# Patient Record
Sex: MALE | Race: WHITE | Employment: OTHER | URBAN - METROPOLITAN AREA
[De-identification: names, ages, dates, MRNs, and addresses within clinical notes are randomized per-mention and may not be internally consistent; named-entity substitution may affect disease eponyms.]

---

## 2017-01-27 ENCOUNTER — TELEPHONE (OUTPATIENT)
Dept: INTERNAL MEDICINE CLINIC | Age: 77
End: 2017-01-27

## 2017-01-27 NOTE — TELEPHONE ENCOUNTER
----- Message from Otilia Aguayo sent at 1/27/2017 10:10 AM EST -----  Regarding: Port / Telephone  Carolina Queen [facility] would like a callback to know the status of a fax for a power wheelchair. The fax was sent 1/24/16. The best contact number is [phone].

## 2017-01-27 NOTE — TELEPHONE ENCOUNTER
Patient is calling to check the status of paperwork. The medical equipment is contacting him to contact us.

## 2017-01-27 NOTE — TELEPHONE ENCOUNTER
Yasmin Arthur with [facility] request a return call regarding the status of Rehab BPD paperwork.  Yasmin Arthur contact [phone]

## 2017-02-01 DIAGNOSIS — I25.10 CAD IN NATIVE ARTERY: ICD-10-CM

## 2017-07-24 ENCOUNTER — HOSPITAL ENCOUNTER (OUTPATIENT)
Dept: LAB | Age: 77
Discharge: HOME | End: 2017-07-24
Payer: MEDICARE

## 2017-07-24 ENCOUNTER — OFFICE VISIT (OUTPATIENT)
Dept: INTERNAL MEDICINE CLINIC | Age: 77
End: 2017-07-24

## 2017-07-24 VITALS
HEIGHT: 73 IN | WEIGHT: 218 LBS | RESPIRATION RATE: 16 BRPM | DIASTOLIC BLOOD PRESSURE: 81 MMHG | OXYGEN SATURATION: 95 % | SYSTOLIC BLOOD PRESSURE: 133 MMHG | HEART RATE: 79 BPM | BODY MASS INDEX: 28.89 KG/M2 | TEMPERATURE: 97.7 F

## 2017-07-24 DIAGNOSIS — R53.82 CHRONIC FATIGUE: ICD-10-CM

## 2017-07-24 DIAGNOSIS — Z13.21 ENCOUNTER FOR VITAMIN DEFICIENCY SCREENING: ICD-10-CM

## 2017-07-24 DIAGNOSIS — N40.0 BENIGN NON-NODULAR PROSTATIC HYPERPLASIA WITHOUT LOWER URINARY TRACT SYMPTOMS: ICD-10-CM

## 2017-07-24 DIAGNOSIS — G14 POST-POLIO SYNDROME: ICD-10-CM

## 2017-07-24 DIAGNOSIS — Z00.00 ROUTINE GENERAL MEDICAL EXAMINATION AT A HEALTH CARE FACILITY: ICD-10-CM

## 2017-07-24 DIAGNOSIS — I25.10 CAD IN NATIVE ARTERY: ICD-10-CM

## 2017-07-24 DIAGNOSIS — Z13.31 SCREENING FOR DEPRESSION: ICD-10-CM

## 2017-07-24 DIAGNOSIS — Z00.00 MEDICARE ANNUAL WELLNESS VISIT, SUBSEQUENT: ICD-10-CM

## 2017-07-24 DIAGNOSIS — Z71.89 ADVANCED CARE PLANNING/COUNSELING DISCUSSION: ICD-10-CM

## 2017-07-24 DIAGNOSIS — Z13.39 SCREENING FOR ALCOHOLISM: ICD-10-CM

## 2017-07-24 DIAGNOSIS — I10 BENIGN ESSENTIAL HTN: ICD-10-CM

## 2017-07-24 DIAGNOSIS — Z00.00 MEDICARE ANNUAL WELLNESS VISIT, INITIAL: Primary | ICD-10-CM

## 2017-07-24 DIAGNOSIS — E78.00 HYPERCHOLESTEROLEMIA: ICD-10-CM

## 2017-07-24 PROCEDURE — 82306 VITAMIN D 25 HYDROXY: CPT

## 2017-07-24 PROCEDURE — 84153 ASSAY OF PSA TOTAL: CPT

## 2017-07-24 PROCEDURE — 82728 ASSAY OF FERRITIN: CPT

## 2017-07-24 PROCEDURE — 83550 IRON BINDING TEST: CPT

## 2017-07-24 PROCEDURE — 36415 COLL VENOUS BLD VENIPUNCTURE: CPT

## 2017-07-24 PROCEDURE — 80053 COMPREHEN METABOLIC PANEL: CPT

## 2017-07-24 PROCEDURE — 80061 LIPID PANEL: CPT

## 2017-07-24 PROCEDURE — 85025 COMPLETE CBC W/AUTO DIFF WBC: CPT

## 2017-07-24 PROCEDURE — 84403 ASSAY OF TOTAL TESTOSTERONE: CPT

## 2017-07-24 PROCEDURE — 82607 VITAMIN B-12: CPT

## 2017-07-24 PROCEDURE — 84443 ASSAY THYROID STIM HORMONE: CPT

## 2017-07-24 RX ORDER — PNEUMOCOCCAL 13-VALENT CONJUGATE VACCINE 2.2; 2.2; 2.2; 2.2; 2.2; 4.4; 2.2; 2.2; 2.2; 2.2; 2.2; 2.2; 2.2 UG/.5ML; UG/.5ML; UG/.5ML; UG/.5ML; UG/.5ML; UG/.5ML; UG/.5ML; UG/.5ML; UG/.5ML; UG/.5ML; UG/.5ML; UG/.5ML; UG/.5ML
0.5 INJECTION, SUSPENSION INTRAMUSCULAR ONCE
Qty: 1 SYRINGE | Refills: 0 | Status: SHIPPED | OUTPATIENT
Start: 2017-07-24 | End: 2017-07-24

## 2017-07-24 RX ORDER — TAMSULOSIN HYDROCHLORIDE 0.4 MG/1
0.4 CAPSULE ORAL DAILY
Qty: 90 CAP | Refills: 1 | Status: SHIPPED | OUTPATIENT
Start: 2017-07-24 | End: 2019-04-04

## 2017-07-24 RX ORDER — METOPROLOL SUCCINATE 25 MG/1
TABLET, EXTENDED RELEASE ORAL
Qty: 90 TAB | Refills: 1 | Status: SHIPPED | OUTPATIENT
Start: 2017-07-24 | End: 2018-04-26 | Stop reason: SDUPTHER

## 2017-07-24 RX ORDER — VALSARTAN 80 MG/1
80 TABLET ORAL DAILY
Qty: 90 TAB | Refills: 1 | Status: SHIPPED | OUTPATIENT
Start: 2017-07-24 | End: 2018-08-20 | Stop reason: ALTCHOICE

## 2017-07-24 RX ORDER — SIMVASTATIN 40 MG/1
40 TABLET, FILM COATED ORAL
Qty: 90 TAB | Refills: 1 | Status: SHIPPED | OUTPATIENT
Start: 2017-07-24 | End: 2018-10-02 | Stop reason: SDUPTHER

## 2017-07-24 RX ORDER — DUTASTERIDE 0.5 MG/1
0.5 CAPSULE, LIQUID FILLED ORAL DAILY
Qty: 90 CAP | Refills: 1 | Status: SHIPPED | OUTPATIENT
Start: 2017-07-24 | End: 2018-04-17 | Stop reason: ALTCHOICE

## 2017-07-24 NOTE — Clinical Note
Dr. Amy Hernández note completed; apologies for the delay. Upgraded the  to a  as per patient's Medicare benefits. Thank you.  Arleth Norman

## 2017-07-24 NOTE — MR AVS SNAPSHOT
Visit Information Date & Time Provider Department Dept. Phone Encounter #  
 7/24/2017 10:45 AM Kenya Cutler MD [facility] [address] [id number] Upcoming Health Maintenance Date Due ZOSTER VACCINE AGE 60> 5/21/1998 GLAUCOMA SCREENING Q2Y 7/21/2003 Pneumococcal 65+ High/Highest Risk (2 of 2 - PCV13) 3/24/2017 INFLUENZA AGE 9 TO ADULT 8/1/2017 MEDICARE YEARLY EXAM 7/25/2018 DTaP/Tdap/Td series (2 - Td) 7/24/2027 Allergies as of 7/24/2017  Review Complete On: 7/24/2017 By: Dipika Gallegos No Known Allergies Current Immunizations  Reviewed on 3/24/2016 Name Date Pneumococcal Polysaccharide (PPSV-23) 3/24/2016 Not reviewed this visit You Were Diagnosed With   
  
 Codes Comments Benign essential HTN    -  Primary ICD-10-CM: I10 
ICD-9-CM: 401.1   
 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 
ICD-9-CM: 221 Chronic fatigue     ICD-10-CM: R53.82 
ICD-9-CM: 780.79 Medicare annual wellness visit, subsequent     ICD-10-CM: Z00.00 ICD-9-CM: V70.0 Encounter for vitamin deficiency screening     ICD-10-CM: Z13.21 ICD-9-CM: V77.99 Vitals BP Pulse Temp Resp Height(growth percentile) Weight(growth percentile) 133/81 (BP 1 Location: Right arm, BP Patient Position: Sitting) 79 97.7 °F (36.5 °C) (Oral) 16 6' 1\" (1.854 m) 218 lb (98.9 kg) SpO2 BMI Smoking Status 95% 28.76 kg/m2 Never Smoker Vitals History BMI and BSA Data Body Mass Index Body Surface Area 28.76 kg/m 2 2.26 m 2 Preferred Pharmacy Pharmacy Name Phone [id number] [address], [address] [phone] Your Updated Medication List  
  
 This list is accurate as of: 17 11:43 AM.  Always use your most recent med list.  
  
  
  
  
 acetaminophen-codeine 300-30 mg per tablet Commonly known as:  TYLENOL #3 Take 1 Tab by mouth every six (6) hours as needed for Pain. Max Daily Amount: 4 Tabs. AMBIEN 10 mg tablet Generic drug:  zolpidem Take  by mouth nightly as needed for Sleep. dutasteride 0.5 mg capsule Commonly known as:  AVODART Take 1 Cap by mouth daily. metoprolol succinate 25 mg XL tablet Commonly known as:  TOPROL-XL  
TAKE 1 TABLET BY MOUTH EVERY DAY  
  
 MULTI VITAMIN PO Take  by mouth. PREVNAR 13 (PF) 0.5 mL Syrg injection Generic drug:  pneumococcal 13 blanche conj dip  
0.5 mL by IntraMUSCular route once for 1 dose. PLEASE HAVE THIS AT YOUR LOCAL PHARMACY  Indications: PREVENTION OF STREPTOCOCCUS PNEUMONIAE INFECTION  
  
 simvastatin 40 mg tablet Commonly known as:  ZOCOR Take 1 Tab by mouth nightly. tamsulosin 0.4 mg capsule Commonly known as:  FLOMAX Take 1 Cap by mouth daily. valsartan 80 mg tablet Commonly known as:  DIOVAN Take 1 Tab by mouth daily. varicella zoster vacine live 19,400 unit/0.65 mL Susr injection Commonly known as:  varicella-zoster vacine live 1 Vial by SubCUTAneous route once for 1 dose. VITAMIN B-12 1,000 mcg tablet Generic drug:  cyanocobalamin Take 1 Tab by mouth daily. VITAMIN C PO Take  by mouth. Prescriptions Printed Refills PREVNAR 13, PF, 0.5 mL syrg injection 0 Si.5 mL by IntraMUSCular route once for 1 dose. PLEASE HAVE THIS AT YOUR LOCAL PHARMACY  Indications: PREVENTION OF STREPTOCOCCUS PNEUMONIAE INFECTION Class: Print Route: IntraMUSCular  
 varicella zoster vacine live (VARICELLA-ZOSTER VACINE LIVE) 19,400 unit/0.65 mL susr injection 0 Si Vial by SubCUTAneous route once for 1 dose. Class: Print Route: SubCUTAneous Prescriptions Sent to Pharmacy Refills  
 metoprolol succinate (TOPROL-XL) 25 mg XL tablet 1 Sig: TAKE 1 TABLET BY MOUTH EVERY DAY Class: Normal  
 Pharmacy: [address] Ph #: [phone]  
 valsartan (DIOVAN) 80 mg tablet 1 Sig: Take 1 Tab by mouth daily. Class: Normal  
 Pharmacy: [address] [place] [address] Ph #: [phone] Route: Oral  
 tamsulosin (FLOMAX) 0.4 mg capsule 1 Sig: Take 1 Cap by mouth daily. Class: Normal  
 Pharmacy: [address] [place] [address] Ph #: [phone] Route: Oral  
 simvastatin (ZOCOR) 40 mg tablet 1 Sig: Take 1 Tab by mouth nightly. Class: Normal  
 Pharmacy: [address] [place] [address] Ph #: [phone] Route: Oral  
 dutasteride (AVODART) 0.5 mg capsule 1 Sig: Take 1 Cap by mouth daily. Class: Normal  
 Pharmacy: [address] [place] [address] Ph #: [phone] Route: Oral  
  
We Performed the Following CBC WITH AUTOMATED DIFF [10258 CPT(R)] FERRITIN [84354 CPT(R)] IRON PROFILE [id number] CPT(R)] LIPID PANEL [43999 CPT(R)] METABOLIC PANEL, COMPREHENSIVE [29702 CPT(R)] PSA W/ REFLX FREE PSA [27411 CPT(R)] TESTOSTERONE, FREE & TOTAL [47118 CPT(R)] TSH 3RD GENERATION [23522 CPT(R)] VITAMIN B12 & FOLATE [78489 CPT(R)] VITAMIN D, 25 HYDROXY [id number] CPT(R)] Introducing [facility]! Dane Luis introduces [employer] patient portal. Now you can access parts of your medical record, email your doctor's office, and request medication refills online. 1. In your internet browser, go to [email or web address] 2. Click on the First Time User? Click Here link in the Sign In box. You will see the New Member Sign Up page. 3. Enter your [employer] Access Code exactly as it appears below. You will not need to use this code after youve completed the sign-up process.  If you do not sign up before the expiration date, you must request a new code. · [employer] Access Code: [email or web address] Expires: 10/22/2017 11:41 AM 
 
4. Enter the last four digits of your Social Security Number (xxxx) and Date of Birth (mm/dd/yyyy) as indicated and click Submit. You will be taken to the next sign-up page. 5. Create a [employer] ID. This will be your [employer] login ID and cannot be changed, so think of one that is secure and easy to remember. 6. Create a [employer] password. You can change your password at any time. 7. Enter your Password Reset Question and Answer. This can be used at a later time if you forget your password. 8. Enter your e-mail address. You will receive e-mail notification when new information is available in [address] 9. Click Sign Up. You can now view and download portions of your medical record. 10. Click the Download Summary menu link to download a portable copy of your medical information. If you have questions, please visit the Frequently Asked Questions section of the [employer] website. Remember, [employer] is NOT to be used for urgent needs. For medical emergencies, dial 911. Now available from your iPhone and Android! Please provide this summary of care documentation to your next provider. Your primary care clinician is listed as Pallavi Johnson. If you have any questions after today's visit, please call [phone].

## 2017-07-25 PROBLEM — Z71.89 ADVANCED CARE PLANNING/COUNSELING DISCUSSION: Status: ACTIVE | Noted: 2017-07-25

## 2017-07-25 NOTE — PROGRESS NOTES
Nurse Navigator Medicare Wellness Visit performed by JAYLA Winters    This is an Initial Aubree Exam (AWV) (Performed 12 months after IPPE or effective date of Medicare Part B enrollment, Once in a lifetime)    I have reviewed the patient's medical history in detail and updated the computerized patient record. 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 saw Dr. Elma Tang in [place]    Diverticulitis 2013    H/O cardiovascular stress test fall 2015    Hypercholesterolemia     Insomnia     Melanoma ([facility] [address]     x4. 2013. 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 He still drives. weakness, R deltoid weakness, s/p L arm amputation, R hip pain, R leg-ankle brace    Renal cyst     8 cm, increasing 20 years    Right hip pain     chronic, polio    Venous insufficiency     s/p venous stripping    Weakness of right leg     Zoster 1982      Past Surgical History:   Procedure Laterality Date    HX AMPUTATION Left 1953    arm, from polio    HX APPENDECTOMY      HX COLONOSCOPY  2008?  HX CORONARY STENT PLACEMENT  2006    HX CORONARY STENT PLACEMENT  2004?  HX HERNIA REPAIR Left     HX TONSILLECTOMY      HX VEIN STRIPPING       Current Outpatient Prescriptions   Medication Sig Dispense Refill    metoprolol succinate (TOPROL-XL) 25 mg XL tablet TAKE 1 TABLET BY MOUTH EVERY DAY 90 Tab 1    valsartan (DIOVAN) 80 mg tablet Take 1 Tab by mouth daily. 90 Tab 1    tamsulosin (FLOMAX) 0.4 mg capsule Take 1 Cap by mouth daily. 90 Cap 1    simvastatin (ZOCOR) 40 mg tablet Take 1 Tab by mouth nightly. 90 Tab 1    dutasteride (AVODART) 0.5 mg capsule Take 1 Cap by mouth daily.  90 Cap 1    zolpidem (AMBIEN) 10 mg tablet Take  by mouth nightly as needed for Sleep.  acetaminophen-codeine (TYLENOL #3) 300-30 mg per tablet Take 1 Tab by mouth every six (6) hours as needed for Pain. Max Daily Amount: 4 Tabs. 90 Tab 0    VITAMIN B-12 1,000 mcg tablet Take 1 Tab by mouth daily. 30 Tab 11    MULTIVIT &MINERALS/FERROUS FUM (MULTI VITAMIN PO) Take  by mouth.  ASCORBATE CALCIUM (VITAMIN C PO) Take  by mouth. 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 Patient states that they have a strong support system within their family & friends. PHQ over the last two weeks 7/25/2017   PHQ Not Done -   Little interest or pleasure in doing things Not at all   Feeling down, depressed or hopeless Not at all   Total Score PHQ 2 0     Alcohol Risk Factor Screening: On any occasion during the past 3 months, have you had more than 4 drinks containing alcohol? No    Do you average more than 14 drinks per week? No      Functional Ability and Level of Safety:     Hearing Loss   normal-to-mild    Activities of Daily Living   Self-care.  Patient states that he lives with his wife in an independent apartment located within the [facility]. Patient has a left arm amputation & ambulates with the assistance of a cane/ walking stick due to post polio syndrome. Patient shares that he has been learning to live with his condition for many, many years & he is proud to say that he remains independent in his ADL's; however, with post polio syndrome, his muscles are continually weakening, so for long distances, he utilizes an electric wheelchair. Patient states that he is beginning to rely on the electric wheelchair more frequently. Patient reports that he recently graduated from occupational therapy at [facility]; patient adds that his daughter is an occupational therapist & she keeps a good eye on him. Patient attempts to remain active, but only for short periods at a time as he can become fatigued. Patient is in good spirits today. Requires assistance with:   ADL Assessment 7/25/2017   Feeding yourself No Help Needed   Getting from bed to chair No Help Needed   Getting dressed No Help Needed   Bathing or showering No Help Needed   Walk across the room (includes cane/walker) No Help Needed   Using the telphone No Help Needed   Taking your medications No Help Needed   Preparing meals No Help Needed   Managing money (expenses/bills) No Help Needed   Moderately strenuous housework (laundry) No Help Needed   Shopping for personal items (toiletries/medicines) No Help Needed   Shopping for groceries No Help Needed   Driving No Help Needed   Climbing a flight of stairs No Help Needed   Getting to places beyond walking distances No Help Needed       Fall Risk   Fall Risk Assessment, last 12 mths 7/24/2017   Able to walk? Yes   Fall in past 12 months? No   Fall with injury? -   Number of falls in past 12 months -   Fall Risk Score -   Patient denies falls within the past year & verbalizes awareness of fall prevention strategies.    Abuse Screen   Patient is not abused     Abuse Screening Questionnaire 7/25/2017   Do you ever feel afraid of your partner? N   Are you in a relationship with someone who physically or mentally threatens you? N   Is it safe for you to go home? Y       Review of Systems   Medicare Wellness Visit    Physical Examination     No exam data present    Evaluation of Cognitive Function:  Mood/affect:  happy  Appearance: age appropriate and casually dressed  Family member/caregiver input: None present; however, patient reports a strong support system. No exam performed today, Medicare Wellness Visit. Patient Care Team:  Kehinde Ramirez MD as PCP - General (Internal Medicine)    Advice/Referrals/Counseling   Education and counseling provided:  End-of-Life planning (with patient's consent)  Pneumococcal Vaccine  Influenza Vaccine  Prostate cancer screening tests (PSA, covered annually)  Colorectal cancer screening tests  Screening for glaucoma  tdap & shingles vaccinations      Assessment/Plan   1. Patient states that a completed Advanced Medical Directive is at home. NN encouraged patient to bring a copy of the completed Advanced Medical Directive to the office for scanning into the medical record. Patient verbalized understanding & agreement. 2. Patient is up to date on the following immunizations:  Immunization History   Administered Date(s) Administered    Influenza Vaccine 10/15/2016    Pneumococcal Polysaccharide (PPSV-23) 03/24/2016   Patient confirmed the aforementioned preventative immunization dates are correct. Patient reports a case of shingles in the past; therefore, he denies having yet received a shingles vaccine. Patient is unable to recall the date of their last tdap vaccine. NN encouraged patient to check home records & if information obtained, to please notify PCP's office with the details. Patient verbalized agreement. Patient is due for Prevnar 13. Patient is aware that Prevnar 13 can be given at their local pharmacy with a prescription from their PCP.  Patient verbalized understanding. PCP notified. Today, PCP provided patient with a Prevnar 13 vaccination prescription. 3. Patient states that he follows his PCP's recommendations for PSA screenings (report on file from 7/2014) & Colonoscopy screenings. Patient reports that his last screening colonoscopy was completed within the last 10 years in [place]. Today, PCP provided patient with an order for a screening PSA lab test.    4. Patient wears corrective lenses. Patient reports having a routine eye exam & glaucoma screening within the last year performed by Dr. José Antonio Sherwood at the [facility]. HARINI faxed requesting a copy of patient's last eye exam with glaucoma screening with patient's verbal approval. Patient reports an upcoming eye exam appointment with Dr. José Antonio Sherwood next month, August 2017). Patient verbalized understanding of all information discussed. Patient was given the opportunity to ask questions. Medication reconciliation completed by MA/ LPN and reviewed by PCP. Patient provided AVS, either a printed version or electronic version in [employer], which includes Medicare Wellness Preventative Screening Table.

## 2017-07-25 NOTE — PROGRESS NOTES
HISTORY OF PRESENT ILLNESS    Chief Complaint   Patient presents with    Cholesterol Problem       Presents for follow-up    Chronic pain and weakness from post-polio syndrome. Stable. He is taking tylenol #3 prn    Hypertension  Hypertension ROS: taking medications as instructed, no medication side effects noted, no TIA's, no chest pain on exertion, no dyspnea on exertion, no swelling of ankles     reports that he has never smoked. He has never used smokeless tobacco.    reports that he does not drink alcohol. BP Readings from Last 2 Encounters:   07/24/17 133/81   11/03/16 150/80     Hyperlipidemia  Currently he takes simvastatin 3o mg  ROS: taking medications as instructed, no medication side effects noted  No new myalgias, no joint pains, no weakness  No TIA's, no chest pain on exertion, no dyspnea on exertion, no swelling of ankles. Lab Results   Component Value Date/Time    Cholesterol, total 153 04/29/2016 08:49 AM    HDL Cholesterol 48 04/29/2016 08:49 AM    LDL, calculated 89 04/29/2016 08:49 AM    VLDL, calculated 16 04/29/2016 08:49 AM    Triglyceride 80 04/29/2016 08:49 AM             Review of Systems   All other systems reviewed and are negative, except as noted in HPI    Past Medical and Surgical History   has a past medical history of Amputation of arm ([address] (1953); Benign essential HTN; BPH (benign prostatic hyperplasia); CAD in native artery; Diverticulitis (2013); H/O cardiovascular stress test (fall 2015); Hypercholesterolemia; Insomnia; Melanoma ([address] Phantom pain; Post-polio syndrome (age 4); Renal cyst; Right hip pain; Venous insufficiency; Weakness of right leg; and Zoster (1982). has a past surgical history that includes appendectomy; hernia repair (Left); tonsillectomy; vein stripping; amputation (Left, 1953); coronary stent placement (2006); coronary stent placement (2004?); and colonoscopy (2008?). reports that he has never smoked.  He has never used smokeless tobacco. He reports that he does not drink alcohol.  family history includes Diabetes in his mother. Physical Exam   Nursing note and vitals reviewed. Blood pressure 133/81, pulse 79, temperature 97.7 °F (36.5 °C), temperature source Oral, resp. rate 16, height 6' 1\" (1.854 m), weight 218 lb (98.9 kg), SpO2 95 %. Constitutional:  No distress. Eyes: Conjunctivae are normal.   Ears:  Hearing grossly intact  Cardiovascular: Normal rate. regular rhythm, no murmurs or gallops  No edema  Pulmonary/Chest: Effort normal.   CTAB  Musculoskeletal: moves all 4 extremities   Neurological: Alert and oriented to person, place, and time. Skin: No rash noted. Psychiatric: Normal mood and affect. Behavior is normal.     ASSESSMENT and PLAN  Hermelinda Weaver was seen today for cholesterol problem. Diagnoses and all orders for this visit:    Medicare annual wellness visit, subsequent  Physician Addendum  I personally saw and examined the patient. I have discussed and reviewed note with Nurse Navigator and agree with the Nurse Navigator's findings and recommendations for this Wellness Exam.  I was present during the key portions of separately billed procedures. Orders written and signed by me as per chart. Emani Velásquez MD      -     PREVNAR 13, PF, 0.5 mL syrg injection; 0.5 mL by IntraMUSCular route once for 1 dose. PLEASE HAVE THIS AT YOUR LOCAL PHARMACY  Indications: PREVENTION OF STREPTOCOCCUS PNEUMONIAE INFECTION    Benign essential HTN - Controlled on current regimen. Continue current medications as written in chart  -     valsartan (DIOVAN) 80 mg tablet; Take 1 Tab by mouth daily.  -     CBC WITH AUTOMATED DIFF  -     METABOLIC PANEL, COMPREHENSIVE  -     TSH 3RD GENERATION    CAD in native artery  -     metoprolol succinate (TOPROL-XL) 25 mg XL tablet; TAKE 1 TABLET BY MOUTH EVERY DAY    Hypercholesterolemia  -     simvastatin (ZOCOR) 40 mg tablet; Take 1 Tab by mouth nightly.   -     LIPID PANEL    Benign non-nodular prostatic hyperplasia without lower urinary tract symptoms  -     dutasteride (AVODART) 0.5 mg capsule; Take 1 Cap by mouth daily.  -     PSA W/ REFLX FREE PSA    Post-polio syndrome    Chronic fatigue - no recent labs  -     TSH 3RD GENERATION  -     IRON PROFILE  -     TESTOSTERONE, FREE & TOTAL    Encounter for vitamin deficiency screening  -     VITAMIN B12 & FOLATE  -     VITAMIN D, 25 HYDROXY  -     FERRITIN  -     IRON PROFILE    Other orders  -     tamsulosin (FLOMAX) 0.4 mg capsule; Take 1 Cap by mouth daily. -     varicella zoster vacine live (VARICELLA-ZOSTER VACINE LIVE) 19,400 unit/0.65 mL susr injection; 1 Vial by SubCUTAneous route once for 1 dose. There are no Patient Instructions on file for this visit.    lab results and schedule of future lab studies reviewed with patient  reviewed medications and side effects in detail    Return to clinic for further evaluation if new symptoms develop    Follow-up Disposition: Not on File    Current Outpatient Prescriptions   Medication Sig    metoprolol succinate (TOPROL-XL) 25 mg XL tablet TAKE 1 TABLET BY MOUTH EVERY DAY    valsartan (DIOVAN) 80 mg tablet Take 1 Tab by mouth daily.  tamsulosin (FLOMAX) 0.4 mg capsule Take 1 Cap by mouth daily.  simvastatin (ZOCOR) 40 mg tablet Take 1 Tab by mouth nightly.  dutasteride (AVODART) 0.5 mg capsule Take 1 Cap by mouth daily.  PREVNAR 13, PF, 0.5 mL syrg injection 0.5 mL by IntraMUSCular route once for 1 dose. PLEASE HAVE THIS AT YOUR LOCAL PHARMACY  Indications: PREVENTION OF STREPTOCOCCUS PNEUMONIAE INFECTION    varicella zoster vacine live (VARICELLA-ZOSTER VACINE LIVE) 19,400 unit/0.65 mL susr injection 1 Vial by SubCUTAneous route once for 1 dose.  zolpidem (AMBIEN) 10 mg tablet Take  by mouth nightly as needed for Sleep.  acetaminophen-codeine (TYLENOL #3) 300-30 mg per tablet Take 1 Tab by mouth every six (6) hours as needed for Pain. Max Daily Amount: 4 Tabs.     VITAMIN B-12 1,000 mcg tablet Take 1 Tab by mouth daily.  MULTIVIT &MINERALS/FERROUS FUM (MULTI VITAMIN PO) Take  by mouth.  ASCORBATE CALCIUM (VITAMIN C PO) Take  by mouth. No current facility-administered medications for this visit.

## 2017-07-25 NOTE — PATIENT INSTRUCTIONS
Medicare Part B Preventive Services Guidelines/Limitations Date last completed and Frequency Due Date   Cardiovascular Screening Blood Tests (every 5 years)  Total cholesterol, HDL, Triglycerides Order as a panel if possible Completed 3/2016    As recommended by your PCP As recommended by your PCP or Specialist   Colorectal Cancer Screening  -Fecal occult blood test (annual)  -Flexible sigmoidoscopy (5y)  -Screening colonoscopy (10y)  -Barium Enema Age 49-80; After age [de-identified] if history of abnormal results Completed within the last 10 years in [place]     Recommended every 5 to 10 years  As recommended by your PCP or Specialist     Counseling to Prevent Tobacco Use (up to 8 sessions per year)  - Counseling greater than 3 and up to 10 minutes  - Counseling greater than 10 minutes Patients must be asymptomatic of tobacco-related conditions to receive as preventive service N/A N/A   Diabetes Screening Tests (at least every 3 years, Medicare covers annually or at 6-month intervals for prediabetic patients)    Fasting blood sugar (FBS) or glucose tolerance test (GTT) Patient must be diagnosed with one of the following:  -Hypertension, Dyslipidemia, obesity, previous impaired FBS or GTT  Or any two of the following: overweight, FH of diabetes, age ? 72, history of gestational diabetes, birth of baby weighing more than 9 pounds Completed 3/2016    Recommended every 3 years for non-diabetics     As recommended by your PCP or Specialist     Glaucoma Screening (no USPSTF recommendation) Diabetes mellitus, family history, , age 48 or over,  American, age 72 or over Completed within the last year    Recommended annually Patient reports an upcoming appt next month (august 2017)   Prostate Cancer Screening (annually up to age 76)  - Digital rectal exam (YANETH)  - Prostate specific antigen (PSA) Annually (age 48 or over), YANETH not paid separately when covered E/M service is provided on same date Completed 7/2014    Recommended annually to age 76 As recommended by your PCP or Specialist     Seasonal Influenza Vaccination (annually)  Completed Fall 2016  Recommended Annually Due Fall 2017   TDAP Vaccination  As recommended by your PCP or Specialist    Recommended every 10 years As recommended by your PCP or Specialist   Zoster (Shingles) Vaccination Covered by Medicare Part D through the pharmacy- PCP provides prescription Never received    Recommended once over age 48  Patient reports a case of shingles in the past   Pneumococcal Vaccination (once after 72)  Pneumo 23- 3/2016  Recommended once over the age of 72    Prevnar 15-  Recommended once over the age of 72 Complete        You are due for a Prevnar 13 vaccine. You can get this at your local pharmacy with a prescription from your PCP. Ultrasound Screening for Abdominal Aortic Aneurysm (AAA) (once) Patient must be referred through IPPE and not have had a screening for abdominal aortic aneurysm before under Medicare. Limited to patients who meet one of the following criteria:  - Men who are 73-68 years old and have smoked more than 100 cigarettes in their lifetime.  -Anyone with a FH of AAA  -Anyone recommended for screening by USPSTF Not indicated unless recommended by PCP   Not indicated unless recommended by PCP     Family Practice Management 2011    Please bring a copy of your completed advance medical directive to the office so it may be added to your medical record. Thank you. If you have any questions or concerns please feel free to contact me at [phone]. It was a pleasure meeting you today and participating in your healthcare.   Doug Ventura RN

## 2017-07-26 DIAGNOSIS — G54.6 PHANTOM PAIN (HCC): ICD-10-CM

## 2017-07-26 LAB
25(OH)D3+25(OH)D2 SERPL-MCNC: 36.5 NG/ML (ref 30–100)
ALBUMIN SERPL-MCNC: 4.4 G/DL (ref 3.5–4.8)
ALBUMIN/GLOB SERPL: 1.8 {RATIO} (ref 1.2–2.2)
ALP SERPL-CCNC: 75 IU/L (ref 39–117)
ALT SERPL-CCNC: 18 IU/L (ref 0–44)
AST SERPL-CCNC: 20 IU/L (ref 0–40)
BASOPHILS # BLD AUTO: 0 X10E3/UL (ref 0–0.2)
BASOPHILS NFR BLD AUTO: 1 %
BILIRUB SERPL-MCNC: 0.5 MG/DL (ref 0–1.2)
BUN SERPL-MCNC: 16 MG/DL (ref 8–27)
BUN/CREAT SERPL: 16 (ref 10–24)
CALCIUM SERPL-MCNC: 9.3 MG/DL (ref 8.6–10.2)
CHLORIDE SERPL-SCNC: 103 MMOL/L (ref 96–106)
CHOLEST SERPL-MCNC: 137 MG/DL (ref 100–199)
CO2 SERPL-SCNC: 23 MMOL/L (ref 18–29)
CREAT SERPL-MCNC: 1 MG/DL (ref 0.76–1.27)
EOSINOPHIL # BLD AUTO: 0.1 X10E3/UL (ref 0–0.4)
EOSINOPHIL NFR BLD AUTO: 1 %
ERYTHROCYTE [DISTWIDTH] IN BLOOD BY AUTOMATED COUNT: 14 % (ref 12.3–15.4)
FERRITIN SERPL-MCNC: 135 NG/ML (ref 30–400)
FOLATE SERPL-MCNC: >20 NG/ML
GLOBULIN SER CALC-MCNC: 2.5 G/DL (ref 1.5–4.5)
GLUCOSE SERPL-MCNC: 70 MG/DL (ref 65–99)
HCT VFR BLD AUTO: 48.8 % (ref 37.5–51)
HDLC SERPL-MCNC: 48 MG/DL
HGB BLD-MCNC: 16 G/DL (ref 12.6–17.7)
IMM GRANULOCYTES # BLD: 0.1 X10E3/UL (ref 0–0.1)
IMM GRANULOCYTES NFR BLD: 1 %
INTERPRETATION, 910389: NORMAL
IRON SATN MFR SERPL: 26 % (ref 15–55)
IRON SERPL-MCNC: 74 UG/DL (ref 38–169)
LDLC SERPL CALC-MCNC: 69 MG/DL (ref 0–99)
LYMPHOCYTES # BLD AUTO: 1 X10E3/UL (ref 0.7–3.1)
LYMPHOCYTES NFR BLD AUTO: 12 %
MCH RBC QN AUTO: 31.2 PG (ref 26.6–33)
MCHC RBC AUTO-ENTMCNC: 32.8 G/DL (ref 31.5–35.7)
MCV RBC AUTO: 95 FL (ref 79–97)
MONOCYTES # BLD AUTO: 1 X10E3/UL (ref 0.1–0.9)
MONOCYTES NFR BLD AUTO: 12 %
NEUTROPHILS # BLD AUTO: 6.2 X10E3/UL (ref 1.4–7)
NEUTROPHILS NFR BLD AUTO: 73 %
PLATELET # BLD AUTO: 120 X10E3/UL (ref 150–379)
POTASSIUM SERPL-SCNC: 4.1 MMOL/L (ref 3.5–5.2)
PROT SERPL-MCNC: 6.9 G/DL (ref 6–8.5)
PSA SERPL-MCNC: 2.3 NG/ML (ref 0–4)
RBC # BLD AUTO: 5.13 X10E6/UL (ref 4.14–5.8)
REFLEX CRITERIA: NORMAL
SODIUM SERPL-SCNC: 142 MMOL/L (ref 134–144)
TESTOST FREE SERPL-MCNC: 7.8 PG/ML (ref 6.6–18.1)
TESTOST SERPL-MCNC: 457 NG/DL (ref 264–916)
TIBC SERPL-MCNC: 289 UG/DL (ref 250–450)
TRIGL SERPL-MCNC: 98 MG/DL (ref 0–149)
TSH SERPL DL<=0.005 MIU/L-ACNC: 1.37 UIU/ML (ref 0.45–4.5)
UIBC SERPL-MCNC: 215 UG/DL (ref 111–343)
VIT B12 SERPL-MCNC: >2000 PG/ML (ref 211–946)
VLDLC SERPL CALC-MCNC: 20 MG/DL (ref 5–40)
WBC # BLD AUTO: 8.4 X10E3/UL (ref 3.4–10.8)

## 2017-07-26 RX ORDER — ACETAMINOPHEN AND CODEINE PHOSPHATE 300; 30 MG/1; MG/1
TABLET ORAL
Qty: 90 TAB | Refills: 0 | OUTPATIENT
Start: 2017-07-26 | End: 2018-02-02 | Stop reason: SDUPTHER

## 2018-02-02 DIAGNOSIS — G54.6 PHANTOM PAIN (HCC): ICD-10-CM

## 2018-02-04 RX ORDER — ACETAMINOPHEN AND CODEINE PHOSPHATE 300; 30 MG/1; MG/1
1 TABLET ORAL
Qty: 30 TAB | Refills: 0 | Status: SHIPPED | OUTPATIENT
Start: 2018-02-04 | End: 2018-05-27 | Stop reason: SDUPTHER

## 2018-04-17 ENCOUNTER — OFFICE VISIT (OUTPATIENT)
Dept: INTERNAL MEDICINE CLINIC | Age: 78
End: 2018-04-17

## 2018-04-17 VITALS
SYSTOLIC BLOOD PRESSURE: 133 MMHG | WEIGHT: 218.8 LBS | OXYGEN SATURATION: 95 % | BODY MASS INDEX: 29 KG/M2 | HEIGHT: 73 IN | DIASTOLIC BLOOD PRESSURE: 79 MMHG | RESPIRATION RATE: 16 BRPM | HEART RATE: 86 BPM

## 2018-04-17 DIAGNOSIS — G54.6 PHANTOM PAIN (HCC): ICD-10-CM

## 2018-04-17 DIAGNOSIS — R05.9 COUGH: ICD-10-CM

## 2018-04-17 DIAGNOSIS — Z66 DNR (DO NOT RESUSCITATE): ICD-10-CM

## 2018-04-17 DIAGNOSIS — S48.919A AMPUTATION OF ARM (HCC): ICD-10-CM

## 2018-04-17 DIAGNOSIS — R33.8 BENIGN PROSTATIC HYPERPLASIA WITH URINARY RETENTION: ICD-10-CM

## 2018-04-17 DIAGNOSIS — I87.2 VENOUS INSUFFICIENCY: ICD-10-CM

## 2018-04-17 DIAGNOSIS — D69.6 THROMBOCYTOPENIA (HCC): ICD-10-CM

## 2018-04-17 DIAGNOSIS — E78.00 HYPERCHOLESTEROLEMIA: ICD-10-CM

## 2018-04-17 DIAGNOSIS — J30.1 ALLERGIC RHINITIS DUE TO POLLEN, UNSPECIFIED SEASONALITY: ICD-10-CM

## 2018-04-17 DIAGNOSIS — I10 BENIGN ESSENTIAL HTN: ICD-10-CM

## 2018-04-17 DIAGNOSIS — M25.471 EDEMA OF RIGHT ANKLE: Primary | ICD-10-CM

## 2018-04-17 DIAGNOSIS — C43.60 MALIGNANT MELANOMA OF UPPER EXTREMITY, INCLUDING SHOULDER, UNSPECIFIED LATERALITY (HCC): ICD-10-CM

## 2018-04-17 DIAGNOSIS — N52.9 ERECTILE DYSFUNCTION, UNSPECIFIED ERECTILE DYSFUNCTION TYPE: ICD-10-CM

## 2018-04-17 DIAGNOSIS — N40.1 BENIGN PROSTATIC HYPERPLASIA WITH URINARY RETENTION: ICD-10-CM

## 2018-04-17 RX ORDER — TADALAFIL 5 MG/1
5 TABLET ORAL DAILY
Qty: 90 TAB | Refills: 1 | Status: SHIPPED | OUTPATIENT
Start: 2018-04-17 | End: 2018-04-27 | Stop reason: ALTCHOICE

## 2018-04-17 NOTE — ASSESSMENT & PLAN NOTE
Stable, based on history, physical exam and review of pertinent labs, studies and medications; meds reconciled; continue current treatment plan.   Lab Results   Component Value Date/Time    WBC 8.4 07/24/2017 11:58 AM    HGB 16.0 07/24/2017 11:58 AM    HCT 48.8 07/24/2017 11:58 AM    PLATELET 795 27/58/9181 11:58 AM    Creatinine 1.00 07/24/2017 11:58 AM    BUN 16 07/24/2017 11:58 AM    Potassium 4.1 07/24/2017 11:58 AM

## 2018-04-17 NOTE — MR AVS SNAPSHOT
[facility] 
 
 
 [address] 
[phone] Patient: Francy Merino MRN: [id number] PRA:0/31/3504 Visit Information Date & Time Provider Department Dept. Phone Encounter #  
 4/17/2018  9:30 AM Lien Puckett MD [facility] [address] [id number] Upcoming Health Maintenance Date Due ZOSTER VACCINE AGE 60> 5/21/1998 GLAUCOMA SCREENING Q2Y 7/21/2003 Pneumococcal 65+ High/Highest Risk (2 of 2 - PCV13) 3/24/2017 Influenza Age 5 to Adult 8/1/2017 MEDICARE YEARLY EXAM 7/25/2018 DTaP/Tdap/Td series (2 - Td) 7/24/2027 Allergies as of 4/17/2018  Review Complete On: 4/17/2018 By: Lien Puckett MD  
 No Known Allergies Current Immunizations  Reviewed on 3/24/2016 Name Date Influenza Vaccine 10/15/2016 Pneumococcal Polysaccharide (PPSV-23) 3/24/2016 Not reviewed this visit You Were Diagnosed With   
  
 Codes Comments Edema of right ankle    -  Primary ICD-10-CM: M25.471 ICD-9-CM: 719.07 Venous insufficiency     ICD-10-CM: I87.2 ICD-9-CM: 459.81 Cough     ICD-10-CM: R05 ICD-9-CM: 786.2 Allergic rhinitis due to pollen, unspecified seasonality     ICD-10-CM: J30.1 ICD-9-CM: 477.0 Benign essential HTN     ICD-10-CM: I10 
ICD-9-CM: 401.1 Hypercholesterolemia     ICD-10-CM: E78.00 ICD-9-CM: 272.0 DNR (do not resuscitate)     ICD-10-CM: G88 ICD-9-CM: V49.86 Phantom pain ([address]     ICD-10-CM: G54.6 ICD-9-CM: 353.6 Malignant melanoma of upper extremity, including shoulder, unspecified laterality ([address]     ICD-10-CM: C43.60 ICD-9-CM: 172.6 Amputation of arm ([address]     ICD-10-CM: X68.323P 
ICD-9-CM: 079. 4 Thrombocytopenia ([facility]     ICD-10-CM: D69.6 ICD-9-CM: 287.5 Benign prostatic hyperplasia with urinary retention     ICD-10-CM: N40.1, R33.8 ICD-9-CM: 600.01, 788.20 Erectile dysfunction, unspecified erectile dysfunction type     ICD-10-CM: N52.9 ICD-9-CM: 607.84 Vitals BP Pulse Resp Height(growth percentile) Weight(growth percentile) SpO2  
 133/79 (BP 1 Location: Right arm, BP Patient Position: Sitting) 86 16 6' 1\" (1.854 m) 218 lb 12.8 oz (99.2 kg) 95% BMI Smoking Status 28.87 kg/m2 Never Smoker Vitals History BMI and BSA Data Body Mass Index Body Surface Area  
 28.87 kg/m 2 2.26 m 2 Preferred Pharmacy Pharmacy Name Phone [id number] [address], [address] [phone] Your Updated Medication List  
  
   
This list is accurate as of 4/17/18 10:20 AM.  Always use your most recent med list.  
  
  
  
  
 acetaminophen-codeine 300-30 mg per tablet Commonly known as:  TYLENOL #3 Take 1 Tab by mouth every six (6) hours as needed for Pain. Max Daily Amount: 4 Tabs. Appointment due AMBIEN 10 mg tablet Generic drug:  zolpidem Take  by mouth nightly as needed for Sleep.  
  
 metoprolol succinate 25 mg XL tablet Commonly known as:  TOPROL-XL  
TAKE 1 TABLET BY MOUTH EVERY DAY  
  
 MULTI VITAMIN PO Take  by mouth. simvastatin 40 mg tablet Commonly known as:  ZOCOR Take 1 Tab by mouth nightly. tadalafil 5 mg tablet Commonly known as:  CIALIS Take 1 Tab by mouth daily. Failed tamsulosin  Indications: benign prostatic hyperplasia with lower urinary tract sx, Erectile Dysfunction  
  
 tamsulosin 0.4 mg capsule Commonly known as:  FLOMAX Take 1 Cap by mouth daily. valsartan 80 mg tablet Commonly known as:  DIOVAN Take 1 Tab by mouth daily. VITAMIN C PO Take  by mouth. Prescriptions Printed Refills  
 tadalafil (CIALIS) 5 mg tablet 1 Sig: Take 1 Tab by mouth daily. Failed tamsulosin  Indications: benign prostatic hyperplasia with lower urinary tract sx, Erectile Dysfunction Class: Print  Route: Oral  
  
 We Performed the Following DO NOT RESUSCITATE Michelle Oconnor To-Do List   
 Around 04/17/2018 Imaging:  XR CHEST PA LAT Introducing [facility] [place] SERVICES! Dear Chang Back: Thank you for requesting a Delectable account. Our records indicate that you already have an active Delectable account. You can access your account anytime at [email or web address] Did you know that you can access your hospital and ER discharge instructions at any time in Delectable? You can also review all of your test results from your hospital stay or ER visit. Additional Information If you have questions, please visit the Frequently Asked Questions section of the Delectable website at [email or web address] Remember, Delectable is NOT to be used for urgent needs. For medical emergencies, dial 911. Now available from your iPhone and Android! Please provide this summary of care documentation to your next provider. Your primary care clinician is listed as Radha Harrison. If you have any questions after today's visit, please call [phone].

## 2018-04-17 NOTE — ASSESSMENT & PLAN NOTE
Stable, based on history, physical exam and review of pertinent labs, studies and medications; meds reconciled; changes to treatment plan as per orders.   Lab Results   Component Value Date/Time    WBC 8.4 07/24/2017 11:58 AM    HGB 16.0 07/24/2017 11:58 AM    HCT 48.8 07/24/2017 11:58 AM    PLATELET 011 11/14/0854 11:58 AM    Creatinine 1.00 07/24/2017 11:58 AM    BUN 16 07/24/2017 11:58 AM    Potassium 4.1 07/24/2017 11:58 AM

## 2018-04-17 NOTE — PROGRESS NOTES
HISTORY OF PRESENT ILLNESS    Chief Complaint   Patient presents with    Foot Swelling     right foot is soar and swollen for about 10 days.  Cold Symptoms     chest congestion for several months       Presents for follow-up    Right ankle swelling and redness and pain for 10 days. Mild and improving. No injuries. Not wearing ankle brace. Hypertension  Hypertension ROS: taking medications as instructed, no medication side effects noted, no TIA's, no chest pain on exertion, no dyspnea on exertion, no swelling of ankles     reports that he has never smoked. He has never used smokeless tobacco.    reports that he does not drink alcohol. 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 reports that he has never smoked. He has never used smokeless tobacco. He reports that he does not drink alcohol.  family history includes Diabetes in his mother. Physical Exam   Nursing note and vitals reviewed. Blood pressure 133/79, pulse 86, resp. rate 16, height 6' 1\" (1.854 m), weight 218 lb 12.8 oz (99.2 kg), SpO2 95 %. Constitutional:  No distress. Eyes: Conjunctivae are normal.   Ears:  Hearing grossly intact  Cardiovascular: Normal rate. regular rhythm, no murmurs or gallops  No edema  Pulmonary/Chest: Effort normal.   CTAB  Musculoskeletal: moves all 4 extremities   Mild right ankle edema. Right foot erythema, mild  Neurological: Alert and oriented to person, place, and time. Skin: No rash noted. Psychiatric: Normal mood and affect. Behavior is normal.     ASSESSMENT and PLAN  Diagnoses and all orders for this visit:    1. Edema of right ankle  2. Venous insufficiency  Mild at this point. Observe for now. Low sodium diet. No clear s/s of DVT. Return to clinic for further evaluation if new symptoms develop or if current symptoms worsen or fail to resolve. 3. Cough- mild. Suspect this is Post-nasal drip. He declines CXR today, but will RTC prn  -     XR CHEST PA LAT; Future    4. Allergic rhinitis due to pollen, unspecified seasonality  Saline rinse, flonase    5. Benign essential HTN- Controlled on current regimen. Continue current medications as written in chart. 6. Hypercholesterolemia- Controlled on current regimen. Continue current medications as written in chart    7. DNR (do not resuscitate)  -     DO NOT RESUSCITATE    8. Phantom pain [facility])  Assessment & Plan:  Stable, based on history, physical exam and review of pertinent labs, studies and medications; meds reconciled; changes to treatment plan as per orders. Lab Results   Component Value Date/Time    WBC 8.4 07/24/2017 11:58 AM    HGB 16.0 07/24/2017 11:58 AM    HCT 48.8 07/24/2017 11:58 AM    PLATELET 028 00/28/1311 11:58 AM    Creatinine 1.00 07/24/2017 11:58 AM    BUN 16 07/24/2017 11:58 AM    Potassium 4.1 07/24/2017 11:58 AM         9.  Malignant melanoma of upper extremity, including shoulder, unspecified laterality [facility])  Assessment & Plan:  Stable, based on history, physical exam and review of pertinent labs, studies and medications; meds reconciled; continue current treatment plan., This condition is managed by Specialist.  Key Oncology Meds     Patient is on no Oncologic meds. Key Pain Meds             acetaminophen-codeine (TYLENOL #3) 300-30 mg per tablet  (Taking) Take 1 Tab by mouth every six (6) hours as needed for Pain. Max Daily Amount: 4 Tabs. Appointment due        Lab Results   Component Value Date/Time    WBC 8.4 07/24/2017 11:58 AM    ABS. NEUTROPHILS 6.2 07/24/2017 11:58 AM    HGB 16.0 07/24/2017 11:58 AM    HCT 48.8 07/24/2017 11:58 AM    PLATELET 465 13/59/7578 11:58 AM    Creatinine 1.00 07/24/2017 11:58 AM    BUN 16 07/24/2017 11:58 AM    ALT (SGPT) 18 07/24/2017 11:58 AM    AST (SGOT) 20 07/24/2017 11:58 AM    Albumin 4.4 07/24/2017 11:58 AM    Prostate Specific Ag 2.3 07/24/2017 11:58 AM         10. Amputation of arm [facility])  Assessment & Plan:  Stable, based on history, physical exam and review of pertinent labs, studies and medications; meds reconciled; continue current treatment plan. Lab Results   Component Value Date/Time    WBC 8.4 07/24/2017 11:58 AM    HGB 16.0 07/24/2017 11:58 AM    HCT 48.8 07/24/2017 11:58 AM    PLATELET 771 04/59/1470 11:58 AM    Creatinine 1.00 07/24/2017 11:58 AM    BUN 16 07/24/2017 11:58 AM    Potassium 4.1 07/24/2017 11:58 AM         11. Thrombocytopenia ([address]  Assessment & Plan:  Stable, based on history, physical exam and review of pertinent labs, studies and medications; meds reconciled; continue current treatment plan. Lab Results   Component Value Date/Time    WBC 8.4 07/24/2017 11:58 AM    HGB 16.0 07/24/2017 11:58 AM    HCT 48.8 07/24/2017 11:58 AM    PLATELET 702 46/96/6370 11:58 AM    Creatinine 1.00 07/24/2017 11:58 AM    BUN 16 07/24/2017 11:58 AM    Potassium 4.1 07/24/2017 11:58 AM         12. Benign prostatic hyperplasia with urinary retention  -     tadalafil (CIALIS) 5 mg tablet; Take 1 Tab by mouth daily.  Failed tamsulosin  Indications: benign prostatic hyperplasia with lower urinary tract sx, Erectile Dysfunction    13. Erectile dysfunction, unspecified erectile dysfunction type  -     tadalafil (CIALIS) 5 mg tablet; Take 1 Tab by mouth daily. Failed tamsulosin  Indications: benign prostatic hyperplasia with lower urinary tract sx, Erectile Dysfunction    lab results and schedule of future lab studies reviewed with patient  reviewed medications and side effects in detail    Return to clinic for further evaluation if new symptoms develop    Follow-up Disposition: Not on File    Current Outpatient Prescriptions   Medication Sig    tadalafil (CIALIS) 5 mg tablet Take 1 Tab by mouth daily. Failed tamsulosin  Indications: benign prostatic hyperplasia with lower urinary tract sx, Erectile Dysfunction    acetaminophen-codeine (TYLENOL #3) 300-30 mg per tablet Take 1 Tab by mouth every six (6) hours as needed for Pain. Max Daily Amount: 4 Tabs. Appointment due    metoprolol succinate (TOPROL-XL) 25 mg XL tablet TAKE 1 TABLET BY MOUTH EVERY DAY    valsartan (DIOVAN) 80 mg tablet Take 1 Tab by mouth daily.  tamsulosin (FLOMAX) 0.4 mg capsule Take 1 Cap by mouth daily.  simvastatin (ZOCOR) 40 mg tablet Take 1 Tab by mouth nightly.  zolpidem (AMBIEN) 10 mg tablet Take  by mouth nightly as needed for Sleep.  MULTIVIT &MINERALS/FERROUS FUM (MULTI VITAMIN PO) Take  by mouth.  ASCORBATE CALCIUM (VITAMIN C PO) Take  by mouth. No current facility-administered medications for this visit.

## 2018-04-17 NOTE — ASSESSMENT & PLAN NOTE
Stable, based on history, physical exam and review of pertinent labs, studies and medications; meds reconciled; continue current treatment plan.   Lab Results   Component Value Date/Time    WBC 8.4 07/24/2017 11:58 AM    HGB 16.0 07/24/2017 11:58 AM    HCT 48.8 07/24/2017 11:58 AM    PLATELET 033 49/99/1230 11:58 AM    Creatinine 1.00 07/24/2017 11:58 AM    BUN 16 07/24/2017 11:58 AM    Potassium 4.1 07/24/2017 11:58 AM

## 2018-04-17 NOTE — ASSESSMENT & PLAN NOTE
Stable, based on history, physical exam and review of pertinent labs, studies and medications; meds reconciled; continue current treatment plan., This condition is managed by Specialist.  Key Oncology Meds     Patient is on no Oncologic meds. Key Pain Meds             acetaminophen-codeine (TYLENOL #3) 300-30 mg per tablet  (Taking) Take 1 Tab by mouth every six (6) hours as needed for Pain. Max Daily Amount: 4 Tabs. Appointment due        Lab Results   Component Value Date/Time    WBC 8.4 07/24/2017 11:58 AM    ABS.  NEUTROPHILS 6.2 07/24/2017 11:58 AM    HGB 16.0 07/24/2017 11:58 AM    HCT 48.8 07/24/2017 11:58 AM    PLATELET 596 91/12/0741 11:58 AM    Creatinine 1.00 07/24/2017 11:58 AM    BUN 16 07/24/2017 11:58 AM    ALT (SGPT) 18 07/24/2017 11:58 AM    AST (SGOT) 20 07/24/2017 11:58 AM    Albumin 4.4 07/24/2017 11:58 AM    Prostate Specific Ag 2.3 07/24/2017 11:58 AM

## 2018-04-17 NOTE — PROGRESS NOTES
Frances Damico is a 76 y.o. male    1. Have you been to the ER, urgent care clinic since your last visit? Hospitalized since your last visit? No    2. Have you seen or consulted any other health care providers outside of the [address] [place] [address] since your last visit? Include any pap smears or colon screening. No    Chief Complaint   Patient presents with    Foot Swelling     right foot is soar and swollen for about 10 days.     Cold Symptoms     chest congestion for several months         Visit Vitals    /79 (BP 1 Location: Right arm, BP Patient Position: Sitting)    Pulse 86    Resp 16    Ht 6' 1\" (1.854 m)    Wt 218 lb 12.8 oz (99.2 kg)    SpO2 95%    BMI 28.87 kg/m2     Patient states he wants to sign a DNR

## 2018-04-26 DIAGNOSIS — I25.10 CAD IN NATIVE ARTERY: ICD-10-CM

## 2018-04-26 RX ORDER — METOPROLOL SUCCINATE 25 MG/1
TABLET, EXTENDED RELEASE ORAL
Qty: 90 TAB | Refills: 1 | Status: SHIPPED | OUTPATIENT
Start: 2018-04-26 | End: 2018-04-27 | Stop reason: SDUPTHER

## 2018-04-27 ENCOUNTER — HOSPITAL ENCOUNTER (OUTPATIENT)
Dept: GENERAL RADIOLOGY | Age: 78
Discharge: HOME | End: 2018-04-27
Attending: INTERNAL MEDICINE
Payer: MEDICARE

## 2018-04-27 ENCOUNTER — OFFICE VISIT (OUTPATIENT)
Dept: INTERNAL MEDICINE CLINIC | Age: 78
End: 2018-04-27

## 2018-04-27 VITALS
WEIGHT: 210.6 LBS | OXYGEN SATURATION: 94 % | BODY MASS INDEX: 27.91 KG/M2 | HEIGHT: 73 IN | SYSTOLIC BLOOD PRESSURE: 123 MMHG | TEMPERATURE: 97.6 F | RESPIRATION RATE: 18 BRPM | DIASTOLIC BLOOD PRESSURE: 76 MMHG | HEART RATE: 85 BPM

## 2018-04-27 DIAGNOSIS — R05.9 COUGH: ICD-10-CM

## 2018-04-27 DIAGNOSIS — I25.10 CAD IN NATIVE ARTERY: ICD-10-CM

## 2018-04-27 DIAGNOSIS — M79.671 RIGHT FOOT PAIN: Primary | ICD-10-CM

## 2018-04-27 DIAGNOSIS — M79.671 RIGHT FOOT PAIN: ICD-10-CM

## 2018-04-27 PROCEDURE — 71046 X-RAY EXAM CHEST 2 VIEWS: CPT

## 2018-04-27 PROCEDURE — 73630 X-RAY EXAM OF FOOT: CPT

## 2018-04-27 RX ORDER — FUROSEMIDE 20 MG/1
20 TABLET ORAL
Qty: 30 TAB | Refills: 0 | Status: SHIPPED | OUTPATIENT
Start: 2018-04-27 | End: 2018-05-30 | Stop reason: SDUPTHER

## 2018-04-27 RX ORDER — METOPROLOL SUCCINATE 25 MG/1
25 TABLET, EXTENDED RELEASE ORAL DAILY
Qty: 90 TAB | Refills: 1 | Status: SHIPPED | OUTPATIENT
Start: 2018-04-27 | End: 2018-05-07 | Stop reason: SDUPTHER

## 2018-04-27 NOTE — PROGRESS NOTES
Chief Complaint   Patient presents with    Foot Pain     Follow up for right foot and ankle pain     1. Have you been to the ER, urgent care clinic since your last visit? Hospitalized since your last visit? NO    2. Have you seen or consulted any other health care providers outside of the [address] [place] [address] since your last visit? Include any pap smears or colon screening.  NO

## 2018-04-27 NOTE — MR AVS SNAPSHOT
[facility] 
 
 
 [address] 
[phone] Patient: Ruthie Patterson MRN: [id number] WNF:[birth year] Visit Information Date & Time Provider Department Dept. Phone Encounter #  
 4/27/2018  3:30 PM Saritha Vazquez MD [facility] [address] [id number] Upcoming Health Maintenance Date Due ZOSTER VACCINE AGE 60> 5/21/1998 GLAUCOMA SCREENING Q2Y 7/21/2003 Pneumococcal 65+ High/Highest Risk (2 of 2 - PCV13) 3/24/2017 MEDICARE YEARLY EXAM 7/25/2018 Influenza Age 5 to Adult 8/1/2018 DTaP/Tdap/Td series (2 - Td) 7/24/2027 Allergies as of 4/27/2018  Review Complete On: 4/27/2018 By: Saritha Vazquez MD  
 No Known Allergies Current Immunizations  Reviewed on 3/24/2016 Name Date Influenza Vaccine 10/15/2016 Pneumococcal Polysaccharide (PPSV-23) 3/24/2016 Not reviewed this visit You Were Diagnosed With   
  
 Codes Comments Right foot pain    -  Primary ICD-10-CM: J20.181 ICD-9-CM: 729.5   
 CAD in native artery     ICD-10-CM: I25.10 ICD-9-CM: 414.01 Vitals BP Pulse Temp Resp Height(growth percentile) Weight(growth percentile) 123/76 (BP 1 Location: Right arm, BP Patient Position: Sitting) 85 97.6 °F (36.4 °C) (Oral) 18 6' 1\" (1.854 m) 210 lb 9.6 oz (95.5 kg) SpO2 BMI Smoking Status 94% 27.79 kg/m2 Never Smoker Vitals History BMI and BSA Data Body Mass Index Body Surface Area  
 27.79 kg/m 2 2.22 m 2 Preferred Pharmacy Pharmacy Name Phone [id number] [address], [address] [phone] Your Updated Medication List  
  
   
This list is accurate as of 4/27/18  4:09 PM.  Always use your most recent med list.  
  
  
  
  
 acetaminophen-codeine 300-30 mg per tablet Commonly known as:  TYLENOL #3 Take 1 Tab by mouth every six (6) hours as needed for Pain.  Max Daily Amount: 4 Tabs. Appointment due AMBIEN 10 mg tablet Generic drug:  zolpidem Take  by mouth nightly as needed for Sleep.  
  
 furosemide 20 mg tablet Commonly known as:  LASIX Take 1 Tab by mouth daily as needed. As needed for edema/swelling  
  
 metoprolol succinate 25 mg XL tablet Commonly known as:  TOPROL-XL Take 1 Tab by mouth daily. MULTI VITAMIN PO Take  by mouth. simvastatin 40 mg tablet Commonly known as:  ZOCOR Take 1 Tab by mouth nightly. tamsulosin 0.4 mg capsule Commonly known as:  FLOMAX Take 1 Cap by mouth daily. valsartan 80 mg tablet Commonly known as:  DIOVAN Take 1 Tab by mouth daily. VITAMIN C PO Take  by mouth. Prescriptions Printed Refills  
 metoprolol succinate (TOPROL-XL) 25 mg XL tablet 1 Sig: Take 1 Tab by mouth daily. Class: Print Route: Oral  
 furosemide (LASIX) 20 mg tablet 0 Sig: Take 1 Tab by mouth daily as needed. As needed for edema/swelling Class: Print Route: Oral  
  
To-Do List   
 04/27/2018 Imaging:  XR FOOT RT MIN 3 V Introducing [facility] SERVICES! Dear Genesis Josue: Thank you for requesting a Webspy account. Our records indicate that you already have an active Webspy account. You can access your account anytime at [email or web address] Did you know that you can access your hospital and ER discharge instructions at any time in Webspy? You can also review all of your test results from your hospital stay or ER visit. Additional Information If you have questions, please visit the Frequently Asked Questions section of the Webspy website at [email or web address] Remember, Webspy is NOT to be used for urgent needs. For medical emergencies, dial 911. Now available from your iPhone and Android! Please provide this summary of care documentation to your next provider. Your primary care clinician is listed as Thayne Eisenmenger. If you have any questions after today's visit, please call [phone].

## 2018-04-27 NOTE — PROGRESS NOTES
HISTORY OF PRESENT ILLNESS    Presents with  right dorsal foot pain and mild swelling for several week(s). Was seen 10 days ago for this. He has been using ice and trying to elevate it which does help some, but the pain is a often severe, but is improved again over the past couple of days. Denies any known injuries. Taking Tylenol without relief. 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 reports that he has never smoked. He has never used smokeless tobacco. He reports that he does not drink alcohol.  family history includes Diabetes in his mother. Physical Exam   Nursing note and vitals reviewed. Blood pressure 123/76, pulse 85, temperature 97.6 °F (36.4 °C), temperature source Oral, resp. rate 18, height 6' 1\" (1.854 m), weight 210 lb 9.6 oz (95.5 kg), SpO2 94 %. Constitutional: In no distress. Eyes: Conjunctivae are normal.  HEENT:  No LAD or thyromegaly   Cardiovascular: Normal rate. regular rhythm. No murmurs  No edema  Pulmonary/Chest: Effort normal. clear to ausculation blaterally  Musculoskeletal: Right dorsal foot with mild tenderness laterally, no erythema or warmth.   Full range of motion of right ankle. No significant ankle edema. Abd:  Neurological: Alert and oriented. Grossly intact cranial nerves and motor function. Skin: No rash noted. Psychiatric: Normal mood and affect. Behavior is normal.     ASSESSMENT and PLAN  Diagnoses and all orders for this visit:    1. Right foot pain  X-ray today shows mild midfoot DJD. Suspect this is arthritic pain and/or a sprain, soft tissue injury. He does have mild associated edema on the dorsal foot and I agreed to give Lasix for 7 days as needed. Stop if not improving after a few days. Ice, elevate, rest.  Can refer to podiatrist if needed  -     XR FOOT RT MIN 3 V; Future  -     furosemide (LASIX) 20 mg tablet; Take 1 Tab by mouth daily as needed. As needed for edema/swelling    2. CAD in native artery  Currently asymptomatic  -     metoprolol succinate (TOPROL-XL) 25 mg XL tablet; Take 1 Tab by mouth daily. lab results and schedule of future lab studies reviewed with patient  reviewed medications and side effects in detail  Return to clinic for further evaluation if new symptoms develop or if current symptoms worsen or fail to resolve. There are no Patient Instructions on file for this visit.

## 2018-05-07 DIAGNOSIS — I25.10 CAD IN NATIVE ARTERY: ICD-10-CM

## 2018-05-07 RX ORDER — METOPROLOL SUCCINATE 25 MG/1
25 TABLET, EXTENDED RELEASE ORAL DAILY
Qty: 90 TAB | Refills: 1 | Status: SHIPPED | OUTPATIENT
Start: 2018-05-07 | End: 2019-03-16

## 2018-05-27 DIAGNOSIS — G54.6 PHANTOM PAIN (HCC): ICD-10-CM

## 2018-05-29 RX ORDER — ACETAMINOPHEN AND CODEINE PHOSPHATE 300; 30 MG/1; MG/1
1 TABLET ORAL
Qty: 30 TAB | Refills: 0 | OUTPATIENT
Start: 2018-05-29 | End: 2018-09-07 | Stop reason: SDUPTHER

## 2018-05-29 NOTE — TELEPHONE ENCOUNTER
From: Vishal Balderrama  To: Natalie Sharp MD  Sent: 5/27/2018 9:55 AM EDT  Subject: Medication Renewal Request    Original authorizing provider: MD Vishal Erwin would like a refill of the following medications:  acetaminophen-codeine (TYLENOL #3) 300-30 mg per tablet Natalie Sharp MD]    Preferred pharmacy: [address] [place] [address] [place]:

## 2018-08-20 ENCOUNTER — PATIENT MESSAGE (OUTPATIENT)
Dept: INTERNAL MEDICINE CLINIC | Age: 78
End: 2018-08-20

## 2018-08-20 RX ORDER — LOSARTAN POTASSIUM 50 MG/1
50 TABLET ORAL DAILY
Qty: 30 TAB | Refills: 3 | Status: SHIPPED | OUTPATIENT
Start: 2018-08-20 | End: 2018-08-21 | Stop reason: SDUPTHER

## 2018-08-21 RX ORDER — LOSARTAN POTASSIUM 50 MG/1
TABLET ORAL
Qty: 90 TAB | Refills: 1 | Status: SHIPPED | OUTPATIENT
Start: 2018-08-21 | End: 2019-03-16

## 2018-09-11 DIAGNOSIS — G54.6 PHANTOM PAIN (HCC): ICD-10-CM

## 2018-09-11 RX ORDER — ACETAMINOPHEN AND CODEINE PHOSPHATE 300; 30 MG/1; MG/1
1 TABLET ORAL
Qty: 30 TAB | Refills: 0 | OUTPATIENT
Start: 2018-09-11 | End: 2018-10-14 | Stop reason: SDUPTHER

## 2018-10-01 DIAGNOSIS — N40.0 BENIGN NON-NODULAR PROSTATIC HYPERPLASIA WITHOUT LOWER URINARY TRACT SYMPTOMS: ICD-10-CM

## 2018-10-01 RX ORDER — DUTASTERIDE 0.5 MG/1
CAPSULE, LIQUID FILLED ORAL
Qty: 90 CAP | Refills: 1 | Status: SHIPPED | OUTPATIENT
Start: 2018-10-01 | End: 2019-01-24 | Stop reason: SDUPTHER

## 2018-10-01 RX ORDER — DUTASTERIDE 0.5 MG/1
CAPSULE, LIQUID FILLED ORAL
Qty: 90 CAP | Refills: 1 | Status: SHIPPED | OUTPATIENT
Start: 2018-10-01 | End: 2018-10-01 | Stop reason: SDUPTHER

## 2018-10-02 DIAGNOSIS — E78.00 HYPERCHOLESTEROLEMIA: ICD-10-CM

## 2018-10-02 DIAGNOSIS — I10 BENIGN ESSENTIAL HTN: ICD-10-CM

## 2018-10-02 RX ORDER — VALSARTAN 80 MG/1
TABLET ORAL
Qty: 90 TAB | Refills: 1 | OUTPATIENT
Start: 2018-10-02

## 2018-10-02 RX ORDER — SIMVASTATIN 40 MG/1
TABLET, FILM COATED ORAL
Qty: 90 TAB | Refills: 1 | Status: SHIPPED | OUTPATIENT
Start: 2018-10-02 | End: 2019-04-03 | Stop reason: SDUPTHER

## 2018-10-14 ENCOUNTER — TELEPHONE (OUTPATIENT)
Dept: INTERNAL MEDICINE CLINIC | Age: 78
End: 2018-10-14

## 2018-10-14 DIAGNOSIS — G54.6 PHANTOM PAIN (HCC): ICD-10-CM

## 2018-10-15 RX ORDER — ACETAMINOPHEN AND CODEINE PHOSPHATE 300; 30 MG/1; MG/1
1 TABLET ORAL
Qty: 30 TAB | Refills: 0 | OUTPATIENT
Start: 2018-10-15 | End: 2019-01-24 | Stop reason: SDUPTHER

## 2018-10-16 NOTE — TELEPHONE ENCOUNTER
From: Michael Simon  To: Kamryn Marroquin MD  Sent: 10/14/2018 5:16 PM EDT  Subject: Medication Renewal Request    Original authorizing provider: MD Michael Rich would like a refill of the following medications:  acetaminophen-codeine (TYLENOL #3) 300-30 mg per tablet Kamryn Marroquin MD]    Preferred pharmacy: [address] [place]:

## 2018-12-10 DIAGNOSIS — I25.10 CAD IN NATIVE ARTERY: ICD-10-CM

## 2018-12-10 RX ORDER — METOPROLOL SUCCINATE 25 MG/1
TABLET, EXTENDED RELEASE ORAL
Qty: 90 TAB | Refills: 1 | Status: SHIPPED | OUTPATIENT
Start: 2018-12-10 | End: 2019-03-16 | Stop reason: SDUPTHER

## 2018-12-14 DIAGNOSIS — I25.10 CAD IN NATIVE ARTERY: ICD-10-CM

## 2018-12-14 RX ORDER — METOPROLOL SUCCINATE 25 MG/1
TABLET, EXTENDED RELEASE ORAL
Qty: 90 TAB | Refills: 1 | Status: SHIPPED | OUTPATIENT
Start: 2018-12-14 | End: 2019-03-16

## 2019-01-24 DIAGNOSIS — G54.6 PHANTOM PAIN (HCC): ICD-10-CM

## 2019-01-24 RX ORDER — ACETAMINOPHEN AND CODEINE PHOSPHATE 300; 30 MG/1; MG/1
1 TABLET ORAL
Qty: 30 TAB | Refills: 0 | OUTPATIENT
Start: 2019-01-24 | End: 2019-01-28 | Stop reason: SDUPTHER

## 2019-01-24 RX ORDER — DUTASTERIDE 0.5 MG/1
CAPSULE, LIQUID FILLED ORAL
Qty: 90 CAP | Refills: 1 | Status: SHIPPED | OUTPATIENT
Start: 2019-01-24 | End: 2019-03-16

## 2019-01-28 DIAGNOSIS — G54.6 PHANTOM PAIN (HCC): ICD-10-CM

## 2019-01-28 RX ORDER — ACETAMINOPHEN AND CODEINE PHOSPHATE 300; 30 MG/1; MG/1
TABLET ORAL
Qty: 30 TAB | Refills: 0 | Status: SHIPPED | OUTPATIENT
Start: 2019-01-28 | End: 2019-04-04 | Stop reason: SDUPTHER

## 2019-03-16 ENCOUNTER — HOSPITAL ENCOUNTER (INPATIENT)
Age: 79
LOS: 1 days | Discharge: HOME | DRG: 872 | End: 2019-03-17
Attending: EMERGENCY MEDICINE | Admitting: INTERNAL MEDICINE
Payer: MEDICARE

## 2019-03-16 ENCOUNTER — TELEPHONE (OUTPATIENT)
Dept: INTERNAL MEDICINE CLINIC | Age: 79
End: 2019-03-16

## 2019-03-16 ENCOUNTER — APPOINTMENT (OUTPATIENT)
Dept: GENERAL RADIOLOGY | Age: 79
DRG: 872 | End: 2019-03-16
Attending: EMERGENCY MEDICINE
Payer: MEDICARE

## 2019-03-16 ENCOUNTER — APPOINTMENT (OUTPATIENT)
Dept: CT IMAGING | Age: 79
DRG: 872 | End: 2019-03-16
Attending: EMERGENCY MEDICINE
Payer: MEDICARE

## 2019-03-16 DIAGNOSIS — N39.0 URINARY TRACT INFECTION WITH HEMATURIA, SITE UNSPECIFIED: Primary | ICD-10-CM

## 2019-03-16 DIAGNOSIS — R09.02 HYPOXIA: ICD-10-CM

## 2019-03-16 DIAGNOSIS — J10.1 INFLUENZA A: ICD-10-CM

## 2019-03-16 DIAGNOSIS — R31.9 URINARY TRACT INFECTION WITH HEMATURIA, SITE UNSPECIFIED: Primary | ICD-10-CM

## 2019-03-16 PROBLEM — R50.9 FEVER CHILLS: Status: ACTIVE | Noted: 2019-03-16

## 2019-03-16 PROBLEM — A41.9 SEPSIS (HCC): Status: ACTIVE | Noted: 2019-03-16

## 2019-03-16 PROBLEM — D72.825 BANDEMIA: Status: ACTIVE | Noted: 2019-03-16

## 2019-03-16 PROBLEM — E87.1 HYPONATREMIA: Status: ACTIVE | Noted: 2019-03-16

## 2019-03-16 LAB
ALBUMIN SERPL-MCNC: 3.6 G/DL (ref 3.5–5)
ALBUMIN/GLOB SERPL: 1.1 {RATIO} (ref 1.1–2.2)
ALP SERPL-CCNC: 80 U/L (ref 45–117)
ALT SERPL-CCNC: 24 U/L (ref 12–78)
ANION GAP SERPL CALC-SCNC: 10 MMOL/L (ref 5–15)
APPEARANCE UR: ABNORMAL
AST SERPL-CCNC: 32 U/L (ref 15–37)
BACTERIA URNS QL MICRO: ABNORMAL /HPF
BASOPHILS # BLD: 0 K/UL (ref 0–0.1)
BASOPHILS NFR BLD: 0 % (ref 0–1)
BILIRUB SERPL-MCNC: 0.7 MG/DL (ref 0.2–1)
BILIRUB UR QL: NEGATIVE
BUN SERPL-MCNC: 12 MG/DL (ref 6–20)
BUN/CREAT SERPL: 13 (ref 12–20)
CALCIUM SERPL-MCNC: 8.3 MG/DL (ref 8.5–10.1)
CHLORIDE SERPL-SCNC: 102 MMOL/L (ref 97–108)
CK SERPL-CCNC: 569 U/L (ref 39–308)
CO2 SERPL-SCNC: 21 MMOL/L (ref 21–32)
COLOR UR: ABNORMAL
COMMENT, HOLDF: NORMAL
COMMENT, HOLDF: NORMAL
CREAT SERPL-MCNC: 0.92 MG/DL (ref 0.7–1.3)
DIFFERENTIAL METHOD BLD: ABNORMAL
EOSINOPHIL # BLD: 0 K/UL (ref 0–0.4)
EOSINOPHIL NFR BLD: 0 % (ref 0–7)
EPITH CASTS URNS QL MICRO: ABNORMAL /LPF
ERYTHROCYTE [DISTWIDTH] IN BLOOD BY AUTOMATED COUNT: 15.6 % (ref 11.5–14.5)
FLUAV AG NPH QL IA: POSITIVE
FLUBV AG NOSE QL IA: NEGATIVE
GLOBULIN SER CALC-MCNC: 3.4 G/DL (ref 2–4)
GLUCOSE SERPL-MCNC: 111 MG/DL (ref 65–100)
GLUCOSE UR STRIP.AUTO-MCNC: NEGATIVE MG/DL
HAPTOGLOB SERPL-MCNC: 202 MG/DL (ref 30–200)
HCT VFR BLD AUTO: 44.8 % (ref 36.6–50.3)
HGB BLD-MCNC: 14.6 G/DL (ref 12.1–17)
HGB UR QL STRIP: ABNORMAL
IMM GRANULOCYTES # BLD AUTO: 0 K/UL
IMM GRANULOCYTES NFR BLD AUTO: 0 %
IRON SATN MFR SERPL: 6 % (ref 20–50)
IRON SERPL-MCNC: 14 UG/DL (ref 35–150)
KETONES UR QL STRIP.AUTO: 40 MG/DL
LACTATE BLD-SCNC: 1.19 MMOL/L (ref 0.4–2)
LDH SERPL L TO P-CCNC: 217 U/L (ref 85–241)
LEUKOCYTE ESTERASE UR QL STRIP.AUTO: ABNORMAL
LYMPHOCYTES # BLD: 0.3 K/UL (ref 0.8–3.5)
LYMPHOCYTES NFR BLD: 3 % (ref 12–49)
MCH RBC QN AUTO: 31.6 PG (ref 26–34)
MCHC RBC AUTO-ENTMCNC: 32.6 G/DL (ref 30–36.5)
MCV RBC AUTO: 97 FL (ref 80–99)
MONOCYTES # BLD: 0.8 K/UL (ref 0–1)
MONOCYTES NFR BLD: 8 % (ref 5–13)
NEUTS BAND NFR BLD MANUAL: 6 % (ref 0–6)
NEUTS SEG # BLD: 8.4 K/UL (ref 1.8–8)
NEUTS SEG NFR BLD: 83 % (ref 32–75)
NITRITE UR QL STRIP.AUTO: POSITIVE
NRBC # BLD: 0 K/UL (ref 0–0.01)
NRBC BLD-RTO: 0 PER 100 WBC
PH UR STRIP: 5.5 [PH] (ref 5–8)
PLATELET # BLD AUTO: 73 K/UL (ref 150–400)
PMV BLD AUTO: 9.8 FL (ref 8.9–12.9)
POTASSIUM SERPL-SCNC: 3.7 MMOL/L (ref 3.5–5.1)
PROT SERPL-MCNC: 7 G/DL (ref 6.4–8.2)
PROT UR STRIP-MCNC: 30 MG/DL
RBC # BLD AUTO: 4.62 M/UL (ref 4.1–5.7)
RBC #/AREA URNS HPF: ABNORMAL /HPF (ref 0–5)
RBC MORPH BLD: ABNORMAL
RETICS # AUTO: 0.08 M/UL (ref 0.03–0.1)
RETICS/RBC NFR AUTO: 1.6 % (ref 0.7–2.1)
SAMPLES BEING HELD,HOLD: NORMAL
SAMPLES BEING HELD,HOLD: NORMAL
SODIUM SERPL-SCNC: 133 MMOL/L (ref 136–145)
SP GR UR REFRACTOMETRY: 1.01 (ref 1–1.03)
TIBC SERPL-MCNC: 220 UG/DL (ref 250–450)
TROPONIN I BLD-MCNC: <0.04 NG/ML (ref 0–0.08)
TSH SERPL DL<=0.05 MIU/L-ACNC: 1.12 UIU/ML (ref 0.36–3.74)
UR CULT HOLD, URHOLD: NORMAL
UROBILINOGEN UR QL STRIP.AUTO: 0.2 EU/DL (ref 0.2–1)
WBC # BLD AUTO: 9.5 K/UL (ref 4.1–11.1)
WBC URNS QL MICRO: >100 /HPF (ref 0–4)

## 2019-03-16 PROCEDURE — 87077 CULTURE AEROBIC IDENTIFY: CPT

## 2019-03-16 PROCEDURE — 93005 ELECTROCARDIOGRAM TRACING: CPT

## 2019-03-16 PROCEDURE — 87040 BLOOD CULTURE FOR BACTERIA: CPT

## 2019-03-16 PROCEDURE — 87186 SC STD MICRODIL/AGAR DIL: CPT

## 2019-03-16 PROCEDURE — 74011250636 HC RX REV CODE- 250/636: Performed by: NURSE PRACTITIONER

## 2019-03-16 PROCEDURE — 85045 AUTOMATED RETICULOCYTE COUNT: CPT

## 2019-03-16 PROCEDURE — 36415 COLL VENOUS BLD VENIPUNCTURE: CPT

## 2019-03-16 PROCEDURE — 94760 N-INVAS EAR/PLS OXIMETRY 1: CPT

## 2019-03-16 PROCEDURE — 99285 EMERGENCY DEPT VISIT HI MDM: CPT

## 2019-03-16 PROCEDURE — 74011000258 HC RX REV CODE- 258: Performed by: INTERNAL MEDICINE

## 2019-03-16 PROCEDURE — 74011250636 HC RX REV CODE- 250/636: Performed by: INTERNAL MEDICINE

## 2019-03-16 PROCEDURE — 77010033678 HC OXYGEN DAILY

## 2019-03-16 PROCEDURE — 83010 ASSAY OF HAPTOGLOBIN QUANT: CPT

## 2019-03-16 PROCEDURE — 82728 ASSAY OF FERRITIN: CPT

## 2019-03-16 PROCEDURE — 87633 RESP VIRUS 12-25 TARGETS: CPT

## 2019-03-16 PROCEDURE — 83615 LACTATE (LD) (LDH) ENZYME: CPT

## 2019-03-16 PROCEDURE — 74011250637 HC RX REV CODE- 250/637: Performed by: NURSE PRACTITIONER

## 2019-03-16 PROCEDURE — 83605 ASSAY OF LACTIC ACID: CPT

## 2019-03-16 PROCEDURE — 81001 URINALYSIS AUTO W/SCOPE: CPT

## 2019-03-16 PROCEDURE — 82550 ASSAY OF CK (CPK): CPT

## 2019-03-16 PROCEDURE — 82607 VITAMIN B-12: CPT

## 2019-03-16 PROCEDURE — 87086 URINE CULTURE/COLONY COUNT: CPT

## 2019-03-16 PROCEDURE — 83540 ASSAY OF IRON: CPT

## 2019-03-16 PROCEDURE — 65270000029 HC RM PRIVATE

## 2019-03-16 PROCEDURE — 71045 X-RAY EXAM CHEST 1 VIEW: CPT

## 2019-03-16 PROCEDURE — 87804 INFLUENZA ASSAY W/OPTIC: CPT

## 2019-03-16 PROCEDURE — 94761 N-INVAS EAR/PLS OXIMETRY MLT: CPT

## 2019-03-16 PROCEDURE — 74011250637 HC RX REV CODE- 250/637: Performed by: INTERNAL MEDICINE

## 2019-03-16 PROCEDURE — 70450 CT HEAD/BRAIN W/O DYE: CPT

## 2019-03-16 PROCEDURE — 85025 COMPLETE CBC W/AUTO DIFF WBC: CPT

## 2019-03-16 PROCEDURE — 74011000250 HC RX REV CODE- 250: Performed by: NURSE PRACTITIONER

## 2019-03-16 PROCEDURE — 82746 ASSAY OF FOLIC ACID SERUM: CPT

## 2019-03-16 PROCEDURE — 80053 COMPREHEN METABOLIC PANEL: CPT

## 2019-03-16 PROCEDURE — 84443 ASSAY THYROID STIM HORMONE: CPT

## 2019-03-16 PROCEDURE — 84484 ASSAY OF TROPONIN QUANT: CPT

## 2019-03-16 RX ORDER — METOPROLOL SUCCINATE 25 MG/1
12.5 TABLET, EXTENDED RELEASE ORAL DAILY
Status: DISCONTINUED | OUTPATIENT
Start: 2019-03-17 | End: 2019-03-17 | Stop reason: HOSPADM

## 2019-03-16 RX ORDER — ENOXAPARIN SODIUM 100 MG/ML
40 INJECTION SUBCUTANEOUS EVERY 24 HOURS
Status: DISCONTINUED | OUTPATIENT
Start: 2019-03-16 | End: 2019-03-17 | Stop reason: HOSPADM

## 2019-03-16 RX ORDER — DIPHENHYDRAMINE HCL 25 MG
25 CAPSULE ORAL
Status: DISCONTINUED | OUTPATIENT
Start: 2019-03-16 | End: 2019-03-17 | Stop reason: HOSPADM

## 2019-03-16 RX ORDER — OSELTAMIVIR PHOSPHATE 75 MG/1
75 CAPSULE ORAL ONCE
Status: COMPLETED | OUTPATIENT
Start: 2019-03-16 | End: 2019-03-16

## 2019-03-16 RX ORDER — OSELTAMIVIR PHOSPHATE 75 MG/1
75 CAPSULE ORAL EVERY 12 HOURS
Status: DISCONTINUED | OUTPATIENT
Start: 2019-03-16 | End: 2019-03-17 | Stop reason: HOSPADM

## 2019-03-16 RX ORDER — DUTASTERIDE 0.5 MG/1
0.5 CAPSULE, LIQUID FILLED ORAL DAILY
Status: CANCELLED | OUTPATIENT
Start: 2019-03-17

## 2019-03-16 RX ORDER — DEXTROSE MONOHYDRATE AND SODIUM CHLORIDE 5; .9 G/100ML; G/100ML
100 INJECTION, SOLUTION INTRAVENOUS CONTINUOUS
Status: DISCONTINUED | OUTPATIENT
Start: 2019-03-16 | End: 2019-03-17 | Stop reason: HOSPADM

## 2019-03-16 RX ORDER — ONDANSETRON 2 MG/ML
4 INJECTION INTRAMUSCULAR; INTRAVENOUS
Status: DISCONTINUED | OUTPATIENT
Start: 2019-03-16 | End: 2019-03-17 | Stop reason: HOSPADM

## 2019-03-16 RX ORDER — ACETAMINOPHEN 325 MG/1
650 TABLET ORAL
Status: DISCONTINUED | OUTPATIENT
Start: 2019-03-16 | End: 2019-03-17 | Stop reason: HOSPADM

## 2019-03-16 RX ORDER — METOPROLOL SUCCINATE 25 MG/1
12.5 TABLET, EXTENDED RELEASE ORAL DAILY
COMMUNITY
End: 2019-06-13 | Stop reason: SDUPTHER

## 2019-03-16 RX ORDER — TAMSULOSIN HYDROCHLORIDE 0.4 MG/1
0.4 CAPSULE ORAL DAILY
Status: DISCONTINUED | OUTPATIENT
Start: 2019-03-17 | End: 2019-03-17 | Stop reason: HOSPADM

## 2019-03-16 RX ORDER — SODIUM CHLORIDE 0.9 % (FLUSH) 0.9 %
5-10 SYRINGE (ML) INJECTION AS NEEDED
Status: DISCONTINUED | OUTPATIENT
Start: 2019-03-16 | End: 2019-03-17 | Stop reason: HOSPADM

## 2019-03-16 RX ORDER — SIMVASTATIN 20 MG/1
40 TABLET, FILM COATED ORAL
Status: DISCONTINUED | OUTPATIENT
Start: 2019-03-16 | End: 2019-03-17 | Stop reason: HOSPADM

## 2019-03-16 RX ORDER — ZOLPIDEM TARTRATE 5 MG/1
5 TABLET ORAL
Status: DISCONTINUED | OUTPATIENT
Start: 2019-03-16 | End: 2019-03-17 | Stop reason: HOSPADM

## 2019-03-16 RX ADMIN — BENZOCAINE, MENTHOL 1 LOZENGE: 15; 3.6 LOZENGE ORAL at 20:26

## 2019-03-16 RX ADMIN — OSELTAMIVIR PHOSPHATE 75 MG: 75 CAPSULE ORAL at 14:31

## 2019-03-16 RX ADMIN — ENOXAPARIN SODIUM 40 MG: 40 INJECTION SUBCUTANEOUS at 21:12

## 2019-03-16 RX ADMIN — DEXTROSE MONOHYDRATE AND SODIUM CHLORIDE 100 ML/HR: 5; .9 INJECTION, SOLUTION INTRAVENOUS at 15:59

## 2019-03-16 RX ADMIN — OSELTAMIVIR PHOSPHATE 75 MG: 75 CAPSULE ORAL at 21:12

## 2019-03-16 RX ADMIN — CEFTRIAXONE SODIUM 1 G: 1 INJECTION, POWDER, FOR SOLUTION INTRAMUSCULAR; INTRAVENOUS at 14:32

## 2019-03-16 RX ADMIN — SIMVASTATIN 40 MG: 20 TABLET, FILM COATED ORAL at 21:12

## 2019-03-16 NOTE — PROGRESS NOTES
Primary Nurse Francisco Thurston and Andrei Dorado, RN performed a dual skin assessment on this patient Impairment noted- see wound doc flow sheet  Alan score is 17    Left arm amputated, small scab on left arm stump, small scab on right arm shoulder

## 2019-03-16 NOTE — PROGRESS NOTES
[facility]: MED RECONCILIATION      Medications added:   · [employer] eye gtts    Medications removed:  · Dutasteride 0.5mg daily  · Furosemide 20mg daily  · Losartan 91VO daily  · Duplicate metoprolol xl 25mg daily orders    Medications adjusted:  · Ambien- previously 10mg nightly prn    Information obtained from: Medication bottles, patient (alert, oriented, good historian), RxQuery      Allergies: Patient has no known allergies. Prior to Admission Medications:     Medication Documentation Review Audit       Reviewed by Hussain [place]., PHARMD (Pharmacist) on 03/16/19 at [phone]      Medication Sig Documenting Provider Last Dose Status Taking?   acetaminophen-codeine (TYLENOL #3) 300-30 mg per tablet TAKE 1 TABLET BY MOUTH EVERY 6 HOURS AS NEEDED FOR PAIN Lorena Chavarria MD  Active Yes   ASCORBATE CALCIUM (VITAMIN C PO) Take  by mouth. Provider, Historical 3/16/2019 Unknown time Active Yes   metoprolol succinate (TOPROL-XL) 25 mg XL tablet Take 12.5 mg by mouth daily. Provider, Historical 3/16/2019 AM Active Yes   metoprolol succinate (TOPROL-XL) XL tablet 12.5 mg  Jer Arevalo MD  Active    MULTIVIT &MINERALS/FERROUS FUM (MULTI VITAMIN PO) Take  by mouth. Provider, Historical 3/16/2019 AM Active Yes   OTHER,NON-FORMULARY, [employer] dry eye 1 gtt both eyes night Provider, Historical  Active Yes   simvastatin (ZOCOR) 40 mg tablet TAKE 1 TABLET BY MOUTH NIGHTLY Lorena Chavarria MD 3/15/2019 PM Active Yes   tamsulosin (FLOMAX) 0.4 mg capsule Take 1 Cap by mouth daily. Mariano Collins MD 3/16/2019 AM Active Yes   zolpidem (AMBIEN) 10 mg tablet Take 5 mg by mouth nightly as needed for Sleep. Provider, Historical  Active Yes                    [employer] [place].  Tiana Kaminski

## 2019-03-16 NOTE — PROGRESS NOTES
Advance Care Planning Note    Name: Kana Baer  YOB: 1940  MRN: [id number]  Admission Date: 3/16/2019 12:26 PM    Date of discussion: 3/16/2019    Active Diagnoses:    Hospital Problems  Date Reviewed: 3/16/2019           UTI (urinary tract infection)   Bandemia   Hypoxia   Hyponatremia   Fever chills   Sepsis ([address]   DNR (do not resuscitate)   Thrombocytopenia ([address]   Phantom pain ([address]   Hypercholesterolemia   CAD in native artery   Post-polio syndrome   BPH (benign prostatic hyperplasia)   Insomnia           These active diagnoses are of sufficient risk that focused discussion on advance care planning is indicated in order to allow the patient to thoughtfully consider personal goals of care, and if situations arise that prevent the ability to personally give input, to ensure appropriate representation of their personal desires for different levels and aggressiveness of care. Discussion:     Persons present and participating in discussion: Kana Baer, Shelbie Schaumann, MD, daughter    Discussion: prognosis and treatment of Flu and UTI, insetting of sepsis and hypoxia and chronic debility. Patient states his wife or daughter are spokespersons. He states he completed AD and has declared his deisre to be DNR. Time Spent:     Total time spent face-to-face in education and discussion: 25 minutes.      Shelbie Schaumann, MD  3/16/2019  3:47 PM

## 2019-03-16 NOTE — ED NOTES
TRANSFER - OUT REPORT:(    Verbal report given to Hattie(name) on Frances Damico  being transferred to 5th floorfor routine progression of care       Report consisted of patients Situation, Background, Assessment and   Recommendations(SBAR). Information from the following report(s) SBAR, ED Summary and MAR was reviewed with the receiving nurse. Lines:   Peripheral IV 03/16/19 Right Antecubital (Active)   Site Assessment Clean, dry, & intact 3/16/2019 12:57 PM   Phlebitis Assessment 0 3/16/2019 12:57 PM   Infiltration Assessment 0 3/16/2019 12:57 PM   Dressing Status Clean, dry, & intact 3/16/2019 12:57 PM   Dressing Type Transparent 3/16/2019 12:57 PM   Hub Color/Line Status Pink 3/16/2019 12:57 PM       Peripheral IV 03/16/19 Right Hand (Active)   Site Assessment Clean, dry, & intact 3/16/2019 12:57 PM   Phlebitis Assessment 0 3/16/2019 12:57 PM   Infiltration Assessment 0 3/16/2019 12:57 PM   Dressing Status Clean, dry, & intact 3/16/2019 12:57 PM   Dressing Type Transparent 3/16/2019 12:57 PM   Hub Color/Line Status Pink 3/16/2019 12:57 PM        Opportunity for questions and clarification was provided.       Patient transported with:   O2 @ 3 liters  Registered Nurse

## 2019-03-16 NOTE — ROUTINE PROCESS
Bedside and Verbal shift change report given to [address], RN (oncoming nurse) by Humaira Holloway RN (offgoing nurse). Report included the following information SBAR, Kardex, Intake/Output, MAR, Accordion and Recent Results.

## 2019-03-16 NOTE — ED PROVIDER NOTES
This is a 27-year-old male brought to the emergency room by his daughter secondary to flulike symptoms. The patient started his flulike symptoms 3 days ago, with increasing intensity of the symptoms. Her daughter father fell on Thursday night and was hit in the head with a figurine from his bookshelf was able to get up and returned to bed. Patient fell again yesterday and was unable to get up secondary to generalized weakness. Patient states he has body aches and pains, fever of 100.9, positive nausea, positive vomiting times one in the emergency room continuing from multiple episodes this morning. Positive urinary urgency and frequency to go the bathroom is also noted. Positive cough nonproductive, denies chest pain denies shortness of breath denies dizziness at the current time. Patient did receive his flu shot this year. Lives with his wife in assisted living. Adriano Galloway MD  [facility]  Past Surgical History:  1953: HX AMPUTATION; Left      Comment:  arm, from polio, phantom pain  No date: HX APPENDECTOMY  2008?: HX COLONOSCOPY  2006: HX CORONARY STENT PLACEMENT  2004?: HX CORONARY STENT PLACEMENT  No date: HX HERNIA REPAIR; Left  No date: HX TONSILLECTOMY  No date: HX VEIN STRIPPING               Past Medical History:   Diagnosis Date    Amputation of arm ([address] 1953    left arm    Benign essential HTN     BPH (benign prostatic hyperplasia)     saw urology in [place] CAD in native artery     s/p stent x2. saw Dr. Nahid Cho in [place]    Diverticulitis 2013    DNR (do not resuscitate) 4/17/2018    H/O cardiovascular stress test fall 2015    Hypercholesterolemia     Insomnia     Melanoma ([address]     x4. 2013. 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 He still drives. weakness, R deltoid weakness, s/p L arm amputation, R hip pain, R leg-ankle brace    Renal cyst     8 cm, increasing 20 years    Right hip pain chronic, polio    Thrombocytopenia ([facility])     chronic    Venous insufficiency     s/p venous stripping    Weakness of right leg     Zoster 1982       Past Surgical History:   Procedure Laterality Date    HX AMPUTATION Left 1953    arm, from polio    HX APPENDECTOMY      HX COLONOSCOPY  2008?  HX CORONARY STENT PLACEMENT  2006    HX CORONARY STENT PLACEMENT  2004?  HX HERNIA REPAIR Left     HX TONSILLECTOMY      HX VEIN STRIPPING           Family History:   Problem Relation Age of Onset    Diabetes Mother        Social History     Socioeconomic History    Marital status:      Spouse name: Edwin Wiley Number of children: 2    Years of education: Not on file    Highest education level: Not on file   Social Needs    Financial resource strain: Not on file    Food insecurity - worry: Not on file    Food insecurity - inability: Not on file   [employer] needs - medical: Not on file   [employer] needs - non-medical: Not on file   Occupational History    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 Tobacco Use    Smoking status: Never Smoker    Smokeless tobacco: Never Used   Substance and Sexual Activity    Alcohol use: No    Drug use: Not on file    Sexual activity: Yes   Other Topics Concern    Not on file   Social History Narrative    Not on file         ALLERGIES: Patient has no known allergies. Review of Systems   Constitutional: Positive for activity change, chills, fatigue and fever. Negative for appetite change. HENT: Positive for congestion. Negative for ear discharge, ear pain, sinus pressure, sinus pain, sore throat and trouble swallowing. Eyes: Negative for photophobia, pain, redness, itching and visual disturbance. Respiratory: Positive for cough and shortness of breath. Negative for chest tightness. Cardiovascular: Negative for chest pain and palpitations. Gastrointestinal: Positive for nausea and vomiting.  Negative for abdominal distention and abdominal pain. Endocrine: Negative. Genitourinary: Positive for frequency and urgency. Negative for difficulty urinating. Musculoskeletal: Negative for back pain, neck pain and neck stiffness. Skin: Negative for color change, pallor, rash and wound. Allergic/Immunologic: Negative. Neurological: Negative for dizziness, syncope, weakness and headaches. Hematological: Does not bruise/bleed easily. Psychiatric/Behavioral: Negative for behavioral problems. The patient is not nervous/anxious. Vitals:    03/16/19 1244 03/16/19 1245 03/16/19 1247   BP: 175/85     Pulse: 92  92   Resp: 24  28   Temp: 99.9 °F (37.7 °C)     SpO2: (!) 87% 93% 90%   Weight: 95.3 kg (210 lb)     Height: 6' 1\" (1.854 m)              Physical Exam   Constitutional: He is oriented to person, place, and time. He appears well-developed and well-nourished. No distress. Acutely ill appearing male   HENT:   Head: Normocephalic and atraumatic. Right Ear: External ear normal.   Left Ear: External ear normal.   Nose: Nose normal.   Mouth/Throat: Oropharynx is clear and moist.   Eyes: Conjunctivae and EOM are normal. Pupils are equal, round, and reactive to light. Right eye exhibits no discharge. Left eye exhibits no discharge. Neck: Normal range of motion. Neck supple. No JVD present. No tracheal deviation present. Cardiovascular: Normal rate, regular rhythm, normal heart sounds and intact distal pulses. Exam reveals no gallop. No murmur heard. Pulmonary/Chest: Effort normal. No respiratory distress. He has no wheezes. He has rales (bases bilaterally). He exhibits no tenderness. Abdominal: Soft. Bowel sounds are normal. He exhibits no distension. There is no tenderness. There is no rebound and no guarding. Genitourinary:   Genitourinary Comments: Stated urinary urgency and frequency     Musculoskeletal: Normal range of motion. He exhibits tenderness (general malaise). He exhibits no edema. Neurological: He is alert and oriented to person, place, and time. Skin: Skin is warm and dry. No rash noted. No erythema. No pallor. Psychiatric: He has a normal mood and affect. His behavior is normal. Judgment and thought content normal.   Nursing note and vitals reviewed. MDM  Number of Diagnoses or Management Options  Hypoxia: new and requires workup  Influenza A: new and requires workup  Urinary tract infection with hematuria, site unspecified: new and requires workup  Diagnosis management comments: Plan:  Admit to the hospital for further evaluation and treatment. Amount and/or Complexity of Data Reviewed  Clinical lab tests: ordered and reviewed  Tests in the radiology section of CPT®: ordered and reviewed  Discuss the patient with other providers: yes (Discussed plan of care with Dr. Yousif Head)           Procedures    ED EKG interpretation:  Time: 12:40  Rhythm: normal sinus rhythm; Rate (approx.): 91 bpm; ST/T wave: Nonspecific T wave abnormality in lateral leads. ; possible left atrial enlargement; Note written by Lucila Chavez, as dictated by Francesca Hampton MD 12:59 PM    CONSULT NOTE:  2:24 PM Kunal Jeffrey MD spoke with Dr. Robert Weeks MD, Consult for Hospitalist.  Discussed available diagnostic tests and clinical findings. Dr. Charleen Tripp will evaluate the patient for admission to the hospital.    2:24 PM  Patient is being admitted to the hospital.  The results of their tests and reasons for their admission have been discussed with them and/or available family. They convey agreement and understanding for the need to be admitted and for their admission diagnosis.

## 2019-03-16 NOTE — H&P
[facility]    Hospitalist Admission Note      NAME:  Ney Hurd   :   1940   MRN:  [id number]     PCP:  Sandy Harris MD     Date/Time:  3/16/2019 3:37 PM          Subjective:     CHIEF COMPLAINT: weakness     HISTORY OF PRESENT ILLNESS:     Mr. Crys Andrews is a [de-identified] y.o.  male who presented to the Emergency Department complaining of weakness. It started 2 days ago. He always has some weakness lifelong from polio as child. Worsened 48 hr ago. Also with nausea, vomiting, fever, chills, cough, congestion. ER finds UTI and he meets sepsis criteria. Past Medical History:   Diagnosis Date    Benign essential HTN     BPH (benign prostatic hyperplasia)     saw urology in [place] CAD in native artery     s/p stent x2. saw Dr. Prudence Rene in [facility] MD [place]    DNR (do not resuscitate) 2018    Hx of diverticulitis of colon     Hypercholesterolemia     [address]an[address] [address]     x4. 2013. 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 He still drives. weakness, R deltoid weakness, s/p L arm amputation, R hip pain, R leg-ankle brace    Renal cyst     8 cm, increasing 20 years    Right hip pain     chronic, polio    Shingles     Thrombocytopenia (HCC)     chronic    Venous insufficiency     s/p venous stripping    Weakness of right leg     Zoster 1982        Past Surgical History:   Procedure Laterality Date    HX AMPUTATION Left     arm, from polio, phantom pain    HX APPENDECTOMY      HX COLONOSCOPY  ?  HX CORONARY STENT PLACEMENT      HX CORONARY STENT PLACEMENT  ?     HX HERNIA REPAIR Left     HX TONSILLECTOMY      HX VEIN STRIPPING         Social History     Tobacco Use    Smoking status: Never Smoker    Smokeless tobacco: Never Used   Substance Use Topics    Alcohol use: No        Family History   Problem Relation Age of Onset    Diabetes Mother       No Known Allergies     Prior to Admission medications    Medication Sig Start Date End Date Taking? Authorizing Provider   metoprolol succinate (TOPROL-XL) 25 mg XL tablet Take 12.5 mg by mouth daily. Yes Provider, Historical   OTHER,NON-FORMULARY, Bausch and Lomb dry eye 1 gtt both eyes night   Yes Provider, Historical   acetaminophen-codeine (TYLENOL #3) 300-30 mg per tablet TAKE 1 TABLET BY MOUTH EVERY 6 HOURS AS NEEDED FOR PAIN 1/28/19  Yes Fabián Chavarria MD   simvastatin (ZOCOR) 40 mg tablet TAKE 1 TABLET BY MOUTH NIGHTLY 10/2/18  Yes Refugio Prince MD   tamsulosin (FLOMAX) 0.4 mg capsule Take 1 Cap by mouth daily. 7/24/17  Yes Fabián Chvaarria MD   zolpidem (AMBIEN) 10 mg tablet Take 5 mg by mouth nightly as needed for Sleep. Yes Provider, Historical   MULTIVIT &MINERALS/FERROUS FUM (MULTI VITAMIN PO) Take  by mouth. Yes Provider, Historical   ASCORBATE CALCIUM (VITAMIN C PO) Take  by mouth.    Yes Provider, Historical       Review of Systems:  (bold if positive, if negative)    Gen:   fever, chills, fatigueEyes:  ENT:  rhinorrheaCVS:  Pulm:  Cough, dyspnea, sputumGI:  nausea, emesis  :    MS:  Skin:  Psych:  Endo:    Hem:  Renal:  Change in urine  Neuro:  weakness      Objective:      VITALS:    Vital signs reviewed; most recent are:    Visit Vitals  /80 (BP 1 Location: Right arm, BP Patient Position: At rest)   Pulse 87   Temp 98.2 °F (36.8 °C)   Resp 20   Ht 6' 1\" (1.854 m)   Wt 95.3 kg (210 lb)   SpO2 95%   BMI 27.71 kg/m²     SpO2 Readings from Last 6 Encounters:   03/16/19 95%   04/27/18 94%   04/17/18 95%   07/24/17 95%   11/03/16 97%    O2 Flow Rate (L/min): 3 l/min   No intake or output data in the 24 hours ending 03/16/19 1237     Exam:     Physical Exam:    Gen:  Well-developed, well-nourished, in no acute distress  HEENT:  Pink conjunctivae, PERRL, hearing intact to voice, moist mucous membranes  Neck:  Supple, without masses, thyroid non-tender  Resp:  No accessory muscle use, clear breath sounds without wheezes rales or rhonchi  Card:  No murmurs, normal S1, S2 without thrills, bruits or peripheral edema  Abd:  Soft, non-tender, non-distended, normoactive bowel sounds are present, no mass  Lymph:  No cervical or inguinal adenopathy  Musc:  No cyanosis or clubbing, L arm amputation  Skin:  No rashes or ulcers, skin turgor is reduced  Neuro:  Cranial nerves are grossly intact, general motor weakness, follows commands   Psych:  Good insight, oriented to person, place and time, alert     Labs:    Recent Labs     03/16/19  1334   WBC 9.5   HGB 14.6   HCT 44.8   PLT 73*     Recent Labs     03/16/19  1249   *   K 3.7      CO2 21   *   BUN 12   CREA 0.92   CA 8.3*   ALB 3.6   TBILI 0.7   SGOT 32   ALT 24     No results found for: GLUCPOC  No results for input(s): PH, PCO2, PO2, HCO3, FIO2 in the last 72 hours. No results for input(s): INR in the last 72 hours. No lab exists for component: INREXT  All Micro Results     Procedure Component Value Units Date/Time    INFLUENZA A & B AG (RAPID TEST) [[id number]]  (Abnormal) Collected:  03/16/19 1424    Order Status:  Completed Specimen:  Nasopharyngeal from Nasal washing Updated:  03/16/19 1506     Influenza A Antigen POSITIVE        Influenza B Antigen NEGATIVE        RESPIRATORY PANEL,PCR,NASOPHARYNGEAL [[id number]] Collected:  03/16/19 1442    Order Status:  Completed Specimen:  NASOPHARYNGEAL SWAB Updated:  03/16/19 1445    CULTURE, URINE [[id number]]     Order Status:  Sent Specimen:  Urine from Clean catch     URINE CULTURE HOLD SAMPLE [[id number]] Collected:  03/16/19 1328    Order Status:  Completed Specimen:  Urine from Serum Updated:  03/16/19 1335     Urine culture hold       URINE ON HOLD IN MICROBIOLOGY DEPT FOR 3 DAYS. IF UNPRESERVED URINE IS SUBMITTED, IT CANNOT BE USED FOR ADDITIONAL TESTING AFTER 24 HRS, RECOLLECTION WILL BE REQUIRED.           CULTURE, BLOOD [[id number]] Collected:  03/16/19 1320    Order Status:  Completed Specimen:  Blood Updated:  03/16/19 1334    CULTURE, BLOOD [[id number]] Collected:  03/16/19 1249    Order Status:  Completed Specimen:  Blood Updated:  03/16/19 1307    CULTURE, BLOOD [[id number]]     Order Status:  Canceled Specimen:  Blood     CULTURE, BLOOD [[id number]]     Order Status:  Canceled Specimen:  Blood           I have reviewed previous records       Assessment and Plan:      UTI (urinary tract infection) - POA based on sepsis and UA. Await cx. Ceftriaxone for now. Influenza A - POA based on sepsis and rapid testing. Unusual to have to simultaneous infections. Tamiflu. Droplet precautions. Sepsis / Bandemia / Fever chills - POA, due to UTI and/or Flu. IVF, IV Abx, tamiflu. NOT severe sepsis. Lactic acid normal.    Hypoxia - POA, new, unclear etiology, likely combination of flu and hypoventilation related to acute and chronic weakness. Oxygen. Check 6 minute walk. Dehydration / Hyponatremia - POA, due to poor PO intake and N/V. Hydrate and follow labs. CAD in native artery / Hypercholesterolemia - He is on metoprolol and statin, but not ASA. Appears stable. BPH (benign prostatic hyperplasia) / Incontinence - Check PVT. Continue flomax    Phantom pain - Tylneol prn    Post-polio syndrome - Supportive care. Fall precautions. Pt/OT eval.     Insomnia - Ambien prn    Thrombocytopenia - Chronic and stable.   This may be why he doesn't take ASA     Telemetry reviewed:   normal sinus rhythm    Risk of deterioration: high      Total time spent with patient: [address] discussed with: Patient, Family, Nursing Staff and >50% of time spent in counseling and coordination of care    Discussed:  Code Status, Care Plan and D/C Planning       ___________________________________________________    Attending Physician: Eddie Rodriguez MD

## 2019-03-16 NOTE — TELEPHONE ENCOUNTER
Guille Micheln his daughter calls noting he had the flu last week and had a fall 2 days ago and spent part of evening on the floor unable to get up or get help-now he is coughing and more fatigued and having worsening weakness and ?  Slurred speech-referred to er for evaluation

## 2019-03-17 VITALS
BODY MASS INDEX: 27.83 KG/M2 | HEART RATE: 80 BPM | WEIGHT: 210 LBS | RESPIRATION RATE: 18 BRPM | SYSTOLIC BLOOD PRESSURE: 124 MMHG | OXYGEN SATURATION: 94 % | TEMPERATURE: 97.7 F | HEIGHT: 73 IN | DIASTOLIC BLOOD PRESSURE: 73 MMHG

## 2019-03-17 LAB
ANION GAP SERPL CALC-SCNC: 8 MMOL/L (ref 5–15)
B PERT DNA SPEC QL NAA+PROBE: NOT DETECTED
BUN SERPL-MCNC: 12 MG/DL (ref 6–20)
BUN/CREAT SERPL: 16 (ref 12–20)
C PNEUM DNA SPEC QL NAA+PROBE: NOT DETECTED
CALCIUM SERPL-MCNC: 7.8 MG/DL (ref 8.5–10.1)
CHLORIDE SERPL-SCNC: 107 MMOL/L (ref 97–108)
CO2 SERPL-SCNC: 22 MMOL/L (ref 21–32)
CREAT SERPL-MCNC: 0.77 MG/DL (ref 0.7–1.3)
ERYTHROCYTE [DISTWIDTH] IN BLOOD BY AUTOMATED COUNT: 15.4 % (ref 11.5–14.5)
FERRITIN SERPL-MCNC: 243 NG/ML (ref 26–388)
FLUAV H1 2009 PAND RNA SPEC QL NAA+PROBE: NOT DETECTED
FLUAV H1 RNA SPEC QL NAA+PROBE: NOT DETECTED
FLUAV H3 RNA SPEC QL NAA+PROBE: DETECTED
FLUAV SUBTYP SPEC NAA+PROBE: NOT DETECTED
FLUBV RNA SPEC QL NAA+PROBE: NOT DETECTED
FOLATE SERPL-MCNC: 37.8 NG/ML (ref 5–21)
GLUCOSE SERPL-MCNC: 95 MG/DL (ref 65–100)
HADV DNA SPEC QL NAA+PROBE: NOT DETECTED
HCOV 229E RNA SPEC QL NAA+PROBE: DETECTED
HCOV HKU1 RNA SPEC QL NAA+PROBE: NOT DETECTED
HCOV NL63 RNA SPEC QL NAA+PROBE: NOT DETECTED
HCOV OC43 RNA SPEC QL NAA+PROBE: NOT DETECTED
HCT VFR BLD AUTO: 42.1 % (ref 36.6–50.3)
HGB BLD-MCNC: 13.7 G/DL (ref 12.1–17)
HMPV RNA SPEC QL NAA+PROBE: NOT DETECTED
HPIV1 RNA SPEC QL NAA+PROBE: NOT DETECTED
HPIV2 RNA SPEC QL NAA+PROBE: NOT DETECTED
HPIV3 RNA SPEC QL NAA+PROBE: NOT DETECTED
HPIV4 RNA SPEC QL NAA+PROBE: NOT DETECTED
M PNEUMO DNA SPEC QL NAA+PROBE: NOT DETECTED
MAGNESIUM SERPL-MCNC: 2.3 MG/DL (ref 1.6–2.4)
MCH RBC QN AUTO: 31.3 PG (ref 26–34)
MCHC RBC AUTO-ENTMCNC: 32.5 G/DL (ref 30–36.5)
MCV RBC AUTO: 96.1 FL (ref 80–99)
NRBC # BLD: 0 K/UL (ref 0–0.01)
NRBC BLD-RTO: 0 PER 100 WBC
PLATELET # BLD AUTO: 73 K/UL (ref 150–400)
PMV BLD AUTO: 10.5 FL (ref 8.9–12.9)
POTASSIUM SERPL-SCNC: 3.5 MMOL/L (ref 3.5–5.1)
RBC # BLD AUTO: 4.38 M/UL (ref 4.1–5.7)
RSV RNA SPEC QL NAA+PROBE: NOT DETECTED
RV+EV RNA SPEC QL NAA+PROBE: NOT DETECTED
SODIUM SERPL-SCNC: 137 MMOL/L (ref 136–145)
VIT B12 SERPL-MCNC: 1935 PG/ML (ref 193–986)
WBC # BLD AUTO: 6.2 K/UL (ref 4.1–11.1)

## 2019-03-17 PROCEDURE — 94760 N-INVAS EAR/PLS OXIMETRY 1: CPT

## 2019-03-17 PROCEDURE — 85027 COMPLETE CBC AUTOMATED: CPT

## 2019-03-17 PROCEDURE — 74011250637 HC RX REV CODE- 250/637: Performed by: INTERNAL MEDICINE

## 2019-03-17 PROCEDURE — 80048 BASIC METABOLIC PNL TOTAL CA: CPT

## 2019-03-17 PROCEDURE — 36415 COLL VENOUS BLD VENIPUNCTURE: CPT

## 2019-03-17 PROCEDURE — 77010033678 HC OXYGEN DAILY

## 2019-03-17 PROCEDURE — 74011000258 HC RX REV CODE- 258: Performed by: INTERNAL MEDICINE

## 2019-03-17 PROCEDURE — 97162 PT EVAL MOD COMPLEX 30 MIN: CPT | Performed by: PHYSICAL THERAPIST

## 2019-03-17 PROCEDURE — 83735 ASSAY OF MAGNESIUM: CPT

## 2019-03-17 PROCEDURE — 97530 THERAPEUTIC ACTIVITIES: CPT | Performed by: PHYSICAL THERAPIST

## 2019-03-17 RX ORDER — CEFDINIR 300 MG/1
300 CAPSULE ORAL 2 TIMES DAILY
Qty: 12 CAP | Refills: 0 | Status: SHIPPED | OUTPATIENT
Start: 2019-03-17 | End: 2019-03-23

## 2019-03-17 RX ORDER — OSELTAMIVIR PHOSPHATE 75 MG/1
75 CAPSULE ORAL EVERY 12 HOURS
Qty: 8 CAP | Refills: 0 | Status: SHIPPED | OUTPATIENT
Start: 2019-03-17 | End: 2019-03-21

## 2019-03-17 RX ADMIN — DEXTROSE MONOHYDRATE AND SODIUM CHLORIDE 100 ML/HR: 5; .9 INJECTION, SOLUTION INTRAVENOUS at 02:50

## 2019-03-17 RX ADMIN — TAMSULOSIN HYDROCHLORIDE 0.4 MG: 0.4 CAPSULE ORAL at 09:18

## 2019-03-17 RX ADMIN — METOPROLOL SUCCINATE 12.5 MG: 25 TABLET, EXTENDED RELEASE ORAL at 09:18

## 2019-03-17 RX ADMIN — OSELTAMIVIR PHOSPHATE 75 MG: 75 CAPSULE ORAL at 09:16

## 2019-03-17 RX ADMIN — ACETAMINOPHEN 650 MG: 325 TABLET ORAL at 03:56

## 2019-03-17 NOTE — DISCHARGE SUMMARY
Physician Discharge Summary     Patient ID:  Richelle Crigler  [id number]  [de-identified] y.o.  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 saw Dr. Yvette Grove in [place]      Post-polio syndrome ()      Overview: weakness, R deltoid weakness, s/p L arm amputation, R hip pain, R       leg-ankle brace      BPH (benign prostatic hyperplasia) ()      Insomnia ()      Thrombocytopenia (HCC) ()      DNR (do not resuscitate) (4/17/2018)      UTI (urinary tract infection) (3/16/2019)      Bandemia (3/16/2019)      Hypoxia (3/16/2019)      Hyponatremia (3/16/2019)      Fever chills (3/16/2019)      Sepsis ([address] (3/16/2019)         Hospital Course:     Mr. Miguel Tinajero is a pleasant [de-identified] yo M with a hx of post-polio syndrome and ongoing weakness who presented to ED for ongoing weakness and found to have UTI and influenza A. He was started on tamiflu and ceftriaxone. He quickly improved over 24 hours and he was discharged on 5 days of tamiflu and 7 day total therapy of cefdinir. He was evaluated and cleared by PT prior to discharge    PCP: Traci Loya MD    Consults: None    Significant Diagnostic Studies: See Hospital Course    Discharged home in improved condition.     Discharge Exam:    Visit Vitals  /73 (BP 1 Location: Right arm, BP Patient Position: Sitting)   Pulse 80   Temp 97.7 °F (36.5 °C)   Resp 18   Ht 6' 1\" (1.854 m)   Wt 95.3 kg (210 lb)   SpO2 94%   BMI 27.71 kg/m²       Physical Exam:     Gen:  Well-developed, well-nourished, in no acute distress  HEENT:  Pink conjunctivae, PERRL, hearing intact to voice, moist mucous membranes  Neck:  Supple, without masses, thyroid non-tender  Resp:  No accessory muscle use, clear breath sounds without wheezes rales or rhonchi  Card:  No murmurs, normal S1, S2 without thrills, bruits or peripheral edema  Abd:  Soft, non-tender, non-distended, normoactive bowel sounds are present, no mass  Lymph:  No cervical or inguinal adenopathy  Musc:  No cyanosis or clubbing, L arm amputation  Skin:  No rashes or ulcers, skin turgor is reduced  Neuro:  Cranial nerves are grossly intact, general motor weakness, follows commands   Psych:  Good insight, oriented to person, place and time, alert         Disposition: home    Patient Instructions:   Current Discharge Medication List      START taking these medications    Details   oseltamivir (TAMIFLU) 75 mg capsule Take 1 Cap by mouth every twelve (12) hours for 4 days. Qty: 8 Cap, Refills: 0      cefdinir (OMNICEF) 300 mg capsule Take 1 Cap by mouth two (2) times a day for 6 days. Qty: 12 Cap, Refills: 0         CONTINUE these medications which have NOT CHANGED    Details   metoprolol succinate (TOPROL-XL) 25 mg XL tablet Take 12.5 mg by mouth daily. OTHER,NON-FORMULARY, Bausch and Lomb dry eye 1 gtt both eyes night      acetaminophen-codeine (TYLENOL #3) 300-30 mg per tablet TAKE 1 TABLET BY MOUTH EVERY 6 HOURS AS NEEDED FOR PAIN  Qty: 30 Tab, Refills: 0    Comments: Not to exceed 5 additional fills before 04/14/2019. Associated Diagnoses: Phantom pain (HCC)      simvastatin (ZOCOR) 40 mg tablet TAKE 1 TABLET BY MOUTH NIGHTLY  Qty: 90 Tab, Refills: 1    Associated Diagnoses: Hypercholesterolemia      tamsulosin (FLOMAX) 0.4 mg capsule Take 1 Cap by mouth daily. Qty: 90 Cap, Refills: 1      zolpidem (AMBIEN) 10 mg tablet Take 5 mg by mouth nightly as needed for Sleep. MULTIVIT &MINERALS/FERROUS FUM (MULTI VITAMIN PO) Take  by mouth. Associated Diagnoses: Weakness of right leg      ASCORBATE CALCIUM (VITAMIN C PO) Take  by mouth. Activity: Activity as tolerated  Diet: Resume previous diet  Wound Care: None needed    Follow-up with PCP in 1 week    Signed:  Margarita Villegas DO  3/17/2019  1:32 PM    Greater than 30 mins was spent in coordination, counseling, and execution of this patient's discharge

## 2019-03-17 NOTE — PROGRESS NOTES
Problem: Mobility Impaired (Adult and Pediatric)  Goal: *Acute Goals and Plan of Care (Insert Text)  physical Therapy EVALUATION/DISCHARGE  Patient: Christopher Crawford [de-identified][de-identified] y.o. male)  Date: 3/17/2019  Primary Diagnosis: UTI (urinary tract infection) [N39.0]       Precautions: fall, droplet     ASSESSMENT :  Based on the objective data described below, the patient presents with impaired balance, abnormal gait, generalized debility, spot weakness (primarily R shoulder & foot -PPS), amputation LUE, reduced activity tolerance. Patient requiring min A for supine-sit (to R; HOB elevated), contact sit-stand. Ambulatory with close contact for short distances. Gait characterized by wide base, reduced foot clearance, trunk sway. High risk for falls. Utilizes a 'stick' (hiking) prn + furniture in room, and a motorized chair for any distance. Resides with wife in an apt in [facility]. 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 He is near baseline & concur he has no immediate rehab needs at this time. Further skilled acute physical therapy is not indicated at this time. PLAN :  Discharge Recommendations: None  Further Equipment Recommendations for Discharge: none     SUBJECTIVE:   Patient stated I get around pretty good.     OBJECTIVE DATA SUMMARY:   HISTORY:    Past Medical History:   Diagnosis Date    Benign essential HTN     BPH (benign prostatic hyperplasia)     saw urology in [place] CAD in native artery     s/p stent x2. saw Dr. Sanaz Boyce in [facility] MD [place]    DNR (do not resuscitate) 4/17/2018    Hx of diverticulitis of colon     Hypercholesterolemia     Melanoma ([address]     x4. 2013. superficial per pt    Phantom pain ([address]     L arm    Post-polio syndrome age 4    dx 2004 in [place]. He still drives. weakness, R deltoid weakness, s/p L arm amputation, R hip pain, R leg-ankle brace    Renal cyst     8 cm, increasing 20 years    Right hip pain     chronic, polio    Shingles     Thrombocytopenia (HCC)     chronic    Venous insufficiency     s/p venous stripping    Weakness of right leg     Zoster 1982     Past Surgical History:   Procedure Laterality Date    HX AMPUTATION Left 1953    arm, from polio, phantom pain    HX APPENDECTOMY      HX COLONOSCOPY  2008?  HX CORONARY STENT PLACEMENT  2006    HX CORONARY STENT PLACEMENT  2004?  HX HERNIA REPAIR Left     HX TONSILLECTOMY      HX VEIN STRIPPING       Prior Level of Function/Home Situation: see above  Personal factors and/or comorbidities impacting plan of care:     Home Situation  Home Environment: [facility]  One/Two Story Residence: Other (Comment)(3 story)  Living Alone: No  Support Systems: Family member(s)  Patient Expects to be Discharged to[de-identified] Other (comment)(independent living)  Current DME Used/Available at Home: Cane, straight, Wheelchair, power    EXAMINATION/PRESENTATION/DECISION MAKING:   Critical Behavior:   axox4           Hearing:   Auditory  Auditory Impairment: None  Skin:  Mult seb keratosis  Edema: none noted  Range Of Motion:  AROM: Within functional limits   x R shoulder - 0 deltoid        PROM: (x AUNDREA)           Strength:     as above - generally decreased  No R deltoid; distally 3+                    Tone & Sensation:   Tone: Normal              Sensation: Intact               Coordination:  Coordination: Within functional limits  Vision:      Functional Mobility:  Bed Mobility:     Supine to Sit: Minimum assistance(HOB elevated - to R)     Scooting: Modified independent(limited due to loss of AUNDREA)  Transfers:   sit-stand contact                          Balance:   Sitting: Intact(neurologially )  Standing: Impaired  Standing - Static: Good  Standing - Dynamic : Fair  Ambulation/Gait Training:  Distance (ft): 30 Feet (ft)  Assistive Device: (none)  Ambulation - Level of Assistance: Contact guard assistance        Gait Abnormalities: Trunk sway increased;Decreased step clearance        Base of Support: Widened  Stance: Left decreased  Speed/Eloisa: Slow  Step Length: Right shortened;Left shortened                    Functional Measure:  Tinetti test:    Sitting Balance: 1  Arises: 1  Attempts to Rise: 1  Immediate Standing Balance: 0  Standing Balance: 1  Nudged: 2  Eyes Closed: 1  Turn 360 Degrees - Continuous/Discontinuous: 1  Turn 360 Degrees - Steady/Unsteady: 1  Sitting Down: 2  Balance Score: 11  Indication of Gait: 0  R Step Length/Height: 0  L Step Length/Height: 0  R Foot Clearance: 1  L Foot Clearance: 1  Step Symmetry: 1  Step Continuity: 1  Path: 1  Trunk: 0  Walking Time: 0  Gait Score: 5  Total Score: 16         Tinetti Tool Score Risk of Falls  <19 = High Fall Risk  19-24 = Moderate Fall Risk  25-28 = Low Fall Risk  Tinetti ME. Performance-Oriented Assessment of Mobility Problems in Elderly Patients. [facility] 66; [id number].  (Scoring Description: PT Bulletin Feb. 10, 1993)    Older adults: Deanna Foss et al, 2009; n = 1000 [facility], [place] elderly evaluated with ABC, LEVI, ADL, and IADL)  · Mean LEVI score for males aged 69-68 years = 26.21(3.40)  · Mean LEVI score for females age 69-68 years = 25.16(4.30)  · Mean LEVI score for males over 80 years = 23.29(6.02)  · Mean LEVI score for females over 80 years = 17.20(8.32)          Physical Therapy Evaluation Charge Determination   History Examination Presentation Decision-Making   HIGH Complexity :3+ comorbidities / personal factors will impact the outcome/ POC  HIGH Complexity : 4+ Standardized tests and measures addressing body structure, function, activity limitation and / or participation in recreation  MEDIUM Complexity : Evolving with changing characteristics  Other outcome measures tinetti  MEDIUM      Based on the above components, the patient evaluation is determined to be of the following complexity level: MEDIUM    Pain:    no complaints        Activity Tolerance:   O2 sats OK post activity  Please refer to the flowsheet for vital signs taken during this treatment. After treatment:   [x]   Patient left in no apparent distress sitting up in chair  []   Patient left in no apparent distress in bed  [x]   Call bell left within reach  [x]   Nursing notified  []   Caregiver present  []   Bed alarm activated    COMMUNICATION/EDUCATION:   Communication/Collaboration:  [x]   Fall prevention education was provided and the patient/caregiver indicated understanding. [x]   Patient/family have participated as able and agree with findings and recommendations. []   Patient is unable to participate in plan of care at this time.   Findings and recommendations were discussed with: Registered Nurse and Certified Nursing Assistant/Patient Care Technician    Thank you for this referral.  Blanca Cantu, PT   Time Calculation: 27 mins

## 2019-03-17 NOTE — PROGRESS NOTES
I have reviewed discharge instructions with the patient. The patient verbalized understanding. Discharge medications reviewed with patient and appropriate educational materials and side effects teaching were provided. Signed AVS placed in patient's chart.

## 2019-03-17 NOTE — PROGRESS NOTES
3/17/2019  12:20 PM    CM met with pt and provided IM2 letter. Pt signed copy. Placed copy of signed letter in pt's chart and gave pt copy of signed letter.     Delfin Mandujano,  Care Management

## 2019-03-18 ENCOUNTER — PATIENT OUTREACH (OUTPATIENT)
Dept: INTERNAL MEDICINE CLINIC | Age: 79
End: 2019-03-18

## 2019-03-18 LAB
ATRIAL RATE: 91 BPM
CALCULATED P AXIS, ECG09: 32 DEGREES
CALCULATED R AXIS, ECG10: -8 DEGREES
CALCULATED T AXIS, ECG11: 34 DEGREES
DIAGNOSIS, 93000: NORMAL
P-R INTERVAL, ECG05: 180 MS
Q-T INTERVAL, ECG07: 358 MS
QRS DURATION, ECG06: 102 MS
QTC CALCULATION (BEZET), ECG08: 440 MS
VENTRICULAR RATE, ECG03: 91 BPM

## 2019-03-18 NOTE — PROGRESS NOTES
Hospital Discharge Follow-Up      Date/Time:  3/18/2019 3:32 PM    Patient was admitted to [facility] on 3/16/19 and discharged on 3/17/19 for UTI & influenza. The physician discharge summary was available at the time of outreach. Patient was contacted within 2 business days of discharge. Top Challenges reviewed with the provider   Health maintenance needs to be updated/gaps in care  Declined f/u at this time         Method of communication with provider : staff message    Inpatient RRAT score: 5  Was this a readmission? no   Patient stated reason for the readmission: n/a    Nurse Navigator (NN) contacted the patient by telephone to perform post hospital discharge assessment. Verified name and  with patient as identifiers. Provided introduction to self, and explanation of the Nurse Navigator role. Reviewed discharge instructions and red flags with patient who verbalized understanding. Patient given an opportunity to ask questions and does not have any further questions or concerns at this time. The patient agrees to contact the PCP office for questions related to their healthcare. NN provided contact information for future reference. Disease Specific:   N/A    Summary of patient's top problems:  1. UTI - reports frequent urination. Denies pain, burning or blood in urine. Taking prescribed ABX & increasing hydration. 2. Influenza- remains weak. Resides at [facility] apartment with wife, declined [facility] services. Reports productive cough & wheeze, but improving. Denies N/V. Appetite good    Home Health orders at discharge: no  [address]: n/a  Date of initial visit: [address] ordered/company: n/a  Durable Medical Equipment received: n/a    Barriers to care?  Declined to make f/u at this time    Advance Care Planning:   Does patient have an Advance Directive:  reviewed and current     Medication(s):   New Medications at Discharge: oseltamivir (TAMIFLU) 75 , cefdinir (OMNICEF) 300 mg   Changed Medications at Discharge: no  Discontinued Medications at Discharge: no    Medication reconciliation was performed with patient, who verbalizes understanding of administration of home medications. There were no barriers to obtaining medications identified at this time. Referral to Pharm D needed: no     Current Outpatient Medications   Medication Sig    oseltamivir (TAMIFLU) 75 mg capsule Take 1 Cap by mouth every twelve (12) hours for 4 days.  cefdinir (OMNICEF) 300 mg capsule Take 1 Cap by mouth two (2) times a day for 6 days.  metoprolol succinate (TOPROL-XL) 25 mg XL tablet Take 12.5 mg by mouth daily.  OTHER,NON-FORMULARY, Bausch and Lomb dry eye 1 gtt both eyes night    acetaminophen-codeine (TYLENOL #3) 300-30 mg per tablet TAKE 1 TABLET BY MOUTH EVERY 6 HOURS AS NEEDED FOR PAIN    simvastatin (ZOCOR) 40 mg tablet TAKE 1 TABLET BY MOUTH NIGHTLY    tamsulosin (FLOMAX) 0.4 mg capsule Take 1 Cap by mouth daily.  zolpidem (AMBIEN) 10 mg tablet Take 5 mg by mouth nightly as needed for Sleep.  MULTIVIT &MINERALS/FERROUS FUM (MULTI VITAMIN PO) Take  by mouth.  ASCORBATE CALCIUM (VITAMIN C PO) Take  by mouth. No current facility-administered medications for this visit. There are no discontinued medications. [facility] follow up appointment(s): No future appointments. Non-[facility] follow up appointment(s): ?  [facility]:  information provided as a resource     Goals      Attends follow-up appointments as directed. 3/18/19 Declined NN to make f/u appt with PCP, pt preferred to call back.  TSB       Knowledge and adherence of prescribed medication (ie. action, side effects, missed dose, etc.).      3/18/19 taking prescribed ABX until completed - TSB

## 2019-03-20 ENCOUNTER — TELEPHONE (OUTPATIENT)
Dept: INTERNAL MEDICINE CLINIC | Age: 79
End: 2019-03-20

## 2019-03-20 LAB
BACTERIA SPEC CULT: ABNORMAL
CC UR VC: ABNORMAL
SERVICE CMNT-IMP: ABNORMAL

## 2019-03-20 NOTE — TELEPHONE ENCOUNTER
Patient missed a call from our office. He stated he was in the hospital (admitted) and need a follow-up appointment. Please schedule him for transition of care appointment with Dr. Maren Estrada. Thanks.     Best contact: [phone]

## 2019-03-22 LAB
BACTERIA SPEC CULT: NORMAL
BACTERIA SPEC CULT: NORMAL
SERVICE CMNT-IMP: NORMAL
SERVICE CMNT-IMP: NORMAL

## 2019-03-25 NOTE — TELEPHONE ENCOUNTER
----- Message from [id number] sent at 3/22/2019  5:20 PM EDT -----  Regarding: Dr. Tanisha Stoddard: [phone]  MyChart\"  Message     ----- Message from [address] [place] [address] [address], Generic sent at 3/21/2019 10:31 AM EDT -----    Appointment Request From: Shanita Brought    With Provider: Leonora Duran MD [-Primary Care Physician-]    Preferred Date Range: Any date 3/21/2019 or later    Preferred Times: Any    Reason: To address the following health maintenance concerns. Shingrix Vaccine Age 49>  Glaucoma Screening Q2y  Pneumococcal 65+ Years  Medicare Yearly Exam  Influenza Age 5 To Adult    Comments:  Mondays or Thursdays if possible to use [facility] transportation. Dr Gabriella Diaz nurse called to schedule a post hospital appointment, and was to call back, but I have not heard. No rush. Went to [facility] 3/16/19 diagnosed with influenza type A, plus urinary tract infection. Released 3/17/19. Resting at home, taking meds,  getting better, still rather weak. Overdue for check-up anyway. Would like to recover for a few more days before appointment. Maybe 3/28/19 or later?

## 2019-04-03 DIAGNOSIS — E78.00 HYPERCHOLESTEROLEMIA: ICD-10-CM

## 2019-04-03 RX ORDER — SIMVASTATIN 40 MG/1
TABLET, FILM COATED ORAL
Qty: 90 TAB | Refills: 1 | Status: SHIPPED | OUTPATIENT
Start: 2019-04-03 | End: 2019-10-05 | Stop reason: SDUPTHER

## 2019-04-04 ENCOUNTER — HOSPITAL ENCOUNTER (OUTPATIENT)
Dept: LAB | Age: 79
Discharge: HOME | End: 2019-04-04
Payer: MEDICARE

## 2019-04-04 ENCOUNTER — OFFICE VISIT (OUTPATIENT)
Dept: INTERNAL MEDICINE CLINIC | Age: 79
End: 2019-04-04

## 2019-04-04 VITALS
HEART RATE: 72 BPM | TEMPERATURE: 97.6 F | DIASTOLIC BLOOD PRESSURE: 78 MMHG | SYSTOLIC BLOOD PRESSURE: 127 MMHG | BODY MASS INDEX: 27.87 KG/M2 | WEIGHT: 210.25 LBS | RESPIRATION RATE: 18 BRPM | HEIGHT: 73 IN | OXYGEN SATURATION: 94 %

## 2019-04-04 DIAGNOSIS — G14 POST-POLIO SYNDROME: ICD-10-CM

## 2019-04-04 DIAGNOSIS — N39.0 URINARY TRACT INFECTION WITHOUT HEMATURIA, SITE UNSPECIFIED: ICD-10-CM

## 2019-04-04 DIAGNOSIS — Z87.09 HISTORY OF INFLUENZA: ICD-10-CM

## 2019-04-04 DIAGNOSIS — N40.0 BENIGN PROSTATIC HYPERPLASIA WITHOUT LOWER URINARY TRACT SYMPTOMS: ICD-10-CM

## 2019-04-04 DIAGNOSIS — E78.00 HYPERCHOLESTEROLEMIA: ICD-10-CM

## 2019-04-04 DIAGNOSIS — G47.09 OTHER INSOMNIA: ICD-10-CM

## 2019-04-04 DIAGNOSIS — G54.6 PHANTOM PAIN (HCC): ICD-10-CM

## 2019-04-04 DIAGNOSIS — Z00.00 MEDICARE ANNUAL WELLNESS VISIT, SUBSEQUENT: Primary | ICD-10-CM

## 2019-04-04 PROBLEM — Z86.69 HISTORY OF RETINAL DETACHMENT: Status: ACTIVE | Noted: 2017-08-28

## 2019-04-04 PROBLEM — H55.00 NYSTAGMUS: Status: ACTIVE | Noted: 2017-08-28

## 2019-04-04 PROBLEM — H40.013 OPEN ANGLE WITH BORDERLINE FINDINGS OF BOTH EYES: Status: ACTIVE | Noted: 2017-08-28

## 2019-04-04 LAB
BILIRUB UR QL STRIP: NEGATIVE
GLUCOSE UR-MCNC: NEGATIVE MG/DL
KETONES P FAST UR STRIP-MCNC: NEGATIVE MG/DL
PH UR STRIP: 5.5 [PH] (ref 4.6–8)
PROT UR QL STRIP: NEGATIVE
SP GR UR STRIP: 1.01 (ref 1–1.03)
UA UROBILINOGEN AMB POC: NORMAL (ref 0.2–1)
URINALYSIS CLARITY POC: NORMAL
URINALYSIS COLOR POC: YELLOW
URINE BLOOD POC: NEGATIVE
URINE LEUKOCYTES POC: NORMAL
URINE NITRITES POC: POSITIVE

## 2019-04-04 PROCEDURE — 87086 URINE CULTURE/COLONY COUNT: CPT

## 2019-04-04 PROCEDURE — 87088 URINE BACTERIA CULTURE: CPT

## 2019-04-04 PROCEDURE — 87186 SC STD MICRODIL/AGAR DIL: CPT

## 2019-04-04 PROCEDURE — 87077 CULTURE AEROBIC IDENTIFY: CPT

## 2019-04-04 RX ORDER — TAMSULOSIN HYDROCHLORIDE 0.4 MG/1
0.8 CAPSULE ORAL DAILY
Qty: 180 CAP | Refills: 1 | Status: SHIPPED | OUTPATIENT
Start: 2019-04-04 | End: 2019-10-02 | Stop reason: SDUPTHER

## 2019-04-04 RX ORDER — ZOSTER VACCINE RECOMBINANT, ADJUVANTED 50 MCG/0.5
0.5 KIT INTRAMUSCULAR ONCE
Qty: 0.5 ML | Refills: 1 | Status: SHIPPED | OUTPATIENT
Start: 2019-04-04 | End: 2019-04-04

## 2019-04-04 RX ORDER — PNEUMOCOCCAL 13-VALENT CONJUGATE VACCINE 2.2; 2.2; 2.2; 2.2; 2.2; 4.4; 2.2; 2.2; 2.2; 2.2; 2.2; 2.2; 2.2 UG/.5ML; UG/.5ML; UG/.5ML; UG/.5ML; UG/.5ML; UG/.5ML; UG/.5ML; UG/.5ML; UG/.5ML; UG/.5ML; UG/.5ML; UG/.5ML; UG/.5ML
0.5 INJECTION, SUSPENSION INTRAMUSCULAR ONCE
Qty: 1 SYRINGE | Refills: 0 | Status: SHIPPED | OUTPATIENT
Start: 2019-04-04 | End: 2019-04-04

## 2019-04-04 RX ORDER — ACETAMINOPHEN AND CODEINE PHOSPHATE 300; 30 MG/1; MG/1
1 TABLET ORAL
Qty: 30 TAB | Refills: 0 | Status: SHIPPED | OUTPATIENT
Start: 2019-04-04 | End: 2019-09-05 | Stop reason: SDUPTHER

## 2019-04-04 RX ORDER — ZOLPIDEM TARTRATE 10 MG/1
10 TABLET ORAL
Qty: 30 TAB | Refills: 1 | Status: ON HOLD | OUTPATIENT
Start: 2019-04-04 | End: 2019-10-25

## 2019-04-04 NOTE — PROGRESS NOTES
HISTORY OF PRESENT ILLNESS Chief Complaint Patient presents with  
[facility] Follow Up  
  f/u, discuss urinary issues Presents for hospital f/u Nurse Navigator call noted to patient and documented in [facility]. Hospital record and relevant lab results, test results and consult notes have personally been reviewed by me at this office visit. Medications reviewed and reconciled. Past 14 day window for BREANNE Admit date: 3/16/2019 Discharge date and time: 3/17/2019 Admission Diagnoses: UTI (urinary tract infection) [N39.0] Hospital Course: Mr. Alex Hinds is a pleasant [de-identified] yo M with a hx of post-polio syndrome and ongoing weakness who presented to ED for ongoing weakness and found to have UTI and influenza A. He was started on tamiflu and ceftriaxone. He quickly improved over 24 hours and he was discharged on 5 days of tamiflu and 7 day total therapy of cefdinir. He was evaluated and cleared by PT prior to discharge Urine culture 3/16 grew Klebsiella, R to macrobid, amp/sulbactam only. Consults: None Significant Diagnostic Studies: See Hospital Course Discharged home in improved condition. Today, reports mild residual cough and chest congestion. No SOB, fever, chills. Has mild dysuria. No flank pain. Hyperlipidemia ROS: taking medications as instructed, no medication side effects noted No new myalgias, no joint pains, no weakness No TIA's, no chest pain on exertion, no dyspnea on exertion, no swelling of ankles. Lab Results Component Value Date/Time Cholesterol, total 137 07/24/2017 11:58 AM  
 HDL Cholesterol 48 07/24/2017 11:58 AM  
 LDL, calculated 69 07/24/2017 11:58 AM  
 VLDL, calculated 20 07/24/2017 11:58 AM  
 Triglyceride 98 07/24/2017 11:58 AM  
 
 
Review of Systems All other systems reviewed and are negative, except as noted in HPI Past Medical and Surgical History 
 has a past medical history of Benign essential HTN, BPH (benign prostatic hyperplasia), CAD in native artery, DNR (do not resuscitate) (4/17/2018), diverticulitis of colon, Hypercholesterolemia, Melanoma ([address] Phantom pain ([address] Post-polio syndrome (age 4), Renal cyst, Right hip pain, Shingles, Thrombocytopenia ([facility] [address] Venous insufficiency, Weakness of right leg, and Zoster (1982). 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 reports that he has never smoked. He has never used smokeless tobacco. He reports that he does not drink alcohol. 
family history includes Diabetes in his mother. Physical Exam  
Nursing note and vitals reviewed. Blood pressure 127/78, pulse 72, temperature 97.6 °F (36.4 °C), temperature source Oral, resp. rate 18, height 6' 1\" (1.854 m), weight 210 lb 4 oz (95.4 kg), SpO2 94 %. Constitutional:  No distress. Eyes: Conjunctivae are normal.  
Ears:  Hearing grossly intact Cardiovascular: Normal rate. regular rhythm, no murmurs or gallops No edema Pulmonary/Chest: Effort normal.   CTAB Musculoskeletal: moves all 4 extremities Neurological: Alert and oriented to person, place, and time. Skin: No rash noted. Psychiatric: Normal mood and affect. Behavior is normal.  
 
ASSESSMENT and PLAN Diagnoses and all orders for this visit: 
 
2. Urinary tract infection without hematuria, site unspecified Currently asymptomatic Repeat culture. Consider referral 
-     AMB POC URINALYSIS DIP STICK AUTO W/O MICRO 
-     CULTURE, URINE 3. Benign prostatic hyperplasia without lower urinary tract symptoms 
based on my history, the overall control of this problem borderline controlled. Increase dose. Refer prn 
-     tamsulosin (FLOMAX) 0.4 mg capsule; Take 2 Caps by mouth daily. Increased 4/4/19 4. History of influenza Currently asymptomatic 5. Hypercholesterolemia Controlled on current regimen. Continue current medications as written in chart. Declined labs today. 6. Post-polio syndrome He is resilient with gait impairment, pain, L arm amputee 7. Phantom pain ([address] Controlled on current regimen. Continue current medications as written in chart. Rare use of t#3  profile was accessed online for Edna Robison and reviewed by me during this encounter. I did not see evidence of inappropriate or suspicious controlled substance prescription activity. -     acetaminophen-codeine (TYLENOL #3) 300-30 mg per tablet; Take 1 Tab by mouth every six (6) hours as needed for Pain for up to 30 days. Max Daily Amount: 4 Tabs. 8. Other insomnia 
-     zolpidem (AMBIEN) 10 mg tablet; Take 1 Tab by mouth nightly as needed for Sleep. Max Daily Amount: 10 mg. 
 
 
 
 
 
lab results and schedule of future lab studies reviewed with patient 
reviewed medications and side effects in detail Return to clinic for further evaluation if new symptoms develop Current Outpatient Medications Medication Sig  
 simvastatin (ZOCOR) 40 mg tablet TAKE 1 TABLET BY MOUTH AT BEDTIME  metoprolol succinate (TOPROL-XL) 25 mg XL tablet Take 12.5 mg by mouth daily.  OTHER,NON-FORMULARY, Bausch and Lomb dry eye 1 gtt both eyes night  acetaminophen-codeine (TYLENOL #3) 300-30 mg per tablet TAKE 1 TABLET BY MOUTH EVERY 6 HOURS AS NEEDED FOR PAIN  
 tamsulosin (FLOMAX) 0.4 mg capsule Take 1 Cap by mouth daily.  zolpidem (AMBIEN) 10 mg tablet Take 5 mg by mouth nightly as needed for Sleep.  MULTIVIT &MINERALS/FERROUS FUM (MULTI VITAMIN PO) Take  by mouth.  ASCORBATE CALCIUM (VITAMIN C PO) Take  by mouth. No current facility-administered medications for this visit. Discussed the patient's BMI with him. The BMI follow up plan is as follows:  
 
dietary management education, guidance, and counseling 
encourage exercise 
monitor weight prescribed dietary intake An After Visit Summary was printed and given to the patient.

## 2019-04-04 NOTE — PATIENT INSTRUCTIONS
Body Mass Index: Care Instructions Your Care Instructions Body mass index (BMI) can help you see if your weight is raising your risk for health problems. It uses a formula to compare how much you weigh with how tall you are. · A BMI lower than 18.5 is considered underweight. · A BMI between 18.5 and 24.9 is considered healthy. · A BMI between 25 and 29.9 is considered overweight. A BMI of 30 or higher is considered obese. If your BMI is in the normal range, it means that you have a lower risk for weight-related health problems. If your BMI is in the overweight or obese range, you may be at increased risk for weight-related health problems, such as high blood pressure, heart disease, stroke, arthritis or joint pain, and diabetes. If your BMI is in the underweight range, you may be at increased risk for health problems such as fatigue, lower protection (immunity) against illness, muscle loss, bone loss, hair loss, and hormone problems. BMI is just one measure of your risk for weight-related health problems. You may be at higher risk for health problems if you are not active, you eat an unhealthy diet, or you drink too much alcohol or use tobacco products. Follow-up care is a key part of your treatment and safety. Be sure to make and go to all appointments, and call your doctor if you are having problems. It's also a good idea to know your test results and keep a list of the medicines you take. How can you care for yourself at home? · Practice healthy eating habits. This includes eating plenty of fruits, vegetables, whole grains, lean protein, and low-fat dairy. · If your doctor recommends it, get more exercise. Walking is a good choice. Bit by bit, increase the amount you walk every day. Try for at least 30 minutes on most days of the week. · Do not smoke. Smoking can increase your risk for health problems.  If you need help quitting, talk to your doctor about stop-smoking programs and medicines. These can increase your chances of quitting for good. · Limit alcohol to 2 drinks a day for men and 1 drink a day for women. Too much alcohol can cause health problems. If you have a BMI higher than 25 · Your doctor may do other tests to check your risk for weight-related health problems. This may include measuring the distance around your waist. A waist measurement of more than 40 inches in men or 35 inches in women can increase the risk of weight-related health problems. · Talk with your doctor about steps you can take to stay healthy or improve your health. You may need to make lifestyle changes to lose weight and stay healthy, such as changing your diet and getting regular exercise. If you have a BMI lower than 18.5 · Your doctor may do other tests to check your risk for health problems. · Talk with your doctor about steps you can take to stay healthy or improve your health. You may need to make lifestyle changes to gain or maintain weight and stay healthy, such as getting more healthy foods in your diet and doing exercises to build muscle. Where can you learn more? Go to http://melanie-leandro.info/. Enter S176 in the search box to learn more about \"Body Mass Index: Care Instructions. \" Current as of: October 13, 2016 Content Version: 11.4 © 9777-8663 [employer]wise[employer]. Care instructions adapted under license by [employer] (which disclaims liability or warranty for this information). If you have questions about a medical condition or this instruction, always ask your healthcare professional. [address] any warranty or liability for your use of this information.

## 2019-04-04 NOTE — ACP (ADVANCE CARE PLANNING)
Advance Care Planning (ACP) Note    Date of ACP Conversation: 4/4/2019  Persons included in Conversation: patient  Length of ACP Conversation in minutes: <16 minutes (Non-Billable)    Conversation requested by:   Patient    Authorized Decision Maker (if patient is incapable of making informed decisions): This person is:  Healthcare Agent/Medical Power of  under Advance Directive    General ACP for ALL Patients with Decision Making Capacity: yes    Advance Directive Conversation with Patients who have not yet planned:  Importance of advance care planning, including choosing a healthcare agent to communicate patient's healthcare decisions if patient lost the ability to make decisions, such as after a sudden illness or accident  Explored patient's values, goals, and care preferences as related to these situations    Review of Existing Advance Directive: (Select questions covered)  Does this advance directive still reflect your preferences?   Yes    Interventions Provided:  Recommended review of completed ACP document annually or upon change in health status

## 2019-04-04 NOTE — PROGRESS NOTES
This is a Subsequent Medicare Annual Wellness Visit providing Personalized Prevention Plan Services (PPPS) (Performed 12 months after initial AWV and PPPS ) I have reviewed the patient's medical history in detail and updated the computerized patient record. History Past Medical History:  
Diagnosis Date  Benign essential HTN   
 BPH (benign prostatic hyperplasia)   
 saw urology in [place]  CAD in native artery s/p stent x2. saw Dr. Buster Isaac in [place]  
 DNR (do not resuscitate) 4/17/2018  Hx of diverticulitis of colon  Hypercholesterolemia  Melanoma ([address] x4. 2013. superficial per pt  Phantom pain ([address] L arm  Post-polio syndrome age 4  
 dx 2004 in [place]. He still drives. weakness, R deltoid weakness, s/p L arm amputation, R hip pain, R leg-ankle brace  Renal cyst   
 8 cm, increasing 20 years  Right hip pain   
 chronic, polio  Shingles  Thrombocytopenia ([address] chronic  Venous insufficiency s/p venous stripping  Weakness of right leg  Zoster 1982 Past Surgical History:  
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  
 arm, from polio, phantom pain  HX APPENDECTOMY  HX COLONOSCOPY  2008?  HX CORONARY STENT PLACEMENT  2006  HX CORONARY STENT PLACEMENT  2004?  HX HERNIA REPAIR Left  HX TONSILLECTOMY  HX VEIN STRIPPING Current Outpatient Medications Medication Sig  
 PREVNAR 13, PF, 0.5 mL syrg injection 0.5 mL by IntraMUSCular route once for 1 dose. PLEASE HAVE THIS AT YOUR LOCAL PHARMACY  Indications: Prevention of Streptococcus Pneumoniae Infection  SHINGRIX, PF, 50 mcg/0.5 mL susr injection 0.5 mL by IntraMUSCular route once for 1 dose. Receive 2nd dose in 2-6 months. For Shingles (Zoster) prevention  tamsulosin (FLOMAX) 0.4 mg capsule Take 2 Caps by mouth daily. Increased 4/4/19  simvastatin (ZOCOR) 40 mg tablet TAKE 1 TABLET BY MOUTH AT BEDTIME  
  metoprolol succinate (TOPROL-XL) 25 mg XL tablet Take 12.5 mg by mouth daily.  OTHER,NON-FORMULARY, Bausch and Lomb dry eye 1 gtt both eyes night  acetaminophen-codeine (TYLENOL #3) 300-30 mg per tablet TAKE 1 TABLET BY MOUTH EVERY 6 HOURS AS NEEDED FOR PAIN  
 zolpidem (AMBIEN) 10 mg tablet Take 5 mg by mouth nightly as needed for Sleep.  MULTIVIT &MINERALS/FERROUS FUM (MULTI VITAMIN PO) Take  by mouth.  ASCORBATE CALCIUM (VITAMIN C PO) Take  by mouth. No current facility-administered medications for this visit. No Known Allergies Family History Problem Relation Age of Onset  Diabetes Mother   
 
 
 reports that he has never smoked. He has never used smokeless tobacco. 
 reports that he does not drink alcohol. Depression Risk Factor Screening:  
 
 
Alcohol Risk Factor Screening: On any occasion during the past 3 months, have you had more than 3 drinks containing alcohol? No 
 
Do you average more than 14 drinks per week? No 
 
 
Functional Ability and Level of Safety:  
 
Hearing Loss  
mild Activities of Daily Living Self-care. Requires assistance with: no ADLs Fall Risk Fall Risk Assessment, last 12 mths 4/4/2019 Able to walk? Yes Fall in past 12 months? Yes Fall with injury? Yes  
Number of falls in past 12 months 1 Fall Risk Score 2 Abuse Screen Patient is not abused Review of Systems A comprehensive review of systems was negative except for that written in the HPI. Physical Examination Evaluation of Cognitive Function: 
Mood/affect:  neutral, happy Appearance: age appropriate Family member/caregiver input: none Blood pressure 127/78, pulse 72, temperature 97.6 °F (36.4 °C), temperature source Oral, resp. rate 18, height 6' 1\" (1.854 m), weight 210 lb 4 oz (95.4 kg), SpO2 94 %. General appearance: alert, cooperative, no distress, appears stated age Neck: supple, symmetrical, trachea midline, no adenopathy, thyroid: not enlarged, symmetric, no tenderness/mass/nodules, no carotid bruit and no JVD Lungs: clear to auscultation bilaterally Heart: regular rate and rhythm, S1, S2 normal, no murmur, click, rub or gallop Extremities: extremities normal, atraumatic, no cyanosis or edema Patient Care Team: 
Jackelyn Rizo MD as PCP - General (Internal Medicine) Karishma Gan RN as Ambulatory Care Navigator (Internal Medicine) Advice/Referrals/Counseling Education and counseling provided. See below for specific orders Diagnoses and all orders for this visit: 
 
1. Medicare annual wellness visit, subsequent Saw optho 2017 No indication for PSA, colon screening 
-     PREVNAR 13, PF, 0.5 mL syrg injection; 0.5 mL by IntraMUSCular route once for 1 dose. PLEASE HAVE THIS AT YOUR LOCAL PHARMACY  Indications: Prevention of Streptococcus Pneumoniae Infection 
-     SHINGRIX, PF, 50 mcg/0.5 mL susr injection; 0.5 mL by IntraMUSCular route once for 1 dose. Receive 2nd dose in 2-6 months. For Shingles (Zoster) prevention Potential medication side effects were discussed with the patient; let me know if any occur. Return for yearly Annual Wellness Visits

## 2019-04-06 LAB — BACTERIA UR CULT: ABNORMAL

## 2019-04-07 DIAGNOSIS — N40.0 BENIGN PROSTATIC HYPERPLASIA WITHOUT LOWER URINARY TRACT SYMPTOMS: ICD-10-CM

## 2019-04-07 RX ORDER — CIPROFLOXACIN 500 MG/1
500 TABLET ORAL 2 TIMES DAILY
Qty: 14 TAB | Refills: 0 | Status: SHIPPED | OUTPATIENT
Start: 2019-04-07 | End: 2019-04-10 | Stop reason: SDUPTHER

## 2019-04-08 NOTE — PROGRESS NOTES
Ryder Wong - let him know I sent in cipro for a UTI. This should hopefully cure it. Return to clinic for further evaluation if new symptoms develop or if current symptoms worsen or fail to resolve. Please call in medication as written in connect care. Thanks.

## 2019-04-10 ENCOUNTER — TELEPHONE (OUTPATIENT)
Dept: INTERNAL MEDICINE CLINIC | Age: 79
End: 2019-04-10

## 2019-04-10 RX ORDER — CIPROFLOXACIN 500 MG/1
500 TABLET ORAL 2 TIMES DAILY
Qty: 14 TAB | Refills: 0 | Status: SHIPPED | OUTPATIENT
Start: 2019-04-10 | End: 2019-04-17

## 2019-04-10 NOTE — TELEPHONE ENCOUNTER
Patient asking for nurse to call [facility] pharmacist to confirm request for Cipro as the pharmacy has not received script. He would like to have his medication tomorrow. Thanks.     [facility]: [phone]  Patient: [phone]

## 2019-04-11 ENCOUNTER — TELEPHONE (OUTPATIENT)
Dept: INTERNAL MEDICINE CLINIC | Age: 79
End: 2019-04-11

## 2019-04-11 NOTE — TELEPHONE ENCOUNTER
Patient called very upset about medication not being at pharmacy. Patient states that this has happened several times in past w/ sending through computer. Patient is requesting that this be called in as soon as possible as he has been trying since 4/7/19 to get this filled. Patient also request that nurse call him to let him know its been taken care of.      Cipro 500 mg 1 tab twice a day for 7 days      [id number] [address], [address] [phone]      PT Contact# [phone]

## 2019-05-19 RX ORDER — SILDENAFIL 100 MG/1
100 TABLET, FILM COATED ORAL
Qty: 30 TAB | Refills: 2 | Status: SHIPPED | OUTPATIENT
Start: 2019-05-19 | End: 2019-09-05 | Stop reason: ALTCHOICE

## 2019-06-14 RX ORDER — METOPROLOL SUCCINATE 25 MG/1
TABLET, EXTENDED RELEASE ORAL
Qty: 90 TAB | Refills: 1 | Status: ON HOLD | OUTPATIENT
Start: 2019-06-14 | End: 2019-10-25

## 2019-09-05 ENCOUNTER — OFFICE VISIT (OUTPATIENT)
Dept: INTERNAL MEDICINE CLINIC | Age: 79
End: 2019-09-05

## 2019-09-05 VITALS
TEMPERATURE: 97.3 F | HEIGHT: 73 IN | WEIGHT: 212 LBS | DIASTOLIC BLOOD PRESSURE: 71 MMHG | RESPIRATION RATE: 18 BRPM | SYSTOLIC BLOOD PRESSURE: 110 MMHG | OXYGEN SATURATION: 94 % | BODY MASS INDEX: 28.1 KG/M2 | HEART RATE: 88 BPM

## 2019-09-05 DIAGNOSIS — Z87.898 HISTORY OF ABDOMINAL PAIN: ICD-10-CM

## 2019-09-05 DIAGNOSIS — I10 BENIGN ESSENTIAL HTN: ICD-10-CM

## 2019-09-05 DIAGNOSIS — L03.119 CELLULITIS OF SHOULDER: Primary | ICD-10-CM

## 2019-09-05 DIAGNOSIS — G54.6 PHANTOM PAIN (HCC): ICD-10-CM

## 2019-09-05 RX ORDER — AMOXICILLIN AND CLAVULANATE POTASSIUM 875; 125 MG/1; MG/1
1 TABLET, FILM COATED ORAL 2 TIMES DAILY
Qty: 20 TAB | Refills: 0 | Status: SHIPPED | OUTPATIENT
Start: 2019-09-05 | End: 2019-09-17 | Stop reason: ALTCHOICE

## 2019-09-05 RX ORDER — AMOXICILLIN AND CLAVULANATE POTASSIUM 875; 125 MG/1; MG/1
1 TABLET, FILM COATED ORAL 2 TIMES DAILY
Qty: 20 TAB | Refills: 0 | Status: SHIPPED | OUTPATIENT
Start: 2019-09-05 | End: 2019-09-05 | Stop reason: SDUPTHER

## 2019-09-05 RX ORDER — ACETAMINOPHEN AND CODEINE PHOSPHATE 300; 30 MG/1; MG/1
1 TABLET ORAL
Qty: 30 TAB | Refills: 0 | Status: SHIPPED | OUTPATIENT
Start: 2019-09-05 | End: 2019-11-11 | Stop reason: SDUPTHER

## 2019-09-05 NOTE — PROGRESS NOTES
HISTORY OF PRESENT ILLNESS  Alexis Soto is a 78 y.o. male. HPI   Reports redness and mild discomfort of his left arm stump of the shoulder. Is been ongoing for several months. Has been applying topical antibiotic therapy without improvement. It is not draining. No fever or chills. No injuries or falls in this region. Also reports some lower abdominal discomfort for the past month or 2. He decided to stop wearing suspenders and his symptoms improved a little bit, but still has some mild discomfort there. He has a distant history of diverticulitis and was wondering about that. Denies any changes in his bowel habits or bladder habits. No blood in stool. Hypertension  Hypertension ROS: taking medications as instructed, no medication side effects noted, no TIA's, no chest pain on exertion, no dyspnea on exertion, no swelling of ankles     reports that he has never smoked. He has never used smokeless tobacco.    reports that he does not drink alcohol. BP Readings from Last 2 Encounters:   09/05/19 110/71   04/04/19 127/78     Review of Systems   Constitutional: Negative for chills, fever, malaise/fatigue and weight loss. HENT: Negative for hearing loss and tinnitus. Eyes: Negative for blurred vision. Respiratory: Negative for cough and shortness of breath. Cardiovascular: Negative for chest pain, palpitations and leg swelling. Gastrointestinal: Positive for abdominal pain. Negative for blood in stool, constipation, diarrhea, heartburn, melena, nausea and vomiting. Musculoskeletal: Negative for back pain. Skin: Negative for rash. Neurological: Negative for dizziness, weakness and headaches. Psychiatric/Behavioral: Negative for depression. The patient does not have insomnia. All other systems reviewed and are negative. Physical Exam   Constitutional: He is oriented to person, place, and time. He appears well-developed and well-nourished. No distress. Eyes: No scleral icterus. Neck: No JVD present. Cardiovascular: Normal rate and regular rhythm. No murmur heard. Pulmonary/Chest: Effort normal and breath sounds normal.   Abdominal: Soft. Bowel sounds are normal. He exhibits no distension, no abdominal bruit, no ascites and no mass. There is tenderness. There is no rebound and no guarding. No hernia. Very mild tenderness. Musculoskeletal: He exhibits no edema. Neurological: He is alert and oriented to person, place, and time. Skin: No rash noted. Absence of left arm. At the stump of his shoulder, there is a 4 cm erythematous region which is mildly warm. No fluctuance and no drainage. Psychiatric: He has a normal mood and affect. His behavior is normal. Judgment and thought content normal.   Nursing note and vitals reviewed. ASSESSMENT and PLAN  Diagnoses and all orders for this visit:    1. Cellulitis of shoulder  Location of his stump. It is relatively mild. Has failed topical antibiotic therapy. This may not need to be treated, will try Augmentin to see if it helps. Consider Keflex as next option.  -     amoxicillin-clavulanate (AUGMENTIN) 875-125 mg per tablet; Take 1 Tab by mouth two (2) times a day. 2. Phantom pain ([address]  Controlled on current regimen. Continue current medications as written in chart  -     acetaminophen-codeine (TYLENOL #3) 300-30 mg per tablet; Take 1 Tab by mouth every six (6) hours as needed for Pain for up to 30 days. Max Daily Amount: 4 Tabs. 3. History of abdominal pain  Since he is asymptomatic. Suspect this is musculoskeletal/abdominal wall. It is possible he could have a mild case of diverticulitis. Will give Augmentin for wound infection as above. Return to clinic if not improving.  -     amoxicillin-clavulanate (AUGMENTIN) 875-125 mg per tablet; Take 1 Tab by mouth two (2) times a day. 4. Benign essential HTN  Controlled on current regimen.   Continue current medications as written in chart

## 2019-09-17 RX ORDER — CEPHALEXIN 500 MG/1
500 CAPSULE ORAL 4 TIMES DAILY
Qty: 40 CAP | Refills: 0 | Status: ON HOLD | OUTPATIENT
Start: 2019-09-17 | End: 2019-10-25

## 2019-10-02 DIAGNOSIS — N40.0 BENIGN PROSTATIC HYPERPLASIA WITHOUT LOWER URINARY TRACT SYMPTOMS: ICD-10-CM

## 2019-10-02 RX ORDER — TAMSULOSIN HYDROCHLORIDE 0.4 MG/1
0.8 CAPSULE ORAL DAILY
Qty: 180 CAP | Refills: 1 | Status: ON HOLD | OUTPATIENT
Start: 2019-10-02 | End: 2019-10-25

## 2019-10-05 DIAGNOSIS — E78.00 HYPERCHOLESTEROLEMIA: ICD-10-CM

## 2019-10-05 RX ORDER — SIMVASTATIN 40 MG/1
TABLET, FILM COATED ORAL
Qty: 90 TAB | Refills: 1 | Status: SHIPPED | OUTPATIENT
Start: 2019-10-05 | End: 2020-05-21 | Stop reason: SDUPTHER

## 2019-10-25 ENCOUNTER — HOSPITAL ENCOUNTER (INPATIENT)
Age: 79
LOS: 2 days | Discharge: HOME | DRG: 871 | End: 2019-10-27
Attending: EMERGENCY MEDICINE | Admitting: INTERNAL MEDICINE
Payer: MEDICARE

## 2019-10-25 ENCOUNTER — APPOINTMENT (OUTPATIENT)
Dept: GENERAL RADIOLOGY | Age: 79
DRG: 871 | End: 2019-10-25
Attending: EMERGENCY MEDICINE
Payer: MEDICARE

## 2019-10-25 DIAGNOSIS — R31.9 URINARY TRACT INFECTION WITH HEMATURIA, SITE UNSPECIFIED: ICD-10-CM

## 2019-10-25 DIAGNOSIS — A41.9 SEPSIS WITHOUT ACUTE ORGAN DYSFUNCTION, DUE TO UNSPECIFIED ORGANISM (HCC): Primary | ICD-10-CM

## 2019-10-25 DIAGNOSIS — N39.0 URINARY TRACT INFECTION WITH HEMATURIA, SITE UNSPECIFIED: ICD-10-CM

## 2019-10-25 DIAGNOSIS — D69.6 THROMBOCYTOPENIA (HCC): ICD-10-CM

## 2019-10-25 PROBLEM — I10 HTN (HYPERTENSION): Status: ACTIVE | Noted: 2019-10-25

## 2019-10-25 LAB
ALBUMIN SERPL-MCNC: 3.6 G/DL (ref 3.5–5)
ALBUMIN/GLOB SERPL: 1.1 {RATIO} (ref 1.1–2.2)
ALP SERPL-CCNC: 77 U/L (ref 45–117)
ALT SERPL-CCNC: 23 U/L (ref 12–78)
ANION GAP SERPL CALC-SCNC: 7 MMOL/L (ref 5–15)
APPEARANCE UR: ABNORMAL
AST SERPL-CCNC: 31 U/L (ref 15–37)
BACTERIA URNS QL MICRO: ABNORMAL /HPF
BASOPHILS # BLD: 0 K/UL (ref 0–0.1)
BASOPHILS NFR BLD: 0 % (ref 0–1)
BILIRUB SERPL-MCNC: 0.6 MG/DL (ref 0.2–1)
BILIRUB UR QL: NEGATIVE
BUN SERPL-MCNC: 14 MG/DL (ref 6–20)
BUN/CREAT SERPL: 15 (ref 12–20)
CALCIUM SERPL-MCNC: 8.1 MG/DL (ref 8.5–10.1)
CHLORIDE SERPL-SCNC: 106 MMOL/L (ref 97–108)
CO2 SERPL-SCNC: 21 MMOL/L (ref 21–32)
COLOR UR: ABNORMAL
COMMENT, HOLDF: NORMAL
CREAT SERPL-MCNC: 0.91 MG/DL (ref 0.7–1.3)
DIFFERENTIAL METHOD BLD: ABNORMAL
EOSINOPHIL # BLD: 0 K/UL (ref 0–0.4)
EOSINOPHIL NFR BLD: 0 % (ref 0–7)
EPITH CASTS URNS QL MICRO: ABNORMAL /LPF
ERYTHROCYTE [DISTWIDTH] IN BLOOD BY AUTOMATED COUNT: 15.8 % (ref 11.5–14.5)
GLOBULIN SER CALC-MCNC: 3.4 G/DL (ref 2–4)
GLUCOSE SERPL-MCNC: 122 MG/DL (ref 65–100)
GLUCOSE UR STRIP.AUTO-MCNC: NEGATIVE MG/DL
HCT VFR BLD AUTO: 45.8 % (ref 36.6–50.3)
HGB BLD-MCNC: 15 G/DL (ref 12.1–17)
HGB UR QL STRIP: ABNORMAL
HYALINE CASTS URNS QL MICRO: ABNORMAL /LPF (ref 0–5)
IMM GRANULOCYTES # BLD AUTO: 0.1 K/UL (ref 0–0.04)
IMM GRANULOCYTES NFR BLD AUTO: 1 % (ref 0–0.5)
KETONES UR QL STRIP.AUTO: 15 MG/DL
LACTATE BLD-SCNC: 0.87 MMOL/L (ref 0.4–2)
LEUKOCYTE ESTERASE UR QL STRIP.AUTO: ABNORMAL
LYMPHOCYTES # BLD: 0.3 K/UL (ref 0.8–3.5)
LYMPHOCYTES NFR BLD: 5 % (ref 12–49)
MAGNESIUM SERPL-MCNC: 1.9 MG/DL (ref 1.6–2.4)
MCH RBC QN AUTO: 31.7 PG (ref 26–34)
MCHC RBC AUTO-ENTMCNC: 32.8 G/DL (ref 30–36.5)
MCV RBC AUTO: 96.8 FL (ref 80–99)
MONOCYTES # BLD: 0.4 K/UL (ref 0–1)
MONOCYTES NFR BLD: 6 % (ref 5–13)
NEUTS SEG # BLD: 5.7 K/UL (ref 1.8–8)
NEUTS SEG NFR BLD: 88 % (ref 32–75)
NITRITE UR QL STRIP.AUTO: POSITIVE
NRBC # BLD: 0 K/UL (ref 0–0.01)
NRBC BLD-RTO: 0 PER 100 WBC
PH UR STRIP: 6 [PH] (ref 5–8)
PLATELET # BLD AUTO: 59 K/UL (ref 150–400)
PMV BLD AUTO: 11.6 FL (ref 8.9–12.9)
POTASSIUM SERPL-SCNC: 4.2 MMOL/L (ref 3.5–5.1)
PROT SERPL-MCNC: 7 G/DL (ref 6.4–8.2)
PROT UR STRIP-MCNC: ABNORMAL MG/DL
RBC # BLD AUTO: 4.73 M/UL (ref 4.1–5.7)
RBC #/AREA URNS HPF: ABNORMAL /HPF (ref 0–5)
RBC MORPH BLD: ABNORMAL
SAMPLES BEING HELD,HOLD: NORMAL
SODIUM SERPL-SCNC: 134 MMOL/L (ref 136–145)
SP GR UR REFRACTOMETRY: 1.01 (ref 1–1.03)
TROPONIN I SERPL-MCNC: <0.05 NG/ML
UR CULT HOLD, URHOLD: NORMAL
UROBILINOGEN UR QL STRIP.AUTO: 0.2 EU/DL (ref 0.2–1)
WBC # BLD AUTO: 6.5 K/UL (ref 4.1–11.1)
WBC URNS QL MICRO: >100 /HPF (ref 0–4)

## 2019-10-25 PROCEDURE — 83735 ASSAY OF MAGNESIUM: CPT

## 2019-10-25 PROCEDURE — 65270000029 HC RM PRIVATE

## 2019-10-25 PROCEDURE — 0099U RESPIRATORY PANEL,PCR,NASOPHARYNGEAL: CPT

## 2019-10-25 PROCEDURE — 80053 COMPREHEN METABOLIC PANEL: CPT

## 2019-10-25 PROCEDURE — 87186 SC STD MICRODIL/AGAR DIL: CPT

## 2019-10-25 PROCEDURE — 99285 EMERGENCY DEPT VISIT HI MDM: CPT

## 2019-10-25 PROCEDURE — 93005 ELECTROCARDIOGRAM TRACING: CPT

## 2019-10-25 PROCEDURE — 74011250636 HC RX REV CODE- 250/636: Performed by: EMERGENCY MEDICINE

## 2019-10-25 PROCEDURE — 87077 CULTURE AEROBIC IDENTIFY: CPT

## 2019-10-25 PROCEDURE — 74011250637 HC RX REV CODE- 250/637: Performed by: EMERGENCY MEDICINE

## 2019-10-25 PROCEDURE — 74011250636 HC RX REV CODE- 250/636: Performed by: INTERNAL MEDICINE

## 2019-10-25 PROCEDURE — 36415 COLL VENOUS BLD VENIPUNCTURE: CPT

## 2019-10-25 PROCEDURE — 85025 COMPLETE CBC W/AUTO DIFF WBC: CPT

## 2019-10-25 PROCEDURE — 96365 THER/PROPH/DIAG IV INF INIT: CPT

## 2019-10-25 PROCEDURE — 83605 ASSAY OF LACTIC ACID: CPT

## 2019-10-25 PROCEDURE — 84484 ASSAY OF TROPONIN QUANT: CPT

## 2019-10-25 PROCEDURE — 96361 HYDRATE IV INFUSION ADD-ON: CPT

## 2019-10-25 PROCEDURE — 51701 INSERT BLADDER CATHETER: CPT

## 2019-10-25 PROCEDURE — 87040 BLOOD CULTURE FOR BACTERIA: CPT

## 2019-10-25 PROCEDURE — 74011250637 HC RX REV CODE- 250/637: Performed by: INTERNAL MEDICINE

## 2019-10-25 PROCEDURE — 74011000258 HC RX REV CODE- 258: Performed by: INTERNAL MEDICINE

## 2019-10-25 PROCEDURE — 71046 X-RAY EXAM CHEST 2 VIEWS: CPT

## 2019-10-25 PROCEDURE — 87086 URINE CULTURE/COLONY COUNT: CPT

## 2019-10-25 PROCEDURE — 81001 URINALYSIS AUTO W/SCOPE: CPT

## 2019-10-25 RX ORDER — ZOLPIDEM TARTRATE 5 MG/1
10 TABLET ORAL
Status: DISCONTINUED | OUTPATIENT
Start: 2019-10-25 | End: 2019-10-25

## 2019-10-25 RX ORDER — ACETAMINOPHEN 500 MG
1000 TABLET ORAL
Status: COMPLETED | OUTPATIENT
Start: 2019-10-25 | End: 2019-10-25

## 2019-10-25 RX ORDER — SODIUM CHLORIDE 0.9 % (FLUSH) 0.9 %
5-40 SYRINGE (ML) INJECTION AS NEEDED
Status: DISCONTINUED | OUTPATIENT
Start: 2019-10-25 | End: 2019-10-27 | Stop reason: HOSPADM

## 2019-10-25 RX ORDER — ZOLPIDEM TARTRATE 5 MG/1
5 TABLET ORAL
Status: ON HOLD | COMMUNITY
End: 2021-01-25

## 2019-10-25 RX ORDER — SIMVASTATIN 20 MG/1
40 TABLET, FILM COATED ORAL
Status: DISCONTINUED | OUTPATIENT
Start: 2019-10-25 | End: 2019-10-27 | Stop reason: HOSPADM

## 2019-10-25 RX ORDER — LEVOFLOXACIN 5 MG/ML
500 INJECTION, SOLUTION INTRAVENOUS EVERY 24 HOURS
Status: DISCONTINUED | OUTPATIENT
Start: 2019-10-26 | End: 2019-10-25

## 2019-10-25 RX ORDER — METOPROLOL SUCCINATE 25 MG/1
12.5 TABLET, EXTENDED RELEASE ORAL DAILY
Status: DISCONTINUED | OUTPATIENT
Start: 2019-10-26 | End: 2019-10-27 | Stop reason: HOSPADM

## 2019-10-25 RX ORDER — GUAIFENESIN/DEXTROMETHORPHAN 100-10MG/5
10 SYRUP ORAL
Status: DISCONTINUED | OUTPATIENT
Start: 2019-10-25 | End: 2019-10-27 | Stop reason: HOSPADM

## 2019-10-25 RX ORDER — VITAMIN E 1000 UNIT
500 CAPSULE ORAL DAILY
COMMUNITY

## 2019-10-25 RX ORDER — TAMSULOSIN HYDROCHLORIDE 0.4 MG/1
0.4 CAPSULE ORAL DAILY
Status: DISCONTINUED | OUTPATIENT
Start: 2019-10-26 | End: 2019-10-27 | Stop reason: HOSPADM

## 2019-10-25 RX ORDER — LEVOFLOXACIN 5 MG/ML
750 INJECTION, SOLUTION INTRAVENOUS
Status: COMPLETED | OUTPATIENT
Start: 2019-10-25 | End: 2019-10-25

## 2019-10-25 RX ORDER — METOPROLOL SUCCINATE 25 MG/1
12.5 TABLET, EXTENDED RELEASE ORAL
COMMUNITY
End: 2019-12-10 | Stop reason: SDUPTHER

## 2019-10-25 RX ORDER — SODIUM CHLORIDE 0.9 % (FLUSH) 0.9 %
5-10 SYRINGE (ML) INJECTION AS NEEDED
Status: DISCONTINUED | OUTPATIENT
Start: 2019-10-25 | End: 2019-10-27 | Stop reason: HOSPADM

## 2019-10-25 RX ORDER — SODIUM CHLORIDE 9 MG/ML
75 INJECTION, SOLUTION INTRAVENOUS CONTINUOUS
Status: DISCONTINUED | OUTPATIENT
Start: 2019-10-25 | End: 2019-10-27 | Stop reason: HOSPADM

## 2019-10-25 RX ORDER — ENOXAPARIN SODIUM 100 MG/ML
40 INJECTION SUBCUTANEOUS EVERY 24 HOURS
Status: DISCONTINUED | OUTPATIENT
Start: 2019-10-25 | End: 2019-10-25

## 2019-10-25 RX ORDER — TAMSULOSIN HYDROCHLORIDE 0.4 MG/1
0.4 CAPSULE ORAL
COMMUNITY
End: 2020-10-11

## 2019-10-25 RX ORDER — THERA TABS 400 MCG
1 TAB ORAL DAILY
COMMUNITY

## 2019-10-25 RX ORDER — ZOLPIDEM TARTRATE 5 MG/1
5 TABLET ORAL
Status: DISCONTINUED | OUTPATIENT
Start: 2019-10-25 | End: 2019-10-27 | Stop reason: HOSPADM

## 2019-10-25 RX ORDER — IPRATROPIUM BROMIDE AND ALBUTEROL SULFATE 2.5; .5 MG/3ML; MG/3ML
3 SOLUTION RESPIRATORY (INHALATION)
Status: DISCONTINUED | OUTPATIENT
Start: 2019-10-25 | End: 2019-10-27 | Stop reason: HOSPADM

## 2019-10-25 RX ORDER — SODIUM CHLORIDE 0.9 % (FLUSH) 0.9 %
5-40 SYRINGE (ML) INJECTION EVERY 8 HOURS
Status: DISCONTINUED | OUTPATIENT
Start: 2019-10-25 | End: 2019-10-27 | Stop reason: HOSPADM

## 2019-10-25 RX ADMIN — LEVOFLOXACIN 750 MG: 5 INJECTION, SOLUTION INTRAVENOUS at 17:55

## 2019-10-25 RX ADMIN — SODIUM CHLORIDE 75 ML/HR: 900 INJECTION, SOLUTION INTRAVENOUS at 23:44

## 2019-10-25 RX ADMIN — ACETAMINOPHEN 1000 MG: 500 TABLET ORAL at 17:02

## 2019-10-25 RX ADMIN — CEFTRIAXONE 2 G: 2 INJECTION, POWDER, FOR SOLUTION INTRAMUSCULAR; INTRAVENOUS at 23:44

## 2019-10-25 RX ADMIN — SIMVASTATIN 40 MG: 20 TABLET, FILM COATED ORAL at 23:44

## 2019-10-25 RX ADMIN — SODIUM CHLORIDE 1000 ML: 900 INJECTION, SOLUTION INTRAVENOUS at 17:02

## 2019-10-25 RX ADMIN — Medication 10 ML: at 22:22

## 2019-10-25 NOTE — ED TRIAGE NOTES
PT arrives via EMS from [facility] for weakness/ dizziness x 3 days. Pt believes he has an infection to stump at right shoulder. Has been bothering him for approx 6 mo. Low grade fever for EMS with O2 sats around 89%. Pt has seen PCP for cellulitis on stump. Given amoxicillin and keflex. Red area with purulent drainage noted.

## 2019-10-25 NOTE — ED PROVIDER NOTES
78 y.o. male with past medical history significant for CAD, hypercholesteremia, venous insufficiency, UTI, melanoma, DNR who presents via EMS to the ED with chief complaint of lethargy. Patient reports onset of lethargy for the past three days, with associated fever and dry cough. Patient states that he has some weakness at baseline, but that weakness has worsened and that he now has difficulty walking. Patient endorses that present symptoms are similar to previous UTI, but states that he has never had a cath placement. Patient also notes that he has had a raised \"bump\" on his left shoulder \"for a while\", but denies seeing a dermatologist. He endorses history of appendectomy and denies use of lasix. Patient endorses minimal constipation. He states that he had some nausea which is now resolved. He denies shortness of breath, chest pain, myalgias and vomiting. There are no other acute medical concerns at this time. Social Hx: no Tobacco use, no EtOH use, no Illicit Drug use  PCP: John Peterson MD    Note written by Lucila Martinez, as dictated by Jada Fenton MD 4:08 PM      The history is provided by the patient. No  was used. Past Medical History:   Diagnosis Date    Benign essential HTN     BPH (benign prostatic hyperplasia)     [place] in [place] CAD in native artery     s/p stent x2. [place] Dr[place]    DNR (do not resuscitate) 4/17/2018    Hx of diverticulitis of colon     Hypercholesterolemia     Melanoma ([facility]     x4. 2013. superficial per pt    Phantom pain ([facility] [address]     L arm    Post-polio syndrome age 4    dx 2004 in [place]. He still drives. 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  HX CORONARY STENT PLACEMENT  2006    HX CORONARY STENT PLACEMENT  2004? 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 Social Needs    Financial resource strain: Not on file    Food insecurity:     Worry: Not on file     Inability: Not on file    Transportation needs:     Medical: Not on file     Non-medical: Not on file   Tobacco Use    Smoking status: Never Smoker    Smokeless tobacco: Never Used   Substance and Sexual Activity    Alcohol use: No    Drug use: Never    Sexual activity: Yes   Lifestyle    Physical activity:     Days per week: Not on file     Minutes per session: Not on file    Stress: Not on file   Relationships    Social connections:     Talks on phone: Not on file     Gets together: Not on file     Attends Latter-day service: Not on file     Active member of club or organization: Not on file     Attends meetings of clubs or organizations: Not on file     Relationship status: Not on file    Intimate partner violence:     Fear of current or ex partner: Not on file     Emotionally abused: Not on file     Physically abused: Not on file     Forced sexual activity: Not on file   Other Topics Concern    Not on file   Social History Narrative    Not on file         ALLERGIES: Patient has no known allergies. Review of Systems   Constitutional: Positive for fever. Negative for fatigue. Respiratory: Positive for cough. Negative for shortness of breath. Cardiovascular: Negative for chest pain. Gastrointestinal: Positive for constipation. Negative for nausea (resolved) and vomiting. Musculoskeletal: Negative for myalgias. All other systems reviewed and are negative. Vitals:    10/25/19 1543   BP: 143/74   Pulse: 80   Resp: 14   Temp: 99.6 °F (37.6 °C)   SpO2: 90%   Weight: 95.3 kg (210 lb)   Height: 6' 1\" (1.854 m)            Physical Exam   Constitutional: He appears well-developed and well-nourished. He appears ill. No distress. HENT:   Head: Normocephalic and atraumatic. Eyes: Conjunctivae are normal.   Neck: Neck supple. No tracheal deviation present. Cardiovascular: Normal rate and regular rhythm. Pulmonary/Chest: Effort normal. Tachypnea noted. No respiratory distress. He has rales in the left upper field. Bibasilar rales   Abdominal: He exhibits distension (mild). There is no tenderness. Musculoskeletal: Normal range of motion. He exhibits no deformity. Neurological: He is alert. No cranial nerve deficit. Skin: Skin is warm and dry. Left shoulder has 1 cm well circumscribed raised plaque with crusting   Psychiatric: His behavior is normal.   Nursing note and vitals reviewed. Note written by Lucila Martinez, as dictated by Jada Fenton MD 4:08 PM    MDM     78-year-old male presents with fever and tachypnea with notable urinary tract infection concerning for developing sepsis. He has felt progressively weak over the last week and had been treated previously for a soft tissue infection of the left shoulder with Keflex. Source is most likely urinary at this point and due to recent cephalosporin treatment Levaquin was ordered for complicated UTI therapy pending culture data. Lactic acid was normal and no evidence of endorgan damage currently.     Procedures    ED EKG interpretation:  Rhythm: normal sinus rhythm; Rate (approx.): 80 bpm;ST/T wave: non specific T wave abnormality; no change from prior    Note written by Lucila Cordova, as dictated by Betsy Caputo MD 5:27 PM    PROGRESS NOTE:  5:30 PM  Spoke with patient, who states that he has recently been on Keflex and that his last dose was 10 days ago    Hospitalist TigerText for Admission  5:31 PM    ED Room Number: ER09/09  Patient Name and age:  Mary Guadalupe 78 y.o.  male  Working Diagnosis:   1. Sepsis without acute organ dysfunction, due to unspecified organism ([facility] [address]    2.  Urinary tract infection with hematuria, site unspecified      Readmission: no  Isolation Requirements:  no  Recommended Level of Care:  telemetry  Code Status:  Full

## 2019-10-25 NOTE — H&P
Hospitalist Admission Note    NAME: Mary Cordova   :  1940   MRN:  [id number]     Date/Time:  10/25/2019 6:15 PM    Patient PCP: Marvin Dangelo MD  ________________________________________________________________________    My assessment of this patient's clinical condition and my plan of care is as follows. Assessment / Plan:    1) UTI: WCC 6.5, Lactic acid 0.89, T 100.8. Will start levaquin, F/U Cx, hydration    2) Hx HTN: Controlled, continue home meds, and adjust as needed    3) L shoulder lesion: Concern for skin malignancy. 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 Male PMH CAD, Polio as a child , Hyperlipidemia, melanoma, who comes to the ED because 2-3 days of weakness, low grade fever and occasional cough . Patient also mentioned having a lesion in his L shoulder going on for weeks . He denies CP, SOB, N/V or any other symptom. We were asked to admit for work up and evaluation of the above problems. Past Medical History:   Diagnosis Date    Benign essential HTN     BPH (benign prostatic hyperplasia)     [place]rology in [place] CAD in native artery     s/p stent x2. saw Dr. Yamil Kennedy [place]    DNR (do not resuscitate) 2018    Hx of diverticulitis of colon     Hypercholesterolemia     Melanoma ([facility] [address]     x4. 2013. superficial per pt    Phantom pain ([address]     L arm    Post-polio syndrome age 4    dx  in [place]. He still drives. 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  HX CORONARY STENT PLACEMENT  2006    HX CORONARY STENT PLACEMENT  2004?  HX HERNIA REPAIR Left     HX TONSILLECTOMY      HX VEIN STRIPPING         Social History     Tobacco Use    Smoking status: Never Smoker    Smokeless tobacco: Never Used   Substance Use Topics    Alcohol use: No        Family History   Problem Relation Age of Onset    Diabetes Mother      No Known Allergies     Prior to Admission medications    Medication Sig Start Date End Date Taking? Authorizing Provider   simvastatin (ZOCOR) 40 mg tablet TAKE 1 TABLET BY MOUTH AT BEDTIME 10/5/19   Mindy Chavarria MD   tamsulosin (FLOMAX) 0.4 mg capsule Take 2 Caps by mouth daily. 10/2/19   Mindy Chavarria MD   cephALEXin (KEFLEX) 500 mg capsule Take 1 Cap by mouth four (4) times daily. 9/17/19   Mindy Chavarria MD   metoprolol succinate (TOPROL-XL) 25 mg XL tablet TAKE 1 TABLET BY MOUTH EVERY DAY 6/14/19   Araceli Morel MD   zolpidem (AMBIEN) 10 mg tablet Take 1 Tab by mouth nightly as needed for Sleep. Max Daily Amount: 10 mg. Patient taking differently: Take 10 mg by mouth as needed for Sleep. 4/4/19   Mindy Chavarria MD   OTHER,NON-FORMULARY, [employer] dry eye 1 gtt both eyes night    Provider, Historical   MULTIVIT &MINERALS/FERROUS FUM (MULTI VITAMIN PO) Take  by mouth. Provider, Historical   ASCORBATE CALCIUM (VITAMIN C PO) Take  by mouth. Provider, Historical       REVIEW OF SYSTEMS:     I am not able to complete the review of systems because:    The patient is intubated and sedated    The patient has altered mental status due to his acute medical problems    The patient has baseline aphasia from prior stroke(s)    The patient has baseline dementia and is not reliable historian    The patient is in acute medical distress and unable to provide information           Total of 12 systems reviewed as follows:       POSITIVE= underlined text  Negative = text not underlined  General:  fever, chills, sweats, generalized weakness, weight loss/gain,      loss of appetite   Eyes:    blurred vision, eye pain, loss of vision, double vision  ENT:    rhinorrhea, pharyngitis   Respiratory:   cough, sputum production, SOB, LUA, wheezing, pleuritic pain   Cardiology:   chest pain, palpitations, orthopnea, PND, edema, syncope   Gastrointestinal:  abdominal pain , N/V, diarrhea, dysphagia, constipation, bleeding   Genitourinary:  frequency, urgency, dysuria, hematuria, incontinence   Muskuloskeletal :  arthralgia, myalgia, back pain  Hematology:  easy bruising, nose or gum bleeding, lymphadenopathy   Dermatological: rash, ulceration, pruritis, color change / jaundice  Endocrine:   hot flashes or polydipsia   Neurological:  headache, dizziness, confusion, focal weakness, paresthesia,     Speech difficulties, memory loss, gait difficulty  Psychological: Feelings of anxiety, depression, agitation    Objective:   VITALS:    Visit Vitals  /74 (BP 1 Location: Right arm)   Pulse 80   Temp (!) 100.8 °F (38.2 °C)   Resp 22   Ht 6' 1\" (1.854 m)   Wt 95.3 kg (210 lb)   SpO2 97%   BMI 27.71 kg/m²       PHYSICAL EXAM:    General:    Alert, cooperative, no distress, appears stated age. HEENT: Atraumatic, anicteric sclerae, pink conjunctivae     No oral ulcers, mucosa moist, throat clear, dentition fair  Neck:  Supple, symmetrical,  thyroid: non tender  Lungs:   Clear to auscultation bilaterally. No Wheezing or Rhonchi. No rales. Chest wall:  No tenderness  No Accessory muscle use. Heart:   Regular  rhythm,  No  murmur   No edema  Abdomen:   Soft, non-tender. Not distended.   Bowel sounds normal  Extremities: L arm amputation, Left shoulder bump lesion 4x5 cm, No cyanosis. No clubbing,      Skin turgor normal, Capillary refill normal, Radial dial pulse 2+  Skin:     Not pale. Not Jaundiced  No rashes   Psych:  Good insight. Not depressed. Not anxious or agitated. Neurologic: EOMs intact. No facial asymmetry. No aphasia or slurred speech. Weakness in SAIRA, Sensation grossly intact. Alert and oriented X 3.     _______________________________________________________________________  Care Plan discussed with:    Comments   Patient X    Family  X    RN     Care Manager                    Consultant:  KODAK CASAS MD   _______________________________________________________________________  Expected  Disposition:   Home with Family X   HH/PT/OT/RN    SNF/LTC    SERENA    ________________________________________________________________________  TOTAL TIME:  28 Minutes    Critical Care Provided     Minutes non procedure based      Comments     Reviewed previous records   >50% of visit spent in counseling and coordination of care  Discussion with patient and/or family and questions answered       ________________________________________________________________________  Signed: Valente Cross MD    Procedures: see electronic medical records for all procedures/Xrays and details which were not copied into this note but were reviewed prior to creation of Plan.     LAB DATA REVIEWED:    Recent Results (from the past 24 hour(s))   CBC WITH AUTOMATED DIFF    Collection Time: 10/25/19  4:11 PM   Result Value Ref Range    WBC 6.5 4.1 - 11.1 K/uL    RBC 4.73 4.10 - 5.70 M/uL    HGB 15.0 12.1 - 17.0 g/dL    HCT 45.8 36.6 - 50.3 %    MCV 96.8 80.0 - 99.0 FL    MCH 31.7 26.0 - 34.0 PG    MCHC 32.8 30.0 - 36.5 g/dL    RDW 15.8 (H) 11.5 - 14.5 %    PLATELET 59 (L) 453 - 400 K/uL    MPV 11.6 8.9 - 12.9 FL    NRBC 0.0 0  WBC    ABSOLUTE NRBC 0.00 0.00 - 0.01 K/uL    NEUTROPHILS 88 (H) 32 - 75 %    LYMPHOCYTES 5 (L) 12 - 49 %    MONOCYTES 6 5 - 13 % EOSINOPHILS 0 0 - 7 %    BASOPHILS 0 0 - 1 %    IMMATURE GRANULOCYTES 1 (H) 0.0 - 0.5 %    ABS. NEUTROPHILS 5.7 1.8 - 8.0 K/UL    ABS. LYMPHOCYTES 0.3 (L) 0.8 - 3.5 K/UL    ABS. MONOCYTES 0.4 0.0 - 1.0 K/UL    ABS. EOSINOPHILS 0.0 0.0 - 0.4 K/UL    ABS. BASOPHILS 0.0 0.0 - 0.1 K/UL    ABS. IMM. GRANS. 0.1 (H) 0.00 - 0.04 K/UL    DF SMEAR SCANNED      RBC COMMENTS ANISOCYTOSIS  1+       METABOLIC PANEL, COMPREHENSIVE    Collection Time: 10/25/19  4:11 PM   Result Value Ref Range    Sodium 134 (L) 136 - 145 mmol/L    Potassium 4.2 3.5 - 5.1 mmol/L    Chloride 106 97 - 108 mmol/L    CO2 21 21 - 32 mmol/L    Anion gap 7 5 - 15 mmol/L    Glucose 122 (H) 65 - 100 mg/dL    BUN 14 6 - 20 MG/DL    Creatinine 0.91 0.70 - 1.30 MG/DL    BUN/Creatinine ratio 15 12 - 20      GFR est AA >60 >60 ml/min/1.73m2    GFR est non-AA >60 >60 ml/min/1.73m2    Calcium 8.1 (L) 8.5 - 10.1 MG/DL    Bilirubin, total 0.6 0.2 - 1.0 MG/DL    ALT (SGPT) 23 12 - 78 U/L    AST (SGOT) 31 15 - 37 U/L    Alk. phosphatase 77 45 - 117 U/L    Protein, total 7.0 6.4 - 8.2 g/dL    Albumin 3.6 3.5 - 5.0 g/dL    Globulin 3.4 2.0 - 4.0 g/dL    A-G Ratio 1.1 1.1 - 2.2     MAGNESIUM    Collection Time: 10/25/19  4:11 PM   Result Value Ref Range    Magnesium 1.9 1.6 - 2.4 mg/dL   TROPONIN I    Collection Time: 10/25/19  4:11 PM   Result Value Ref Range    Troponin-I, Qt. <0.05 <0.05 ng/mL   SAMPLES BEING HELD    Collection Time: 10/25/19  4:11 PM   Result Value Ref Range    SAMPLES BEING HELD 1SST,1RED,1BLU     COMMENT        Add-on orders for these samples will be processed based on acceptable specimen integrity and analyte stability, which may vary by analyte.    POC LACTIC ACID    Collection Time: 10/25/19  4:13 PM   Result Value Ref Range    Lactic Acid (POC) 0.87 0.40 - 2.00 mmol/L   URINALYSIS W/MICROSCOPIC    Collection Time: 10/25/19  4:55 PM   Result Value Ref Range    Color YELLOW/STRAW      Appearance CLOUDY (A) CLEAR      Specific gravity 1.012 1.003 - 1.030      pH (UA) 6.0 5.0 - 8.0      Protein TRACE (A) NEG mg/dL    Glucose NEGATIVE  NEG mg/dL    Ketone 15 (A) NEG mg/dL    Bilirubin NEGATIVE  NEG      Blood MODERATE (A) NEG      Urobilinogen 0.2 0.2 - 1.0 EU/dL    Nitrites POSITIVE (A) NEG      Leukocyte Esterase LARGE (A) NEG      WBC >100 (H) 0 - 4 /hpf    RBC 20-50 0 - 5 /hpf    Epithelial cells FEW FEW /lpf    Bacteria 4+ (A) NEG /hpf    Hyaline cast 0-2 0 - 5 /lpf   URINE CULTURE HOLD SAMPLE    Collection Time: 10/25/19  4:55 PM   Result Value Ref Range    Urine culture hold        URINE ON HOLD IN MICROBIOLOGY DEPT FOR 3 DAYS. IF UNPRESERVED URINE IS SUBMITTED, IT CANNOT BE USED FOR ADDITIONAL TESTING AFTER 24 HRS, RECOLLECTION WILL BE REQUIRED.    EKG, 12 LEAD, INITIAL    Collection Time: 10/25/19  5:06 PM   Result Value Ref Range    Ventricular Rate 80 BPM    Atrial Rate 80 BPM    P-R Interval 186 ms    QRS Duration 100 ms    Q-T Interval 362 ms    QTC Calculation (Bezet) 417 ms    Calculated P Axis 58 degrees    Calculated R Axis -9 degrees    Calculated T Axis 61 degrees    Diagnosis       Normal sinus rhythm  Possible Left atrial enlargement  Nonspecific T wave abnormality  Abnormal ECG  When compared with ECG of 16-MAR-2019 12:40,  No significant change was found

## 2019-10-25 NOTE — ED NOTES
Straight cath completed using sterile procedures. 300 mls of dark yellow urine returned, specimen to lab.

## 2019-10-26 ENCOUNTER — APPOINTMENT (OUTPATIENT)
Dept: CT IMAGING | Age: 79
DRG: 871 | End: 2019-10-26
Attending: INTERNAL MEDICINE
Payer: MEDICARE

## 2019-10-26 PROBLEM — D69.6 THROMBOCYTOPENIA (HCC): Status: ACTIVE | Noted: 2019-10-26

## 2019-10-26 LAB
ANION GAP SERPL CALC-SCNC: 6 MMOL/L (ref 5–15)
B PERT DNA SPEC QL NAA+PROBE: NOT DETECTED
BUN SERPL-MCNC: 15 MG/DL (ref 6–20)
BUN/CREAT SERPL: 18 (ref 12–20)
C PNEUM DNA SPEC QL NAA+PROBE: NOT DETECTED
CALCIUM SERPL-MCNC: 7.8 MG/DL (ref 8.5–10.1)
CHLORIDE SERPL-SCNC: 109 MMOL/L (ref 97–108)
CO2 SERPL-SCNC: 23 MMOL/L (ref 21–32)
CREAT SERPL-MCNC: 0.82 MG/DL (ref 0.7–1.3)
ERYTHROCYTE [DISTWIDTH] IN BLOOD BY AUTOMATED COUNT: 15.9 % (ref 11.5–14.5)
FLUAV H1 2009 PAND RNA SPEC QL NAA+PROBE: NOT DETECTED
FLUAV H1 RNA SPEC QL NAA+PROBE: NOT DETECTED
FLUAV H3 RNA SPEC QL NAA+PROBE: NOT DETECTED
FLUAV SUBTYP SPEC NAA+PROBE: NOT DETECTED
FLUBV RNA SPEC QL NAA+PROBE: NOT DETECTED
GLUCOSE SERPL-MCNC: 104 MG/DL (ref 65–100)
HADV DNA SPEC QL NAA+PROBE: NOT DETECTED
HCOV 229E RNA SPEC QL NAA+PROBE: NOT DETECTED
HCOV HKU1 RNA SPEC QL NAA+PROBE: NOT DETECTED
HCOV NL63 RNA SPEC QL NAA+PROBE: NOT DETECTED
HCOV OC43 RNA SPEC QL NAA+PROBE: NOT DETECTED
HCT VFR BLD AUTO: 43.2 % (ref 36.6–50.3)
HGB BLD-MCNC: 14.2 G/DL (ref 12.1–17)
HMPV RNA SPEC QL NAA+PROBE: NOT DETECTED
HPIV1 RNA SPEC QL NAA+PROBE: DETECTED
HPIV2 RNA SPEC QL NAA+PROBE: NOT DETECTED
HPIV3 RNA SPEC QL NAA+PROBE: NOT DETECTED
HPIV4 RNA SPEC QL NAA+PROBE: NOT DETECTED
LACTATE SERPL-SCNC: 0.8 MMOL/L (ref 0.4–2)
M PNEUMO DNA SPEC QL NAA+PROBE: NOT DETECTED
MAGNESIUM SERPL-MCNC: 1.8 MG/DL (ref 1.6–2.4)
MCH RBC QN AUTO: 31.8 PG (ref 26–34)
MCHC RBC AUTO-ENTMCNC: 32.9 G/DL (ref 30–36.5)
MCV RBC AUTO: 96.9 FL (ref 80–99)
NRBC # BLD: 0 K/UL (ref 0–0.01)
NRBC BLD-RTO: 0 PER 100 WBC
PHOSPHATE SERPL-MCNC: 2.1 MG/DL (ref 2.6–4.7)
PLATELET # BLD AUTO: 54 K/UL (ref 150–400)
PMV BLD AUTO: 10.6 FL (ref 8.9–12.9)
POTASSIUM SERPL-SCNC: 3.7 MMOL/L (ref 3.5–5.1)
RBC # BLD AUTO: 4.46 M/UL (ref 4.1–5.7)
RSV RNA SPEC QL NAA+PROBE: NOT DETECTED
RV+EV RNA SPEC QL NAA+PROBE: NOT DETECTED
SODIUM SERPL-SCNC: 138 MMOL/L (ref 136–145)
WBC # BLD AUTO: 5.2 K/UL (ref 4.1–11.1)

## 2019-10-26 PROCEDURE — 74011000258 HC RX REV CODE- 258: Performed by: INTERNAL MEDICINE

## 2019-10-26 PROCEDURE — 71250 CT THORAX DX C-: CPT

## 2019-10-26 PROCEDURE — 74011250637 HC RX REV CODE- 250/637: Performed by: INTERNAL MEDICINE

## 2019-10-26 PROCEDURE — 36415 COLL VENOUS BLD VENIPUNCTURE: CPT

## 2019-10-26 PROCEDURE — 74011000250 HC RX REV CODE- 250: Performed by: INTERNAL MEDICINE

## 2019-10-26 PROCEDURE — 84100 ASSAY OF PHOSPHORUS: CPT

## 2019-10-26 PROCEDURE — 65270000029 HC RM PRIVATE

## 2019-10-26 PROCEDURE — 74011250636 HC RX REV CODE- 250/636: Performed by: INTERNAL MEDICINE

## 2019-10-26 PROCEDURE — 83735 ASSAY OF MAGNESIUM: CPT

## 2019-10-26 PROCEDURE — 85027 COMPLETE CBC AUTOMATED: CPT

## 2019-10-26 PROCEDURE — 80048 BASIC METABOLIC PNL TOTAL CA: CPT

## 2019-10-26 PROCEDURE — 83605 ASSAY OF LACTIC ACID: CPT

## 2019-10-26 RX ADMIN — SIMVASTATIN 40 MG: 20 TABLET, FILM COATED ORAL at 22:49

## 2019-10-26 RX ADMIN — TAMSULOSIN HYDROCHLORIDE 0.4 MG: 0.4 CAPSULE ORAL at 09:22

## 2019-10-26 RX ADMIN — METOPROLOL SUCCINATE 12.5 MG: 25 TABLET, EXTENDED RELEASE ORAL at 09:22

## 2019-10-26 RX ADMIN — Medication 10 ML: at 22:50

## 2019-10-26 RX ADMIN — AZITHROMYCIN MONOHYDRATE 500 MG: 500 INJECTION, POWDER, LYOPHILIZED, FOR SOLUTION INTRAVENOUS at 09:20

## 2019-10-26 RX ADMIN — POTASSIUM PHOSPHATE, MONOBASIC AND POTASSIUM PHOSPHATE, DIBASIC: 224; 236 INJECTION, SOLUTION, CONCENTRATE INTRAVENOUS at 09:00

## 2019-10-26 RX ADMIN — CEFTRIAXONE 2 G: 2 INJECTION, POWDER, FOR SOLUTION INTRAMUSCULAR; INTRAVENOUS at 22:50

## 2019-10-26 NOTE — CONSULTS
Surgery Consult    Subjective:      Amparo Hartley is a 78 y.o. male who I was asked to evaluate for a skin lesion on his left shoulder. Patient states that it started as a small pimple about four months ago. He has been treating it with local antibiotic cream and was prescribed po antibiotics by his PCP. He had a melanoma excised from that area about 20 years ago at [facility]-   He was admitted for history of fever and has been worked up for this. Past Medical History:   Diagnosis Date    Benign essential HTN     BPH (benign prostatic hyperplasia)     [place]rology in [place] CAD in native artery     s/p stent x2. saw Dr. Racheal Toledo in [facility] [place]    DNR (do not resuscitate) 4/17/2018    Hx of diverticulitis of colon     Hypercholesterolemia     Melanoma ([facility] [address]     x4. 2013. superficial per pt    Phantom pain ([facility] [address]     L arm    Post-polio syndrome age 4    dx 2004 in [place]. He still drives. weakness, R deltoid weakness, s/p L arm amputation, R hip pain, R leg-ankle brace    Renal cyst     8 cm, increasing 20 years    Right hip pain     chronic, polio    Shingles     Thrombocytopenia (HCC)     chronic    Venous insufficiency     s/p venous stripping    Weakness of right leg     Zoster 1982     Past Surgical History:   Procedure Laterality Date    HX AMPUTATION Left 1953    arm, from polio, phantom pain    HX APPENDECTOMY      HX COLONOSCOPY  2008?  HX CORONARY STENT PLACEMENT  2006    HX CORONARY STENT PLACEMENT  2004? 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    Tobacco Use    Smoking status: Never Smoker    Smokeless tobacco: Never Used   Substance and Sexual Activity    Alcohol use: No    Drug use: Never    Sexual activity: Yes      Current Facility-Administered Medications   Medication Dose Route Frequency    potassium phosphate 30 mmol in 0.9% sodium chloride 250 mL infusion   IntraVENous ONCE    sodium chloride (NS) flush 5-10 mL  5-10 mL IntraVENous PRN    metoprolol succinate (TOPROL-XL) XL tablet 12.5 mg  12.5 mg Oral DAILY    simvastatin (ZOCOR) tablet 40 mg  40 mg Oral QHS    tamsulosin (FLOMAX) capsule 0.4 mg  0.4 mg Oral DAILY    sodium chloride (NS) flush 5-40 mL  5-40 mL IntraVENous Q8H    sodium chloride (NS) flush 5-40 mL  5-40 mL IntraVENous PRN    zolpidem (AMBIEN) tablet 5 mg  5 mg Oral QHS PRN    guaiFENesin-dextromethorphan (ROBITUSSIN DM) 100-10 mg/5 mL syrup 10 mL  10 mL Oral Q6H PRN    albuterol-ipratropium (DUO-NEB) 2.5 MG-0.5 MG/3 ML  3 mL Nebulization Q4H PRN    cefTRIAXone (ROCEPHIN) 2 g in 0.9% sodium chloride (MBP/ADV) 50 mL  2 g IntraVENous Q24H    0.9% sodium chloride infusion  75 mL/hr IntraVENous CONTINUOUS      No Known Allergies    Review of Systems:REVIEW OF SYSTEMS:     []     Unable to obtain  ROS due to  []    mental status change  []    sedated   []    intubated   []    Total of 12 systems reviewed as follows:  Constitutional: negative fever, negative chills, negative weight loss  Eyes:   negative visual changes  ENT:   negative sore throat, tongue or lip swelling  Respiratory:  negative cough, negative dyspnea  Cards:  negative for chest pain, palpitations, lower extremity edema  GI:   negative for nausea, vomiting, diarrhea, and abdominal pain  :  negative for frequency, dysuria  Integument:  negative for rash and pruritus  Heme:  negative for easy bruising and gum/nose bleeding  Musculoskel: negative for myalgias,  back pain and muscle weakness  Neuro:  negative for headaches, dizziness, vertigo  Psych:  negative for feelings of anxiety, depression Objective:        Patient Vitals for the past 8 hrs:   BP Temp Pulse Resp SpO2   10/26/19 0405 150/81 98 °F (36.7 °C) 68 16 93 %       Temp (24hrs), Av.8 °F (37.1 °C), Min:97.5 °F (36.4 °C), Max:100.8 °F (38.2 °C)      Physical Exam:  General:  Alert, cooperative, no distress, appears stated age. Eyes:  Conjunctivae/corneas clear. PERRL, EOMs intact. Nose: Nares normal. Septum midline. Mucosa normal. No drainage or sinus tenderness. Mouth/Throat: Lips, mucosa, and tongue normal. Teeth and gums normal.   Neck: Supple, symmetrical, trachea midline, no adenopathy, thyroid: no enlargment/tenderness/nodules, no carotid bruit and no JVD. Back:   Symmetric, no curvature. ROM normal. No CVA tenderness. Lungs:   Clear to auscultation bilaterally. Heart:  Regular rate and rhythm, S1, S2 normal, no murmur, click, rub or gallop. Abdomen:   Soft, non-tender. Bowel sounds normal. No masses,  No organomegaly. Extremities: Extremities normal, atraumatic, no cyanosis or edema. Pulses: 2+ and symmetric all extremities. Skin: Skin color, texture, turgor normal. Left shoulder there is a 2 cm nodule with central umbilication - surrounding telangiectasia over a much wider area. - concerning for malignancy    Lymph nodes: Cervical, supraclavicular, and axillary nodes normal.     Labs:   Recent Labs     10/26/19  0243   WBC 5.2   HGB 14.2   HCT 43.2   PLT 54*     Recent Labs     10/26/19  0243 10/25/19  1611    134*   K 3.7 4.2   * 106   CO2 23 21   * 122*   BUN 15 14   CREA 0.82 0.91   CA 7.8* 8.1*   MG 1.8 1.9   PHOS 2.1*  --    ALB  --  3.6   TBILI  --  0.6   SGOT  --  31   ALT  --  23     No results for input(s): INR, INREXT in the last 72 hours. Assessment:     Skin lesion - suspicious for malignancy     Plan:      Will arrange for punch biopsy by bedside tomorrow   I have asked for OR for appropriate instrumentation     Signed By: Carmenza Calixto MD     2019

## 2019-10-26 NOTE — PROGRESS NOTES
TRANSFER - IN REPORT:    Verbal report received from Giovanni RN(name) on Celeste Schwartz  being received from ED(unit) for routine progression of care      Report consisted of patients Situation, Background, Assessment and   Recommendations(SBAR). Information from the following report(s) SBAR, Kardex, ED Summary, Procedure Summary, Intake/Output, MAR, Accordion, Recent Results and Med Rec Status was reviewed with the receiving nurse. Opportunity for questions and clarification was provided. Assessment completed upon patients arrival to unit and care assumed.

## 2019-10-26 NOTE — PROGRESS NOTES
[facility]  [address] [facility], [address] [place] [address]  [phone]      Medical Progress Note      NAME: Andrew Fuchs   :  1940  MRM:  [id number]    Date/Time: 10/26/2019         Subjective:     Chief Complaint:  Patient was seen and examined by me. Chart reviewed. Feeling less weak today. No fevers       Objective:       Vitals:       Last 24hrs VS reviewed since prior progress note. Most recent are:    Visit Vitals  /81 (BP 1 Location: Right arm, BP Patient Position: At rest)   Pulse 68   Temp 98 °F (36.7 °C)   Resp 16   Ht 6' 1\" (1.854 m)   Wt 95.3 kg (210 lb)   SpO2 93%   BMI 27.71 kg/m²     SpO2 Readings from Last 6 Encounters:   10/26/19 93%   19 94%   19 94%   19 94%   18 94%   18 95%    O2 Flow Rate (L/min): 1 l/min       Intake/Output Summary (Last 24 hours) at 10/26/2019 0954  Last data filed at 10/26/2019 0732  Gross per 24 hour   Intake 0 ml   Output 500 ml   Net -500 ml        Exam:     Physical Exam:    Gen:  Elderly, frail, ill-appearing, NAD  HEENT:  Pink conjunctivae, PERRL, hearing intact to voice, moist mucous membranes  Neck:  Supple, without masses, thyroid non-tender  Resp:  No accessory muscle use, some crackles at L base  Card:  No murmurs, normal S1, S2 without thrills, bruits or peripheral edema  Abd:  Soft, non-tender, non-distended, normoactive bowel sounds are present  Musc:  No cyanosis or clubbing, L arm amputation  Skin:  L shoulder with chronic crusting wound.   No fluctuance or induation  Neuro:  Cranial nerves 3-12 are grossly intact, follows commands appropriately  Psych:  fair insight, oriented to person, place and time, alert    Medications Reviewed: (see below)    Lab Data Reviewed: (see below)    ______________________________________________________________________    Medications:     Current Facility-Administered Medications   Medication Dose Route Frequency    potassium phosphate 30 mmol in 0.9% sodium chloride 250 mL infusion   IntraVENous ONCE    sodium chloride (NS) flush 5-10 mL  5-10 mL IntraVENous PRN    metoprolol succinate (TOPROL-XL) XL tablet 12.5 mg  12.5 mg Oral DAILY    simvastatin (ZOCOR) tablet 40 mg  40 mg Oral QHS    tamsulosin (FLOMAX) capsule 0.4 mg  0.4 mg Oral DAILY    sodium chloride (NS) flush 5-40 mL  5-40 mL IntraVENous Q8H    sodium chloride (NS) flush 5-40 mL  5-40 mL IntraVENous PRN    zolpidem (AMBIEN) tablet 5 mg  5 mg Oral QHS PRN    guaiFENesin-dextromethorphan (ROBITUSSIN DM) 100-10 mg/5 mL syrup 10 mL  10 mL Oral Q6H PRN    albuterol-ipratropium (DUO-NEB) 2.5 MG-0.5 MG/3 ML  3 mL Nebulization Q4H PRN    cefTRIAXone (ROCEPHIN) 2 g in 0.9% sodium chloride (MBP/ADV) 50 mL  2 g IntraVENous Q24H    0.9% sodium chloride infusion  75 mL/hr IntraVENous CONTINUOUS          Lab Review:     Recent Labs     10/26/19  0243 10/25/19  1611   WBC 5.2 6.5   HGB 14.2 15.0   HCT 43.2 45.8   PLT 54* 59*     Recent Labs     10/26/19  0243 10/25/19  1611    134*   K 3.7 4.2   * 106   CO2 23 21   * 122*   BUN 15 14   CREA 0.82 0.91   CA 7.8* 8.1*   MG 1.8 1.9   PHOS 2.1*  --    ALB  --  3.6   TBILI  --  0.6   SGOT  --  31   ALT  --  23     No results found for: GLUCPOC       Assessment / Plan:     79 yo hx of HTN, CAD, post-polio syndrome, presented w/ AMS, sepsis, UTI    1) Acute metabolic encephalopathy: due to sepsis, now resolved. Will monitor    2) Sepsis/UTI: sepsis POA, now resolved. Will monitor urine Cx. Change levaquin to IV CTX    3) Possible pneumonia: likely atelectasis rather than PNA at L base. Will check chest CT. Encourage incentive spirometry, nebs prn    4) L shoulder crusting lesion: likely skin malignancy. Awaiting Gen surg to perform bx. Needs outpatient f/u    5) Thrombocytopenia: unclear etiology. Plts was 54. Will consult hematology to eval    6) HTN/CAD: cont BB, statin    7) Post polio syndrome: s/p L arm amputation.   Consult PT/OT    8) Hypophos: replete IV    Total time spent with patient: 35 min                  Care Plan discussed with: Patient, nursing    Discussed:  Care Plan    Prophylaxis:  SCD's    Disposition:  Home w/Family           ___________________________________________________    Attending Physician: Nancie Chapman MD

## 2019-10-26 NOTE — ROUTINE PROCESS
TRANSFER - OUT REPORT: 
 
Verbal report given to Leah on Mary Hidden  being transferred to  [phone] for routine progression of care Report consisted of patients Situation, Background, Assessment and  
Recommendations(SBAR). Information from the following report(s) SBAR, ED Summary, [facility] and Recent Results was reviewed with the receiving nurse. Opportunity for questions and clarification was provided.

## 2019-10-26 NOTE — CONSULTS
Hematology Consultation        Patient: Kelly Leach MRN: [id number]  SSN: [id number]    YOB: 1940  Age: 78 y.o. Sex: male        Subjective:      Kelly Leach is a 78 y.o. male who I am seeing in consultation for thrombocytopenia. He is being treated for UTI empirically. 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 Platelet count has been low for some time. He denies bleeding. Review of Systems:    Constitutional: fatigue  Eyes: negative  Ears, Nose, Mouth, Throat, and Face: negative  Respiratory: negative  Cardiovascular: negative  Gastrointestinal: negative  Genitourinary:negative  Integument/Breast: negative  Hematologic/Lymphatic: negative  Musculoskeletal:negative  Neurological: negative      Past Medical History:   Diagnosis Date    Benign essential HTN     BPH (benign prostatic hyperplasia)     saw urology in [place] CAD in native artery     s/p stent x2. saw Dr. Shana Raygoza in [place]    DNR (do not resuscitate) 4/17/2018    Hx of diverticulitis of colon     Hypercholesterolemia     Melanoma ([facility] [address]     x4. 2013. superficial per pt    Phantom pain ([address]     L arm    Post-polio syndrome age 4    dx 2004 in [place]. He still drives. weakness, R deltoid weakness, s/p L arm amputation, R hip pain, R leg-ankle brace    Renal cyst     8 cm, increasing 20 years    Right hip pain     chronic, polio    Shingles     Thrombocytopenia (HCC)     chronic    Venous insufficiency     s/p venous stripping    Weakness of right leg     Zoster 1982     Past Surgical History:   Procedure Laterality Date    HX AMPUTATION Left 1953    arm, from polio, phantom pain    HX APPENDECTOMY      HX COLONOSCOPY  2008?  HX CORONARY STENT PLACEMENT  2006    HX CORONARY STENT PLACEMENT  2004?     HX HERNIA REPAIR Left     HX TONSILLECTOMY      HX VEIN STRIPPING        Family History   Problem Relation Age of Onset    Diabetes Mother      Social History     Tobacco Use    Smoking status: Never Smoker    Smokeless tobacco: Never Used   Substance Use Topics    Alcohol use: No      Prior to Admission medications    Medication Sig Start Date End Date Taking? Authorizing Provider   ascorbic acid, vitamin C, (VITAMIN C) 1,000 mg tablet Take 1,000 mg by mouth daily. Yes Provider, Historical   metoprolol succinate (TOPROL XL) 25 mg XL tablet Take 12.5 mg by mouth nightly. Yes Provider, Historical   therapeutic multivitamin (THERAGRAN) tablet Take 1 Tab by mouth daily. Yes Provider, Historical   hydroxypropyl methylcellulose (ISOPTO TEARS) 0.5 % ophthalmic solution Administer 1 Drop to both eyes nightly. Yes Provider, Historical   tamsulosin (FLOMAX) 0.4 mg capsule Take 0.4 mg by mouth nightly. Yes Provider, Historical   zolpidem (AMBIEN) 5 mg tablet Take 5 mg by mouth nightly as needed for Sleep.    Yes Provider, Historical   simvastatin (ZOCOR) 40 mg tablet TAKE 1 TABLET BY MOUTH AT BEDTIME 10/5/19  Yes Port, José Manuel Armstrong MD        Current Meds    metoprolol succinate (TOPROL-XL) XL tablet 12.5 mg  tamsulosin (FLOMAX) capsule 0.4 mg  zolpidem (AMBIEN) tablet 5 mg   Current Facility-Administered Medications:     sodium chloride (NS) flush 5-10 mL, 5-10 mL, IntraVENous, PRN, Werner Noyola MD    metoprolol succinate (TOPROL-XL) XL tablet 12.5 mg, 12.5 mg, Oral, DAILY, Jesus Noyola MD, 12.5 mg at 10/26/19 9740    simvastatin (ZOCOR) tablet 40 mg, 40 mg, Oral, QHS, Werner Noyola MD, 40 mg at 10/25/19 2344    tamsulosin (FLOMAX) capsule 0.4 mg, 0.4 mg, Oral, DAILY, Jesus Noyola MD, 0.4 mg at 10/26/19 1838    sodium chloride (NS) flush 5-40 mL, 5-40 mL, IntraVENous, Q8H, Werner Noyola MD, 10 mL at 10/25/19 2222    sodium chloride (NS) flush 5-40 mL, 5-40 mL, IntraVENous, PRN, Werner Noyola MD    zolpidem (AMBIEN) tablet 5 mg, 5 mg, Oral, QHS PRN, Lino Peng MD  [facility] guaiFENesin-dextromethorphan (ROBITUSSIN DM) 100-10 mg/5 mL syrup 10 mL, 10 mL, Oral, Q6H PRN, Jimmie Andrew MD    albuterol-ipratropium (DUO-NEB) 2.5 MG-0.5 MG/3 ML, 3 mL, Nebulization, Q4H PRN, Jimmie Andrew MD    cefTRIAXone (ROCEPHIN) 2 g in 0.9% sodium chloride (MBP/ADV) 50 mL, 2 g, IntraVENous, Q24H, Jimmie Andrew MD, Last Rate: 100 mL/hr at 10/25/19 2344, 2 g at 10/25/19 2344    0.9% sodium chloride infusion, 75 mL/hr, IntraVENous, CONTINUOUS, Jimmie Andrew MD, Last Rate: 75 mL/hr at 10/25/19 2344, 75 mL/hr at 10/25/19 2344      No Known Allergies        Objective:     Vitals:    10/25/19 2353 10/26/19 0405 10/26/19 0800 10/26/19 1112   BP: 137/83 150/81 147/78 151/82   Pulse: 67 68 69 67   Resp: 16 16 18 17   Temp: 98.1 °F (36.7 °C) 98 °F (36.7 °C) 98 °F (36.7 °C) 97.3 °F (36.3 °C)   SpO2: 92% 93% 94% 91%   Weight:       Height:                Physical Exam:    GENERAL: alert, cooperative  EYE: conjunctivae/corneas clear  LYMPHATIC: Cervical, supraclavicular, and axillary nodes normal.   THROAT & NECK: normal and no erythema or exudates noted. LUNG: clear to auscultation bilaterally  HEART: regular rate and rhythm  ABDOMEN: soft, non-tender  EXTREMITIES: left arm amputation  SKIN: Normal.  NEUROLOGIC: AOx3. Detail neurological exam was not performed      Lab Results   Component Value Date/Time    WBC 5.2 10/26/2019 02:43 AM    HGB 14.2 10/26/2019 02:43 AM    HCT 43.2 10/26/2019 02:43 AM    PLATELET 54 (L) 11/80/8334 02:43 AM    MCV 96.9 10/26/2019 02:43 AM                Assessment:     1. Moderate thrombocytopenia:    Could be related to infection or Abx use. Since he has had thrombocytopenia prior to this hospital admission, he could very well have a bone marrow disorder. Recommend observation for now  It should recover with time. Plan:       1. Observation  2. CBC every few days while in the hospital  3.  Out patient follow up with Hematology      Signed by: Yasmin Fuentes MD October 26, 2019

## 2019-10-26 NOTE — PROGRESS NOTES
Bedside and Verbal shift change report given to SOLEDAD Cabezas (oncoming nurse) by Barbara Tomlin RN (offgoing nurse). Report included the following information SBAR, Kardex, ED Summary, Procedure Summary, Intake/Output, MAR, Accordion and Recent Results.

## 2019-10-26 NOTE — PHYSICIAN ADVISORY
Letter of Status Determination: Current Status INPATIENT is Appropriate Pt Name:  Rubi Anderson MR#  [id number] [facility]#   [id number] Room and Hospital  521/01  @ [facility] Hospitalization date  10/25/2019  3:37 PM  
Current Attending Physician  Francoise Andrew MD  
Principal diagnosis  Metabolic Encephalopathy due to Symptomatic UTI. Clinicals  78 y.o. Male with a  PMH significant  CAD, Polio as a child , Hyperlipidemia, melanoma, Hypertension and BPH was brought to the ED for an approximately 2-3 day history of of weakness, low grade fever and occasional cough . Patient also mentioned having a lesion in his L shoulder ongoing for weeks. Initial ED evaluation was consistent with metabolic encephalopathy and a significant symptomatic UTI. [place] MCG criteria Does  NOT apply STATUS DETERMINATION  On the basis of clinical data, available documentaion, we believe that the current status of this patient as INPATIENT is Appropriate. This patient is at high risk of adverse events and deterioration based on documented clinical data, comorbid conditions and current acute care course. The final decision of the patient's hospitalization status depends on the attending physician's judgement. Additional comments Insurance  Payor: [place] MEDICARE / Plan: VA MEDICARE PART A & B / Product Type: Medicare / Insurance Information [facility] PART A & B Phone:   
 Subscriber: [employer] Vargas Subscriber#: [id number] Group#:  Precert#:   
   
 Alan Bernabe Phone:   
 Subscriber: [employer] Subscriber#: [id number] Group#: [id number] Precert#:   
  
 
 
The information in this document is a recommendation to be used for utilization review and utilization management purposes only. This recommendation is not an order.   
The recommendation is made based on the information reviewed at the time of the referral, is pursuant to the [facility] Conditions of Participation (42 CFR Part 482), and is neither a judgment nor an assessment with regard to the appropriateness or quality of clinical care. For all Managed Care patients: The Criteria are intended solely for use as screening guidelines with respect to the medical appropriateness of healthcare services and not for final clinical or payment determinations concerning the type or level of medical care provided, or proposed to be provided, to a patient. They help the reviewers determine whether a patient is appropriate for observation or inpatient admission at the time a decision to admit the patient is being made. All efforts are made to apply the pertinent payor criteria (MCG or [employer]) as well as the clinical judgements based on the information reviewed at the time of the referral.\" Nothing in this document may be used to limit, alter, or affect clinical services provided to the patient named below. Owen Johnson MD [facility] Phys[facility] Advisor [facility] [phone] [email or web address] 
  
12:42 PM 10/26/2019

## 2019-10-26 NOTE — PROGRESS NOTES
[facility]: MED RECONCILIATION      Information obtained from: Patient     Allergies: Patient has no known allergies. Comments:  - Patient states he takes Flomax mostly only 1 capsule a day  and takes only half a tab of Metoprolol every day. Prior to Admission Medications:     Medication Documentation Review Audit       Reviewed by Krys Angelo, PHARMD (Pharmacist) on 10/25/19 at 2240      Medication Sig Documenting Provider Last Dose Status Taking?   ascorbic acid, vitamin C, (VITAMIN C) 1,000 mg tablet Take 1,000 mg by mouth daily. Provider, Historical 10/24/2019 AM Active Yes   hydroxypropyl methylcellulose (ISOPTO TEARS) 0.5 % ophthalmic solution Administer 1 Drop to both eyes nightly. Provider, Historical 10/24/2019 PM Active Yes   metoprolol succinate (TOPROL XL) 25 mg XL tablet Take 12.5 mg by mouth nightly. Provider, Historical 10/24/2019 PM Active Yes   simvastatin (ZOCOR) 40 mg tablet TAKE 1 TABLET BY MOUTH AT BEDTIME Gail Barrow MD 10/24/2019 PM Active Yes   tamsulosin (FLOMAX) 0.4 mg capsule Take 0.4 mg by mouth nightly. Provider, Historical 10/24/2019 PM Active Yes   therapeutic multivitamin (THERAGRAN) tablet Take 1 Tab by mouth daily. Provider, Historical 10/24/2019 AM Active Yes   zolpidem (AMBIEN) 5 mg tablet Take 5 mg by mouth nightly as needed for Sleep.  Provider, Historical  Active Yes                        [address] [place], [place]   Contact: [phone]

## 2019-10-27 VITALS
BODY MASS INDEX: 27.96 KG/M2 | TEMPERATURE: 97.5 F | HEART RATE: 63 BPM | OXYGEN SATURATION: 93 % | DIASTOLIC BLOOD PRESSURE: 83 MMHG | SYSTOLIC BLOOD PRESSURE: 144 MMHG | WEIGHT: 210.98 LBS | RESPIRATION RATE: 17 BRPM | HEIGHT: 73 IN

## 2019-10-27 PROBLEM — B34.8 PARAINFLUENZA INFECTION: Status: ACTIVE | Noted: 2019-10-27

## 2019-10-27 LAB
ANION GAP SERPL CALC-SCNC: 6 MMOL/L (ref 5–15)
ATRIAL RATE: 80 BPM
BUN SERPL-MCNC: 13 MG/DL (ref 6–20)
BUN/CREAT SERPL: 17 (ref 12–20)
CALCIUM SERPL-MCNC: 7.6 MG/DL (ref 8.5–10.1)
CALCULATED P AXIS, ECG09: 58 DEGREES
CALCULATED R AXIS, ECG10: -9 DEGREES
CALCULATED T AXIS, ECG11: 61 DEGREES
CHLORIDE SERPL-SCNC: 111 MMOL/L (ref 97–108)
CO2 SERPL-SCNC: 21 MMOL/L (ref 21–32)
CREAT SERPL-MCNC: 0.76 MG/DL (ref 0.7–1.3)
DIAGNOSIS, 93000: NORMAL
GLUCOSE SERPL-MCNC: 90 MG/DL (ref 65–100)
MAGNESIUM SERPL-MCNC: 1.8 MG/DL (ref 1.6–2.4)
P-R INTERVAL, ECG05: 186 MS
PHOSPHATE SERPL-MCNC: 3 MG/DL (ref 2.6–4.7)
POTASSIUM SERPL-SCNC: 3.8 MMOL/L (ref 3.5–5.1)
Q-T INTERVAL, ECG07: 362 MS
QRS DURATION, ECG06: 100 MS
QTC CALCULATION (BEZET), ECG08: 417 MS
SODIUM SERPL-SCNC: 138 MMOL/L (ref 136–145)
VENTRICULAR RATE, ECG03: 80 BPM

## 2019-10-27 PROCEDURE — 0HBCXZX EXCISION OF LEFT UPPER ARM SKIN, EXTERNAL APPROACH, DIAGNOSTIC: ICD-10-PCS | Performed by: SURGERY

## 2019-10-27 PROCEDURE — 74011250637 HC RX REV CODE- 250/637: Performed by: INTERNAL MEDICINE

## 2019-10-27 PROCEDURE — 36415 COLL VENOUS BLD VENIPUNCTURE: CPT

## 2019-10-27 PROCEDURE — 84100 ASSAY OF PHOSPHORUS: CPT

## 2019-10-27 PROCEDURE — 80048 BASIC METABOLIC PNL TOTAL CA: CPT

## 2019-10-27 PROCEDURE — 83735 ASSAY OF MAGNESIUM: CPT

## 2019-10-27 RX ORDER — CEFDINIR 300 MG/1
300 CAPSULE ORAL 2 TIMES DAILY
Qty: 10 CAP | Refills: 0 | Status: SHIPPED | OUTPATIENT
Start: 2019-10-27 | End: 2019-11-01

## 2019-10-27 RX ADMIN — Medication 10 ML: at 05:51

## 2019-10-27 RX ADMIN — TAMSULOSIN HYDROCHLORIDE 0.4 MG: 0.4 CAPSULE ORAL at 09:22

## 2019-10-27 RX ADMIN — METOPROLOL SUCCINATE 12.5 MG: 25 TABLET, EXTENDED RELEASE ORAL at 09:21

## 2019-10-27 NOTE — DISCHARGE INSTRUCTIONS
HOSPITALIST DISCHARGE INSTRUCTIONS  NAME: Danica Ro   :  1940   MRN:  [id number]     Date/Time:  10/27/2019 9:41 AM    ADMIT DATE: 10/25/2019     DISCHARGE DATE: 10/27/2019     ADMITTING DIAGNOSIS:  UTI, Parainfluenza infection    DISCHARGE DIAGNOSIS:  same    MEDICATIONS:  See after visit summary       · It is important that you take the medication exactly as they are prescribed. · Keep your medication in the bottles provided by the pharmacist and keep a list of the medication names, dosages, and times to be taken in your wallet. · Do not take other medications without consulting your doctor     Pain Management: per above medications    What to do at Home    Recommended diet:  Regular Diet    Recommended activity: Activity as tolerated    1) Return to the hospital if you feel worse    2) If you experience any of the following symptoms then please call your primary care physician or return to the emergency room if you cannot get hold of your doctor:  Fever, chills, nausea, vomiting, diarrhea, change in mentation, falling, bleeding, shortness of breath, chest pain, severe headache, severe abdominal pain,     3) Follow up with a hematologist to evaluate your low platelets count    4) The Parainfluenza infection will improve over time. Take over the counter medications as needed for respiratory symptoms and pain    Follow Up:   Follow-up Information     Follow up With Specialties Details Why Contact Info    Renée Kovacs MD Internal Medicine Schedule an appointment as soon as possible for a visit in 1 week  [address] [place] [address] 31  [address] [facility]  [phone]      Essie Hunt MD Hematology and Oncology, Internal Medicine, Hematology, Oncology Schedule an appointment as soon as possible for a visit in 2 weeks to evaluate low platelets count [address] [facility] [address] Estela Hardy MD General Surgery Schedule an appointment as soon as possible for a visit in 2 weeks for skin biopsy [address]  [address]  [phone]              Information obtained by :  I understand that if any problems occur once I am at home I am to contact my physician. I understand and acknowledge receipt of the instructions indicated above. [de-identified] or R.N.'s Signature                                                                  Date/Time                                                                                                                                              Patient or Representative Signature                                                          Date/Time    Patient Education        Urinary Tract Infections in Men: Care Instructions  Your Care Instructions    A urinary tract infection, or UTI, is a general term for an infection anywhere between the kidneys and the tip of the penis. UTIs can also be a result of a prostate problem. Most cause pain or burning when you urinate. Most UTIs are caused by bacteria and can be cured with antibiotics. It is important to complete your treatment so that the infection does not get worse. Follow-up care is a key part of your treatment and safety. Be sure to make and go to all appointments, and call your doctor if you are having problems. It's also a good idea to know your test results and keep a list of the medicines you take. How can you care for yourself at home? · Take your antibiotics as prescribed. Do not stop taking them just because you feel better. You need to take the full course of antibiotics. · Take your medicines exactly as prescribed. Your doctor may have prescribed a medicine, such as phenazopyridine (Pyridium), to help relieve pain when you urinate. This turns your urine orange.  You may stop taking it when your symptoms get better. But be sure to take all of your antibiotics, which treat the infection. · Drink extra water for the next day or two. This will help make the urine less concentrated and help wash out the bacteria causing the infection. (If you have kidney, heart, or liver disease and have to limit your fluids, talk with your doctor before you increase your fluid intake.)  · Avoid drinks that are carbonated or have caffeine. They can irritate the bladder. · Urinate often. Try to empty your bladder each time. · To relieve pain, take a hot bath or lay a heating pad (set on low) over your lower belly or genital area. Never go to sleep with a heating pad in place. To help prevent UTIs  · Drink plenty of fluids, enough so that your urine is light yellow or clear like water. If you have kidney, heart, or liver disease and have to limit fluids, talk with your doctor before you increase the amount of fluids you drink. · Urinate when you have the urge. Do not hold your urine for a long time. Urinate before you go to sleep. · Keep your penis clean. Catheter care  If you have a drainage tube (catheter) in place, the following steps will help you care for it. · Always wash your hands before and after touching your catheter. · Check the area around the urethra for inflammation or signs of infection. Signs of infection include irritated, swollen, red, or tender skin, or pus around the catheter. · Clean the area around the catheter with soap and water two times a day. Dry with a clean towel afterward. · Do not apply powder or lotion to the skin around the catheter. To empty the urine collection bag  · Wash your hands with soap and water. · Without touching the drain spout, remove the spout from its sleeve at the bottom of the collection bag. Open the valve on the spout. · Let the urine flow out of the bag and into the toilet or a container. Do not let the tubing or drain spout touch anything.   · After you empty the bag, clean the end of the drain spout with tissue and water. Close the valve and put the drain spout back into its sleeve at the bottom of the collection bag. · Wash your hands with soap and water. When should you call for help? Call your doctor now or seek immediate medical care if:    · Symptoms such as a fever, chills, nausea, or vomiting get worse or happen for the first time.     · You have new pain in your back just below your rib cage. This is called flank pain.     · There is new blood or pus in your urine.     · You are not able to take or keep down your antibiotics.    Watch closely for changes in your health, and be sure to contact your doctor if:    · You are not getting better after taking an antibiotic for 2 days.     · Your symptoms go away but then come back. Where can you learn more? Go to http://melanie-leandro.info/. Enter L878 in the search box to learn more about \"Urinary Tract Infections in Men: Care Instructions. \"  Current as of: December 19, 2018  Content Version: 12.2  © 5045-6856 [employer]. Care instructions adapted under license by [employer] (which disclaims liability or warranty for this information). If you have questions about a medical condition or this instruction, always ask your healthcare professional. [address] any warranty or liability for your use of this information.

## 2019-10-27 NOTE — DISCHARGE SUMMARY
[facility] [place] [facility]  [address]  [phone]    Physician Discharge Summary     Patient ID:  Haja Alvarado  [id number]  13 y.o.  1940    Admit date: 10/25/2019    Discharge date and time: 10/27/2019    Admission Diagnoses: UTI (urinary tract infection) [N39.0]  HTN (hypertension) [I10]    Discharge Diagnoses:  Principal Diagnosis UTI (urinary tract infection)                                            Principal Problem:    UTI (urinary tract infection) (3/16/2019)    Active Problems:    HTN (hypertension) (10/25/2019)      Thrombocytopenia ([address] (10/26/2019)      Parainfluenza infection (10/27/2019)           Hospital Course:     79 yo hx of HTN, CAD, post-polio syndrome, presented w/ AMS, sepsis, UTI, Parainfluenza infection     1) Acute metabolic encephalopathy: due to sepsis, now resolved. Will monitor     2) Sepsis/UTI: sepsis POA, now resolved. Urine Cx with gram neg rods. Patient improved with IV CTX. Will complete a course of omnicef     3) Parainfluenza infection: seen on viral panel. Should be self limiting. No pneumonia on chest CT     4) L shoulder crusting lesion: likely skin malignancy. Gen surg was to perform biopsy, but patient wants this done as an outpatient. Needs outpatient f/u     5) Thrombocytopenia: unclear etiology. Plts was 54. Hematology was following, needs outpt f/u     6) HTN/CAD: cont BB, statin     7) Post polio syndrome: s/p L arm amputation.   cont PT/OT     8) Hypophos: repleted IV    PCP: Mala Mendoza MD     Consults: Hematology, Gen surg    Significant Diagnostic Studies: none    Discharge Exam:  Physical Exam:    Gen:  Elderly, frail, ill-appearing, NAD  HEENT:  Pink conjunctivae, PERRL, hearing intact to voice, moist mucous membranes  Neck:  Supple, without masses, thyroid non-tender  Resp:  No accessory muscle use, some crackles at L base  Card:  No murmurs, normal S1, S2 without thrills, bruits or peripheral edema  Abd:  Soft, non-tender, non-distended, normoactive bowel sounds are present  Musc:  No cyanosis or clubbing, L arm amputation  Skin:  L shoulder with chronic crusting wound. No fluctuance or induation  Neuro:  Cranial nerves 3-12 are grossly intact, follows commands appropriately  Psych:  fair insight, oriented to person, place and time, alert    Disposition: home  Discharge Condition: Stable    Patient Instructions:   Current Discharge Medication List      START taking these medications    Details   cefdinir (OMNICEF) 300 mg capsule Take 1 Cap by mouth two (2) times a day for 5 days. Qty: 10 Cap, Refills: 0         CONTINUE these medications which have NOT CHANGED    Details   ascorbic acid, vitamin C, (VITAMIN C) 1,000 mg tablet Take 1,000 mg by mouth daily. metoprolol succinate (TOPROL XL) 25 mg XL tablet Take 12.5 mg by mouth nightly. therapeutic multivitamin (THERAGRAN) tablet Take 1 Tab by mouth daily. hydroxypropyl methylcellulose (ISOPTO TEARS) 0.5 % ophthalmic solution Administer 1 Drop to both eyes nightly. tamsulosin (FLOMAX) 0.4 mg capsule Take 0.4 mg by mouth nightly.       zolpidem (AMBIEN) 5 mg tablet Take 5 mg by mouth nightly as needed for Sleep.      simvastatin (ZOCOR) 40 mg tablet TAKE 1 TABLET BY MOUTH AT BEDTIME  Qty: 90 Tab, Refills: 1    Associated Diagnoses: Hypercholesterolemia           Activity: Activity as tolerated  Diet: Regular Diet  Wound Care: As directed    Follow-up with  Follow-up Information     Follow up With Specialties Details Why Contact Info    Tushar Mendez MD Internal Medicine Schedule an appointment as soon as possible for a visit in 1 week  [address] [place] [address] 31  1007 [facility]  [phone]      Pretty Hunt MD Hematology and Oncology, Internal Medicine, Hematology, Oncology Schedule an appointment as soon as possible for a visit in 2 weeks to evaluate low platelets count [id number] [address] [facility] [address] [facility]  [phone]      Rock Aissatou MD General Surgery Schedule an appointment as soon as possible for a visit in 2 weeks for skin biopsy [address]  [phone]            Follow-up tests/labs: biopsy of left shoulder lesion    Signed:  Rea Del Rio MD  10/27/2019  9:42 AM    I spent 34 min on discharge

## 2019-10-27 NOTE — PROGRESS NOTES
CMS Note  10/27/2019    Patient received and signed their 2nd IM letter. Patient was given a copy for their record.   Deandre Hernandez CMS

## 2019-10-27 NOTE — PROGRESS NOTES
Bedside and Verbal shift change report given to Keisha Romero RN (oncoming nurse) by Evelyn Barron RN (offgoing nurse). Report included the following information SBAR, Kardex, MAR and Accordion.

## 2019-10-27 NOTE — PROGRESS NOTES
Morning labs drawn. Able to obtain enough blood for his electrolytes but did not have enough blood for his CBC. Patient refused to have a second attempt be made.

## 2019-10-28 ENCOUNTER — PATIENT OUTREACH (OUTPATIENT)
Dept: FAMILY MEDICINE CLINIC | Age: 79
End: 2019-10-28

## 2019-10-28 LAB
BACTERIA SPEC CULT: ABNORMAL
CC UR VC: ABNORMAL
SERVICE CMNT-IMP: ABNORMAL

## 2019-10-28 NOTE — PROGRESS NOTES
Hospital Discharge Follow-Up      Date/Time:  10/29/2019 12:33 PM    Patient was admitted to [facility] on 10/25/19 and discharged on 10/27/19 for UTI. The physician discharge summary was available at the time of outreach. Patient was contacted within one business day of discharge. Initial outreach attempt unsuccessful    Top Challenges reviewed with the provider   Platelet 54  L shoulder crusting lesion concerning for malignancy. Surgeon recommended skin biopsy. Patient deferred, wants to discuss with PCP first  Advance Care Planning:   Does patient have an Advance Directive:  reviewed and current        Method of communication with provider :staff message    Inpatient RRAT score: 9  Was this a readmission? no   Patient stated reason for the readmission: FINA    Care Transition Nurse (CTN) contacted the patient by telephone to perform post hospital discharge assessment. Verified name and  with patient as identifiers. Provided introduction to self, and explanation of the CTN role. Patient received hospital discharge instructions. CTN reviewed discharge instructions and red flags with patient who verbalized understanding. Patient given an opportunity to ask questions and does not have any further questions or concerns at this time. The patient agrees to contact the PCP office for questions related to their healthcare. CTN provided contact information for future reference. Disease Specific:   N/A    Patients top risk factors for readmission:  transportation- relies on [facility] for transportation with one week advance notice. Durable Medical Equipment ordered at discharge: none    Medication(s):   New Medications at Discharge:cefdinir 300 mg capsule (OMNICEF)   Changed Medications at Discharge: NA  Discontinued Medications at Discharge: NA    Medication reconciliation was performed with patient, who verbalizes understanding of administration of home medications.   There were no barriers to obtaining medications identified at this time. Referral to Pharm D needed: no     Current Outpatient Medications   Medication Sig    guaiFENesin ER (MUCINEX) 600 mg ER tablet Take 600 mg by mouth two (2) times a day.  cefdinir (OMNICEF) 300 mg capsule Take 1 Cap by mouth two (2) times a day for 5 days.  ascorbic acid, vitamin C, (VITAMIN C) 1,000 mg tablet Take 1,000 mg by mouth daily.  metoprolol succinate (TOPROL XL) 25 mg XL tablet Take 12.5 mg by mouth nightly.  therapeutic multivitamin (THERAGRAN) tablet Take 1 Tab by mouth daily.  hydroxypropyl methylcellulose (ISOPTO TEARS) 0.5 % ophthalmic solution Administer 1 Drop to both eyes nightly.  tamsulosin (FLOMAX) 0.4 mg capsule Take 0.4 mg by mouth nightly.  zolpidem (AMBIEN) 5 mg tablet Take 5 mg by mouth nightly as needed for Sleep.  simvastatin (ZOCOR) 40 mg tablet TAKE 1 TABLET BY MOUTH AT BEDTIME     No current facility-administered medications for this visit. There are no discontinued medications. [facility] follow up appointment(s):   Future Appointments   Date Time Provider Deon Quintero   11/11/2019 11:20 AM Rick Chavarria MD [address]      Non-[facility] follow up appointment(s): deferred follow up appt with Dr. Mary Jane Mckinnon for skin biopsy after discussion with PCP  [facility]:  n/a       Goals      Attend follow up appointments on schedule      10/29/19  · Will attend follow up appt with pcp on 11/11  · Deferred f/u with Dr. Mary Jane Mckinnon for skin biopsy at this time. Wants to discuss with PCP first  Job Donis RN  Care Transitions Nurse       Knowledge and adherence of prescribed medication (ie. action, side effects, missed dose, etc.).      3/18/19 taking prescribed ABX until completed - TSB     10/29/19  · Will complete antibiotic therapy  Job Donis RN  Care Transitions Nurse       Understands red flags post discharge.       10/29/19  · Reviewed s/sx of UTI  · Denies dysuria, burning, no blood  · Reports voiding \"slowly\"  · Reviewed the importance of hydration  · Denies chest pain, shortness of breath  · Reports feeling tired  Eugune Leyden, RN  Care Transitions Nurse

## 2019-10-29 RX ORDER — GUAIFENESIN 600 MG/1
600 TABLET, EXTENDED RELEASE ORAL 2 TIMES DAILY
COMMUNITY
End: 2020-08-27

## 2019-10-30 LAB
BACTERIA SPEC CULT: NORMAL
SERVICE CMNT-IMP: NORMAL

## 2019-10-31 NOTE — CDMP QUERY
Patient admitted with UTI. There is noted documentation of Metabolic Encephalopathy throughout the chart including the discharge summary. Please provide clinical indicators/treatment to support this diagnosis. ? Metabolic Encephalopathy after further study ruled out ? Metabolic Encephalopathy POA (please include clinical indictors to support) ? Other explanation (please specify) ? Unable to determine The medical record reflects the following:   
Risk Factors: UTI Clinical Indicators:  
10/26 PN Acute metabolic encephalopathy: due to sepsis, now resolved. Will monitor Pt. was noted to have lethargy but was A&O x3. No head CT was done Treatment: None documented Thank you Dora Díaz, JOSE RN, RN, [address] [place] [address] Supervisor -ED [facility], [facility]

## 2019-10-31 NOTE — CDMP QUERY
Patient admitted with UTI. There is noted documentation of Sepsis in the ED and throughout the chart including the discharge summary. Please document in progress notes and the discharge summary the clinical indicators (such as the ones below) to support this diagnosis or if Sepsis may have been ruled out after study. ? Sepsis POA (please include clinical indicators) ? Sepsis Ruled out 
? Other explanation (please specify) ? Unable to determine The medical record reflects the following: 
Risk Factors: UTI Clinical Indicators:  
ED:  
78-year-old male presents with fever and tachypnea with notable urinary tract infection concerning for developing sepsis. He has felt progressively weak over the last week and had been treated previously for a soft tissue infection of the left shoulder with Keflex. Source is most likely urinary at this point and due to recent cephalosporin treatment Levaquin was ordered for complicated UTI therapy pending culture data. Lactic acid was normal and no evidence of end organ damage currently. Temp: 99.6, 100.8 on admission afebrile throughout the rest of the stay Resp: 14, 22 on admission, did not go above 18 throughout the rest of the stay WBC: 6.2/5.2 LA: 0.8 Treatment:  
Levaquin, Zithromax, Rocephin, Fluid bolus At least 2 SIRS Criteria (Systemic Inflammatory Response Syndrome) (CMS) 
-Temp > 100.9 or < 96.8 
-HR > 90 
-RR > 20  
-WBC > 12,000 or < 4,000 or > 10% bands Thank you Govind Webster, JOSE RN, RN, [address] Supervisor [phone] [facility]

## 2019-11-11 ENCOUNTER — OFFICE VISIT (OUTPATIENT)
Dept: INTERNAL MEDICINE CLINIC | Age: 79
End: 2019-11-11

## 2019-11-11 VITALS
DIASTOLIC BLOOD PRESSURE: 78 MMHG | HEIGHT: 73 IN | TEMPERATURE: 97.4 F | BODY MASS INDEX: 28.1 KG/M2 | OXYGEN SATURATION: 90 % | HEART RATE: 72 BPM | SYSTOLIC BLOOD PRESSURE: 134 MMHG | WEIGHT: 212 LBS | RESPIRATION RATE: 18 BRPM

## 2019-11-11 DIAGNOSIS — Z86.19 HISTORY OF PARAINFLUENZA: ICD-10-CM

## 2019-11-11 DIAGNOSIS — D69.6 THROMBOCYTOPENIA (HCC): ICD-10-CM

## 2019-11-11 DIAGNOSIS — N39.0 URINARY TRACT INFECTION DUE TO KLEBSIELLA SPECIES: Primary | ICD-10-CM

## 2019-11-11 DIAGNOSIS — Z85.820 HISTORY OF MELANOMA: ICD-10-CM

## 2019-11-11 DIAGNOSIS — B96.89 URINARY TRACT INFECTION DUE TO KLEBSIELLA SPECIES: Primary | ICD-10-CM

## 2019-11-11 DIAGNOSIS — G54.6 PHANTOM PAIN (HCC): ICD-10-CM

## 2019-11-11 DIAGNOSIS — M75.92 LESION OF LEFT SHOULDER: ICD-10-CM

## 2019-11-11 DIAGNOSIS — R32 URINARY INCONTINENCE, UNSPECIFIED TYPE: ICD-10-CM

## 2019-11-11 LAB
BILIRUB UR QL STRIP: NEGATIVE
GLUCOSE UR-MCNC: NEGATIVE MG/DL
KETONES P FAST UR STRIP-MCNC: NEGATIVE MG/DL
PH UR STRIP: 6 [PH] (ref 4.6–8)
PROT UR QL STRIP: NEGATIVE
SP GR UR STRIP: 1 (ref 1–1.03)
UA UROBILINOGEN AMB POC: NORMAL (ref 0.2–1)
URINALYSIS CLARITY POC: CLEAR
URINALYSIS COLOR POC: YELLOW
URINE BLOOD POC: NEGATIVE
URINE LEUKOCYTES POC: NEGATIVE
URINE NITRITES POC: NEGATIVE

## 2019-11-11 RX ORDER — ACETAMINOPHEN AND CODEINE PHOSPHATE 300; 30 MG/1; MG/1
1 TABLET ORAL
Qty: 30 TAB | Refills: 0 | Status: SHIPPED | OUTPATIENT
Start: 2019-11-11 | End: 2019-12-11

## 2019-11-12 ENCOUNTER — PATIENT OUTREACH (OUTPATIENT)
Dept: FAMILY MEDICINE CLINIC | Age: 79
End: 2019-11-12

## 2019-11-12 NOTE — PROGRESS NOTES
HISTORY OF PRESENT ILLNESS    Chief Complaint   Patient presents with   [facility] Follow Up     stayed two nights, was seen at [facility], 10/25-10/27, UTI and para influenza Dx    Other     hospital sapna low platelet count, and sore on stump of left arm not healing       Presents for Transitional care management (TCM) visit s/p of hospital admission from 10/15 until 10/27/2019 at [facility]    Nurse Navigator call noted to patient and documented in [address] on 10/28/19. Hospital record and relevant lab results, test results and consult notes have personally been reviewed by me at this office visit. Medications reviewed and reconciled. Patient was hospitalized for sepsis secondary to klebsiella UTI, but also tested positive for parainfluenza. Treated w IV CTX and then cefdinir on d/c. Reports mild urinary incontinence, but denies f/c, dysuria. Mild cough persists. Denies sig SOB. Was noted to have suspicious lesion of left shoulder which is not healing  Declined biopsy in hospital.        Review of Systems   All other systems reviewed and are negative, except as noted in HPI    Past Medical and Surgical History   has a past medical history of Benign essential HTN, BPH (benign prostatic hyperplasia), CAD in native artery, DNR (do not resuscitate) (4/17/2018), diverticulitis of colon, Hypercholesterolemia, Melanoma ([address] Phantom pain ([address] Post-polio syndrome (age 4), Renal cyst, Right hip pain, Shingles, Thrombocytopenia ([address] Venous insufficiency, Weakness of right leg, and Zoster (1982). 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 reports that he has never smoked. He has never used smokeless tobacco. He reports that he does not drink alcohol or use drugs.   family history includes Diabetes in his mother. Physical Exam   Nursing note and vitals reviewed. Blood pressure 134/78, pulse 72, temperature 97.4 °F (36.3 °C), temperature source Oral, resp. rate 18, height 6' 1\" (1.854 m), weight 212 lb (96.2 kg), SpO2 90 %. Constitutional:  No distress. Eyes: Conjunctivae are normal.   Ears:  Hearing grossly intact  Cardiovascular: Normal rate. regular rhythm, no murmurs or gallops  No edema  Pulmonary/Chest: Effort normal.   CTAB  Musculoskeletal: moves all 3 ext. Left shoulder stump present  Left clavicular region w 3 cm nodule with rolled borders and central scab  Neurological: Alert and oriented to person, place, and time. Skin: No rash noted. Psychiatric: Normal mood and affect. Behavior is normal.     ASSESSMENT and PLAN  Diagnoses and all orders for this visit:    1. Urinary tract infection due to Klebsiella species  -     AMB POC URINALYSIS DIP STICK AUTO W/O MICRO  2. Urinary incontinence, unspecified type  UA is benign. Consider urology f/u    3. 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 Not likely melanoma. Needs surgical excision. He wishes to consult derm first.  -     REFERRAL TO DERMATOLOGY    4. History of melanoma  -     REFERRAL TO DERMATOLOGY    5. Thrombocytopenia ([facility] [address] - chronic, and assumed benign over the years, but worse in hospital while ill. Recommend Hematology eval if persists. May need work-up  Repeat cbc 1-2 months. No hx bleeding    6. History of parainfluenza  Mild improving s/s    7. Phantom pain (HCC)  -     acetaminophen-codeine (TYLENOL #3) 300-30 mg per tablet; Take 1 Tab by mouth every six (6) hours as needed for Pain for up to 30 days. Max Daily Amount: 4 Tabs.         There are no Patient Instructions on file for this visit.    lab results and schedule of future lab studies reviewed with patient  reviewed medications and side effects in detail    Return to clinic for further evaluation if new symptoms develop    Follow-up and Dispositions    · Return in about 1 month (around 12/11/2019) for platelets. Current Outpatient Medications   Medication Sig    acetaminophen-codeine (TYLENOL #3) 300-30 mg per tablet Take 1 Tab by mouth every six (6) hours as needed for Pain for up to 30 days. Max Daily Amount: 4 Tabs.  guaiFENesin ER (MUCINEX) 600 mg ER tablet Take 600 mg by mouth two (2) times a day.  ascorbic acid, vitamin C, (VITAMIN C) 1,000 mg tablet Take 1,000 mg by mouth daily.  metoprolol succinate (TOPROL XL) 25 mg XL tablet Take 12.5 mg by mouth nightly.  therapeutic multivitamin (THERAGRAN) tablet Take 1 Tab by mouth daily.  hydroxypropyl methylcellulose (ISOPTO TEARS) 0.5 % ophthalmic solution Administer 1 Drop to both eyes nightly.  tamsulosin (FLOMAX) 0.4 mg capsule Take 0.4 mg by mouth nightly.  zolpidem (AMBIEN) 5 mg tablet Take 5 mg by mouth nightly as needed for Sleep.  simvastatin (ZOCOR) 40 mg tablet TAKE 1 TABLET BY MOUTH AT BEDTIME     No current facility-administered medications for this visit.

## 2019-11-12 NOTE — PROGRESS NOTES
Chart reviewed. Goals      Attend follow up appointments on schedule      10/29/19  · Will attend follow up appt with pcp on 11/11  · Deferred f/u with Dr. Shawn Canavan for skin biopsy at this time. Wants to discuss with PCP first  Soila Soto RN  Care Transitions Nurse    11/12/19  · Attended [facility] appt  · Referral for dermatology noted for left shoulder lesion evaluation  Soila Soto RN  Care Transitions Nurse       Knowledge and adherence of prescribed medication (ie. action, side effects, missed dose, etc.).      3/18/19 taking prescribed ABX until completed - TSB     10/29/19  · Will complete antibiotic therapy  Soila Soto RN  Care Transitions Nurse       Understands red flags post discharge.       10/29/19  · Reviewed s/sx of UTI  · Denies dysuria, burning, no blood  · Reports voiding \"slowly\"  · Reviewed the importance of hydration  · Denies chest pain, shortness of breath  · Reports feeling tired  Soila Soto RN  Care Transitions Nurse

## 2019-12-10 RX ORDER — METOPROLOL SUCCINATE 25 MG/1
TABLET, EXTENDED RELEASE ORAL
Qty: 90 TAB | Refills: 1 | Status: SHIPPED | OUTPATIENT
Start: 2019-12-10 | End: 2020-06-16

## 2019-12-13 ENCOUNTER — PATIENT OUTREACH (OUTPATIENT)
Dept: FAMILY MEDICINE CLINIC | Age: 79
End: 2019-12-13

## 2020-01-10 ENCOUNTER — PATIENT OUTREACH (OUTPATIENT)
Dept: FAMILY MEDICINE CLINIC | Age: 80
End: 2020-01-10

## 2020-01-10 NOTE — PROGRESS NOTES
Called patient to follow up   Reports no UTI symptoms  States he is now focused on lesion to left shoulder  Reports he saw [facility] and had biopsy completed approximately two weeks ago and is now waiting for surgery to reach out for surgical intervention. CTN offered to contact surgeon's office to follow up.  Patient declined  States he has called and is waiting for return call  He appreciated the follow up call

## 2020-01-28 ENCOUNTER — PATIENT OUTREACH (OUTPATIENT)
Dept: FAMILY MEDICINE CLINIC | Age: 80
End: 2020-01-28

## 2020-01-28 NOTE — PROGRESS NOTES
Called patient  Reports L shoulder lesion surgery scheduled next Monday with Dr. Jerilyn Adrian  CTN wished him well    Patient has graduated from the Disease Specific Care Management  program on 1/28/2020. Patient/family has the ability to self-manage at this time Care management goals have been completed. Patient was not referred to the [facility] team for further management. Goals Addressed                 This Visit's Progress     COMPLETED: Attend follow up appointments on schedule        10/29/19  · Will attend follow up appt with pcp on 11/11  · Deferred f/u with Dr. Debra Wheat for skin biopsy at this time. Wants to discuss with PCP first  Fabricio Shirley RN  Care Transitions Nurse    11/12/19  · Attended [facility] appt  · Referral for dermatology noted for left shoulder lesion evaluation  Fabricio Shirley RN  Care Transitions Nurse       COMPLETED: Knowledge and adherence of prescribed medication (ie. action, side effects, missed dose, etc.).        3/18/19 taking prescribed ABX until completed - TSB     10/29/19  · Will complete antibiotic therapy  Fabricio Shirley RN  Care Transitions Nurse       COMPLETED: Understands red flags post discharge. 10/29/19  · Reviewed s/sx of UTI  · Denies dysuria, burning, no blood  · Reports voiding \"slowly\"  · Reviewed the importance of hydration  · Denies chest pain, shortness of breath  · Reports feeling tired  Fabricio Shirley RN  Care Transitions Nurse            Patient has Care Transition Nurse's contact information for any further questions, concerns, or needs. Patients upcoming visits:  No future appointments.

## 2020-03-04 DIAGNOSIS — G54.6 PHANTOM PAIN (HCC): Primary | ICD-10-CM

## 2020-03-04 RX ORDER — ACETAMINOPHEN AND CODEINE PHOSPHATE 300; 30 MG/1; MG/1
TABLET ORAL
Qty: 30 TAB | Refills: 0 | Status: SHIPPED | OUTPATIENT
Start: 2020-03-04 | End: 2020-05-26

## 2020-05-21 DIAGNOSIS — E78.00 HYPERCHOLESTEROLEMIA: ICD-10-CM

## 2020-05-21 RX ORDER — SIMVASTATIN 40 MG/1
TABLET, FILM COATED ORAL
Qty: 90 TAB | Refills: 1 | Status: SHIPPED | OUTPATIENT
Start: 2020-05-21 | End: 2020-09-08

## 2020-05-26 DIAGNOSIS — G54.6 PHANTOM PAIN (HCC): ICD-10-CM

## 2020-05-26 RX ORDER — ACETAMINOPHEN AND CODEINE PHOSPHATE 300; 30 MG/1; MG/1
TABLET ORAL
Qty: 30 TAB | Refills: 0 | Status: SHIPPED | OUTPATIENT
Start: 2020-05-26 | End: 2020-07-27

## 2020-06-16 RX ORDER — METOPROLOL SUCCINATE 25 MG/1
TABLET, EXTENDED RELEASE ORAL
Qty: 90 TAB | Refills: 1 | Status: SHIPPED | OUTPATIENT
Start: 2020-06-16 | End: 2021-01-04

## 2020-07-27 DIAGNOSIS — G54.6 PHANTOM PAIN (HCC): ICD-10-CM

## 2020-07-27 RX ORDER — ACETAMINOPHEN AND CODEINE PHOSPHATE 300; 30 MG/1; MG/1
TABLET ORAL
Qty: 30 TAB | Refills: 3 | Status: SHIPPED | OUTPATIENT
Start: 2020-07-27 | End: 2020-08-27

## 2020-08-18 ENCOUNTER — VIRTUAL VISIT (OUTPATIENT)
Dept: INTERNAL MEDICINE CLINIC | Age: 80
End: 2020-08-18
Payer: MEDICARE

## 2020-08-18 DIAGNOSIS — L30.9 DERMATITIS: ICD-10-CM

## 2020-08-18 DIAGNOSIS — R21 RASH: Primary | ICD-10-CM

## 2020-08-18 DIAGNOSIS — D69.6 THROMBOCYTOPENIA (HCC): ICD-10-CM

## 2020-08-18 DIAGNOSIS — I10 ESSENTIAL HYPERTENSION: ICD-10-CM

## 2020-08-18 PROCEDURE — G8756 NO BP MEASURE DOC: HCPCS | Performed by: INTERNAL MEDICINE

## 2020-08-18 PROCEDURE — 1101F PT FALLS ASSESS-DOCD LE1/YR: CPT | Performed by: INTERNAL MEDICINE

## 2020-08-18 PROCEDURE — G8419 CALC BMI OUT NRM PARAM NOF/U: HCPCS | Performed by: INTERNAL MEDICINE

## 2020-08-18 PROCEDURE — G8427 DOCREV CUR MEDS BY ELIG CLIN: HCPCS | Performed by: INTERNAL MEDICINE

## 2020-08-18 PROCEDURE — G8536 NO DOC ELDER MAL SCRN: HCPCS | Performed by: INTERNAL MEDICINE

## 2020-08-18 PROCEDURE — 99214 OFFICE O/P EST MOD 30 MIN: CPT | Performed by: INTERNAL MEDICINE

## 2020-08-18 PROCEDURE — G8510 SCR DEP NEG, NO PLAN REQD: HCPCS | Performed by: INTERNAL MEDICINE

## 2020-08-18 RX ORDER — PREDNISONE 20 MG/1
TABLET ORAL
Qty: 14 TAB | Refills: 0 | Status: ON HOLD | OUTPATIENT
Start: 2020-08-18 | End: 2021-01-25

## 2020-08-18 NOTE — PROGRESS NOTES
Mary Guadalupe was seen on 8/18/2020 using synchronous (real-time) audio-video technology; doxy. me. Consent: Mary Guadalupe, who was seen by synchronous (real-time) audio-video technology, and/or his healthcare decision maker, is aware that this patient-initiated, Telehealth encounter on 8/18/2020 is a billable service, with coverage as determined by his insurance carrier. He is aware that he may receive a bill and has provided verbal consent to proceed: Yes. I was in the office while conducting this encounter. Mary Guadalupe is a 78 y.o. male who presents today for Skin Problem  . 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 Today patient is being seen for an acute visit. he does not have other concerns. Problem visit:  Mary Guadalupe is here for complaint of raised skin lesions to arms and legs. Problem began 6 week(s) ago. Severity is moderate  Itching. No fevers/chills. No pain to the areas. Using baby lotion with some help. Low Platelets: Noted while he was hospitalized. This is not been followed back up upon. Notes that his blood pressure is usually pretty well controlled. ROS  Review of Systems   Constitutional: Negative for chills, fever and weight loss. HENT: Negative for hearing loss and tinnitus. Respiratory: Negative for cough and shortness of breath.     Cardiovascular: Negative for chest pain, palpitations and leg swelling. Gastrointestinal: Negative for abdominal pain, nausea and vomiting. Genitourinary: Negative for dysuria and urgency. Musculoskeletal: Negative for myalgias. Skin: Positive for itching and rash. Neurological: Negative. Endo/Heme/Allergies: Negative for environmental allergies. Does not bruise/bleed easily. Psychiatric/Behavioral: Negative for depression. The patient is not nervous/anxious. There were no vitals taken for this visit. Patient-Reported Vitals 8/18/2020   Patient-Reported Weight 220lbs   Patient-Reported Height -   Patient-Reported Pulse -   Patient-Reported Temperature -   Patient-Reported Systolic  -   Patient-Reported Diastolic -        Physical Exam  Constitutional:       Appearance: Normal appearance. HENT:      Head: Normocephalic and atraumatic. Pulmonary:      Effort: Pulmonary effort is normal.   Skin:     Comments: Raised red lesions to arms and legs. Appear irritated. Patient denies that they appear warm. Reports they are mildly elevated but not plaque-like. Neurological:      General: No focal deficit present. Mental Status: He is alert. Psychiatric:         Mood and Affect: Mood normal.         Behavior: Behavior normal.         Thought Content: Thought content normal.           Current Outpatient Medications   Medication Sig    acetaminophen-codeine (TYLENOL #3) 300-30 mg per tablet TAKE 1 TABLET BY MOUTH EVERY 6 HOURS AS NEEDED FOR PAIN    metoprolol succinate (TOPROL-XL) 25 mg XL tablet TAKE 1 TABLET BY MOUTH EVERY DAY    simvastatin (ZOCOR) 40 mg tablet TAKE 1 TABLET BY MOUTH AT BEDTIME    ascorbic acid, vitamin C, (VITAMIN C) 1,000 mg tablet Take 1,000 mg by mouth daily.  therapeutic multivitamin (THERAGRAN) tablet Take 1 Tab by mouth daily.  hydroxypropyl methylcellulose (ISOPTO TEARS) 0.5 % ophthalmic solution Administer 1 Drop to both eyes nightly.  tamsulosin (FLOMAX) 0.4 mg capsule Take 0.4 mg by mouth nightly.     zolpidem (AMBIEN) 5 mg tablet Take 5 mg by mouth nightly as needed for Sleep.  guaiFENesin ER (MUCINEX) 600 mg ER tablet Take 600 mg by mouth two (2) times a day. No current facility-administered medications for this visit. Past Medical History:   Diagnosis Date    Benign essential HTN     BPH (benign prostatic hyperplasia)     saw [place]rology in [place] CAD in native artery     s/p stent x2. saw Dr. Bharathi Maria in [place]    DNR (do not resuscitate) 4/17/2018    Hx of diverticulitis of colon     Hypercholesterolemia     Melanoma ([facility] [address]     x4. one present on L shoulder stump. 2013. superficial per pt    Phantom pain ([facility] [address]     L arm    Post-polio syndrome age 4    dx 2004 in [place]. He still drives. 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  HX CORONARY STENT PLACEMENT  2006    HX CORONARY STENT PLACEMENT  2004?  HX HERNIA REPAIR Left     HX TONSILLECTOMY      HX VEIN STRIPPING        Social History     Tobacco Use    Smoking status: Never Smoker    Smokeless tobacco: Never Used   Substance Use Topics    Alcohol use: No      Family History   Problem Relation Age of Onset    Diabetes Mother         No Known Allergies     Assessment/Plan  Diagnoses and all orders for this visit:    1. Rash-appears to be contact dermatitis. Given difficulty to diagnosis over video conference will place on prednisone but will follow up with him closely in the office.  -     predniSONE (DELTASONE) 20 mg tablet;  Take two tablets for 4 days, then one for 4 days, then 1/2 for 4 days then stop. 2. Dermatitis  -     predniSONE (DELTASONE) 20 mg tablet; Take two tablets for 4 days, then one for 4 days, then 1/2 for 4 days then stop. 3. Thrombocytopenia (HCC)-we will need blood work repeated    4. Essential hypertension-reports that this is been relatively well controlled. Jael Norton is a 78 y.o. male being evaluated by a video visit encounter for concerns as above. A caregiver was present when appropriate. Due to this being a TeleHealth encounter (During TDOHX-48 public health emergency), evaluation of the following organ systems was limited: Vitals/Constitutional/EENT/Resp/CV/GI//MS/Neuro/Skin/Heme-Lymph-Imm. Pursuant to the emergency declaration under the [facility], 1135 waiver authority and the [employer] and [employer] Act, this Virtual  Visit was conducted, with patient's (and/or legal guardian's) consent, to reduce the patient's risk of exposure to COVID-19 and provide necessary medical care. Services were provided through a video synchronous discussion virtually to substitute for in-person clinic visit. Patient and provider were located at their individual homes. Madelin Lynne MD  8/18/2020    This note was created with the help of speech recognition software Saleem Mathew) and may contain some 'sound alike' errors.

## 2020-08-27 ENCOUNTER — OFFICE VISIT (OUTPATIENT)
Dept: INTERNAL MEDICINE CLINIC | Age: 80
End: 2020-08-27
Payer: MEDICARE

## 2020-08-27 VITALS
HEART RATE: 81 BPM | HEIGHT: 73 IN | WEIGHT: 224.3 LBS | BODY MASS INDEX: 29.73 KG/M2 | TEMPERATURE: 97.8 F | SYSTOLIC BLOOD PRESSURE: 132 MMHG | RESPIRATION RATE: 14 BRPM | DIASTOLIC BLOOD PRESSURE: 78 MMHG | OXYGEN SATURATION: 94 %

## 2020-08-27 DIAGNOSIS — I10 ESSENTIAL HYPERTENSION: Primary | ICD-10-CM

## 2020-08-27 DIAGNOSIS — D69.6 THROMBOCYTOPENIA (HCC): ICD-10-CM

## 2020-08-27 DIAGNOSIS — L30.9 DERMATITIS: ICD-10-CM

## 2020-08-27 DIAGNOSIS — G14 POST-POLIO SYNDROME: ICD-10-CM

## 2020-08-27 LAB
ANION GAP SERPL CALC-SCNC: 6 MMOL/L (ref 5–15)
BASOPHILS # BLD: 0 K/UL (ref 0–0.1)
BASOPHILS NFR BLD: 0 % (ref 0–1)
BUN SERPL-MCNC: 22 MG/DL (ref 6–20)
BUN/CREAT SERPL: 19 (ref 12–20)
CALCIUM SERPL-MCNC: 8.7 MG/DL (ref 8.5–10.1)
CHLORIDE SERPL-SCNC: 109 MMOL/L (ref 97–108)
CO2 SERPL-SCNC: 26 MMOL/L (ref 21–32)
CREAT SERPL-MCNC: 1.17 MG/DL (ref 0.7–1.3)
DIFFERENTIAL METHOD BLD: ABNORMAL
EOSINOPHIL # BLD: 0 K/UL (ref 0–0.4)
EOSINOPHIL NFR BLD: 0 % (ref 0–7)
ERYTHROCYTE [DISTWIDTH] IN BLOOD BY AUTOMATED COUNT: 17.2 % (ref 11.5–14.5)
GLUCOSE SERPL-MCNC: 102 MG/DL (ref 65–100)
HCT VFR BLD AUTO: 46.9 % (ref 36.6–50.3)
HGB BLD-MCNC: 14.6 G/DL (ref 12.1–17)
IMM GRANULOCYTES # BLD AUTO: 0.1 K/UL (ref 0–0.04)
IMM GRANULOCYTES NFR BLD AUTO: 1 % (ref 0–0.5)
LYMPHOCYTES # BLD: 0.7 K/UL (ref 0.8–3.5)
LYMPHOCYTES NFR BLD: 9 % (ref 12–49)
MCH RBC QN AUTO: 31.5 PG (ref 26–34)
MCHC RBC AUTO-ENTMCNC: 31.1 G/DL (ref 30–36.5)
MCV RBC AUTO: 101.1 FL (ref 80–99)
MONOCYTES # BLD: 0.4 K/UL (ref 0–1)
MONOCYTES NFR BLD: 5 % (ref 5–13)
NEUTS SEG # BLD: 6.4 K/UL (ref 1.8–8)
NEUTS SEG NFR BLD: 85 % (ref 32–75)
NRBC # BLD: 0 K/UL (ref 0–0.01)
NRBC BLD-RTO: 0 PER 100 WBC
PLATELET # BLD AUTO: 100 K/UL (ref 150–400)
POTASSIUM SERPL-SCNC: 4.2 MMOL/L (ref 3.5–5.1)
RBC # BLD AUTO: 4.64 M/UL (ref 4.1–5.7)
RBC MORPH BLD: ABNORMAL
SODIUM SERPL-SCNC: 141 MMOL/L (ref 136–145)
WBC # BLD AUTO: 7.6 K/UL (ref 4.1–11.1)

## 2020-08-27 PROCEDURE — G8510 SCR DEP NEG, NO PLAN REQD: HCPCS | Performed by: INTERNAL MEDICINE

## 2020-08-27 PROCEDURE — 99214 OFFICE O/P EST MOD 30 MIN: CPT | Performed by: INTERNAL MEDICINE

## 2020-08-27 PROCEDURE — G8754 DIAS BP LESS 90: HCPCS | Performed by: INTERNAL MEDICINE

## 2020-08-27 PROCEDURE — 1100F PTFALLS ASSESS-DOCD GE2>/YR: CPT | Performed by: INTERNAL MEDICINE

## 2020-08-27 PROCEDURE — G8536 NO DOC ELDER MAL SCRN: HCPCS | Performed by: INTERNAL MEDICINE

## 2020-08-27 PROCEDURE — G8752 SYS BP LESS 140: HCPCS | Performed by: INTERNAL MEDICINE

## 2020-08-27 PROCEDURE — G0463 HOSPITAL OUTPT CLINIC VISIT: HCPCS | Performed by: INTERNAL MEDICINE

## 2020-08-27 PROCEDURE — G8419 CALC BMI OUT NRM PARAM NOF/U: HCPCS | Performed by: INTERNAL MEDICINE

## 2020-08-27 PROCEDURE — G8427 DOCREV CUR MEDS BY ELIG CLIN: HCPCS | Performed by: INTERNAL MEDICINE

## 2020-08-27 PROCEDURE — 3288F FALL RISK ASSESSMENT DOCD: CPT | Performed by: INTERNAL MEDICINE

## 2020-08-27 RX ORDER — TRIAMCINOLONE ACETONIDE 1 MG/G
CREAM TOPICAL 2 TIMES DAILY
Qty: 15 G | Refills: 0 | Status: SHIPPED | OUTPATIENT
Start: 2020-08-27 | End: 2020-09-08

## 2020-08-27 NOTE — PROGRESS NOTES
Leticia Villarreal is a 78 y.o. male who presents today for Thrombocytopenia  . 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 Today patient is here for f/u. Rash: Seen virtually last week due to rash. Given description and appearance it appeared reactive in nature. We placed him on steroids. Since he reports great improvements. He will finish up the oral steroids and will provide him with topical. Still has a couple areas that are itchy. Not sure what caused this dermatitis. Thrombocytopenia: Patient was found cytopenia while hospitalized last year for UTI. He had previously had low platelets. Hematology was consulted who recommended outpatient repeating of labs. Will repeat this today. Hypertension  Hypertension ROS: taking medications as instructed, no medication side effects noted, no TIA's, no chest pain on exertion, no dyspnea on exertion, no swelling of ankles     reports that he has never smoked. He has never used smokeless tobacco.    reports no history of alcohol use. BP Readings from Last 2 Encounters:   08/27/20 132/78   11/11/19 134/78     He asks about adding vitamin B12 for his chronic fatigue that he attributes to post polio syndrome. We discussed that this would be safe for him to do    ROS  Review of Systems   Constitutional: Positive for malaise/fatigue (chronic). Negative for chills, fever and weight loss.    HENT: Negative for congestion and sore throat. Eyes: Negative for blurred vision, double vision and photophobia. Respiratory: Negative for cough and shortness of breath. Cardiovascular: Negative for chest pain, palpitations and leg swelling. Gastrointestinal: Negative for abdominal pain, constipation, diarrhea, heartburn, nausea and vomiting. Genitourinary: Negative for dysuria, frequency and urgency. Musculoskeletal: Negative for joint pain and myalgias. Skin: Positive for itching and rash. Much better   Neurological: Negative. Negative for headaches. Endo/Heme/Allergies: Does not bruise/bleed easily. Psychiatric/Behavioral: Negative for memory loss and suicidal ideas. Visit Vitals  /78 (BP 1 Location: Right arm, BP Patient Position: Sitting)   Pulse 81   Temp 97.8 °F (36.6 °C)   Resp 14   Ht 6' 1\" (1.854 m)   Wt 224 lb 4.8 oz (101.7 kg)   SpO2 94%   BMI 29.59 kg/m²       Physical Exam  Constitutional:       Appearance: He is well-developed. He is not diaphoretic. HENT:      Head: Normocephalic and atraumatic. Eyes:      Pupils: Pupils are equal, round, and reactive to light. Cardiovascular:      Rate and Rhythm: Normal rate and regular rhythm. Heart sounds: No murmur. Pulmonary:      Effort: Pulmonary effort is normal. No respiratory distress. Breath sounds: Normal breath sounds. Skin:     General: Skin is warm and dry. Comments: Red lesions to legs and arms greatly improved. Chronically dry skin. Neurological:      Mental Status: He is alert and oriented to person, place, and time. Psychiatric:         Behavior: Behavior normal.           Current Outpatient Medications   Medication Sig    triamcinolone acetonide (KENALOG) 0.1 % topical cream Apply  to affected area two (2) times a day. use thin layer    predniSONE (DELTASONE) 20 mg tablet Take two tablets for 4 days, then one for 4 days, then 1/2 for 4 days then stop.     acetaminophen-codeine (TYLENOL #3) 300-30 mg per tablet TAKE 1 TABLET BY MOUTH EVERY 6 HOURS AS NEEDED FOR PAIN    metoprolol succinate (TOPROL-XL) 25 mg XL tablet TAKE 1 TABLET BY MOUTH EVERY DAY    simvastatin (ZOCOR) 40 mg tablet TAKE 1 TABLET BY MOUTH AT BEDTIME    ascorbic acid, vitamin C, (VITAMIN C) 1,000 mg tablet Take 1,000 mg by mouth daily.  therapeutic multivitamin (THERAGRAN) tablet Take 1 Tab by mouth daily.  hydroxypropyl methylcellulose (ISOPTO TEARS) 0.5 % ophthalmic solution Administer 1 Drop to both eyes nightly.  tamsulosin (FLOMAX) 0.4 mg capsule Take 0.4 mg by mouth nightly.  zolpidem (AMBIEN) 5 mg tablet Take 5 mg by mouth nightly as needed for Sleep. No current facility-administered medications for this visit. Past Medical History:   Diagnosis Date    Benign essential HTN     BPH (benign prostatic hyperplasia)     saw urology in [place] CAD in native artery     s/p stent x2. saw Dr. Waldemar Hernandez in [place]    DNR (do not resuscitate) 4/17/2018    Hx of diverticulitis of colon     Hypercholesterolemia     Melanoma ([facility] [address]     x4. one present on L shoulder stump. 2013. superficial per pt    Phantom pain ([address]     L arm    Post-polio syndrome age 4    dx 2004 in [place]. He still drives. 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  HX CORONARY STENT PLACEMENT  2006    HX CORONARY STENT PLACEMENT  2004?     HX HERNIA REPAIR Left     HX TONSILLECTOMY      HX VEIN STRIPPING        Social History     Tobacco Use    Smoking status: Never Smoker    Smokeless tobacco: Never Used   Substance Use Topics    Alcohol use: No      Family History   Problem Relation Age of Onset    Diabetes Mother         No Known Allergies     Assessment/Plan  Diagnoses and all orders for this visit:    1. Essential hypertension-stable  -     METABOLIC PANEL, BASIC; Future  -     CBC WITH AUTOMATED DIFF; Future    2. Thrombocytopenia (HCC)-repeat  -     METABOLIC PANEL, BASIC; Future  -     CBC WITH AUTOMATED DIFF; Future    3. Dermatitis-greatly improved. Patient to use PRN topical triamcinolone-  -     triamcinolone acetonide (KENALOG) 0.1 % topical cream; Apply  to affected area two (2) times a day. use thin layer    4. Post-polio syndrome-we discussed that it was safe for him to take vitamin [address], [place]  8/27/2020    This note was created with the help of speech recognition software Yessy Montenegro) and may contain some 'sound alike' errors.

## 2020-09-08 DIAGNOSIS — L30.9 DERMATITIS: ICD-10-CM

## 2020-09-08 DIAGNOSIS — E78.00 HYPERCHOLESTEROLEMIA: ICD-10-CM

## 2020-09-08 RX ORDER — SIMVASTATIN 40 MG/1
TABLET, FILM COATED ORAL
Qty: 90 TAB | Refills: 1 | Status: SHIPPED | OUTPATIENT
Start: 2020-09-08 | End: 2021-04-05

## 2020-09-08 RX ORDER — TRIAMCINOLONE ACETONIDE 1 MG/G
CREAM TOPICAL 2 TIMES DAILY
Qty: 15 G | Refills: 0 | Status: SHIPPED | OUTPATIENT
Start: 2020-09-08 | End: 2020-09-27

## 2020-09-27 DIAGNOSIS — L30.9 DERMATITIS: ICD-10-CM

## 2020-09-27 RX ORDER — TRIAMCINOLONE ACETONIDE 1 MG/G
CREAM TOPICAL 2 TIMES DAILY
Qty: 15 G | Refills: 0 | Status: SHIPPED | OUTPATIENT
Start: 2020-09-27 | End: 2020-10-10

## 2020-10-10 DIAGNOSIS — L30.9 DERMATITIS: ICD-10-CM

## 2020-10-10 RX ORDER — TRIAMCINOLONE ACETONIDE 1 MG/G
CREAM TOPICAL 2 TIMES DAILY
Qty: 15 G | Refills: 0 | Status: SHIPPED | OUTPATIENT
Start: 2020-10-10 | End: 2020-11-01 | Stop reason: SDUPTHER

## 2020-10-11 RX ORDER — TAMSULOSIN HYDROCHLORIDE 0.4 MG/1
CAPSULE ORAL
Qty: 180 CAP | Refills: 1 | Status: SHIPPED | OUTPATIENT
Start: 2020-10-11 | End: 2021-02-28 | Stop reason: SDUPTHER

## 2020-11-17 DIAGNOSIS — L30.9 DERMATITIS: ICD-10-CM

## 2020-11-17 RX ORDER — TRIAMCINOLONE ACETONIDE 1 MG/G
CREAM TOPICAL 2 TIMES DAILY
Qty: 15 G | Refills: 3 | Status: SHIPPED | OUTPATIENT
Start: 2020-11-17 | End: 2020-11-27

## 2020-12-07 DIAGNOSIS — L30.9 DERMATITIS: ICD-10-CM

## 2020-12-07 RX ORDER — TRIAMCINOLONE ACETONIDE 1 MG/G
CREAM TOPICAL
Qty: 30 G | Refills: 5 | Status: SHIPPED | OUTPATIENT
Start: 2020-12-07 | End: 2021-07-28 | Stop reason: SDUPTHER

## 2021-01-04 RX ORDER — METOPROLOL SUCCINATE 25 MG/1
TABLET, EXTENDED RELEASE ORAL
Qty: 90 TAB | Refills: 1 | Status: ON HOLD | OUTPATIENT
Start: 2021-01-04 | End: 2021-01-25

## 2021-01-24 ENCOUNTER — APPOINTMENT (OUTPATIENT)
Dept: ULTRASOUND IMAGING | Age: 81
DRG: 728 | End: 2021-01-24
Attending: EMERGENCY MEDICINE
Payer: MEDICARE

## 2021-01-24 ENCOUNTER — HOSPITAL ENCOUNTER (INPATIENT)
Age: 81
LOS: 2 days | Discharge: HOME | DRG: 728 | End: 2021-01-26
Attending: EMERGENCY MEDICINE | Admitting: INTERNAL MEDICINE
Payer: MEDICARE

## 2021-01-24 DIAGNOSIS — N45.3 EPIDIDYMOORCHITIS: Primary | ICD-10-CM

## 2021-01-24 PROBLEM — N45.2 ORCHITIS OF RIGHT TESTICLE: Status: ACTIVE | Noted: 2021-01-24

## 2021-01-24 LAB
ALBUMIN SERPL-MCNC: 3.2 G/DL (ref 3.5–5)
ALBUMIN/GLOB SERPL: 0.8 {RATIO} (ref 1.1–2.2)
ALP SERPL-CCNC: 73 U/L (ref 45–117)
ALT SERPL-CCNC: 31 U/L (ref 12–78)
ANION GAP SERPL CALC-SCNC: 7 MMOL/L (ref 5–15)
APPEARANCE UR: CLEAR
AST SERPL-CCNC: 28 U/L (ref 15–37)
BACTERIA URNS QL MICRO: NEGATIVE /HPF
BASOPHILS # BLD: 0 K/UL (ref 0–0.1)
BASOPHILS NFR BLD: 0 % (ref 0–1)
BILIRUB SERPL-MCNC: 0.5 MG/DL (ref 0.2–1)
BILIRUB UR QL: NEGATIVE
BUN SERPL-MCNC: 13 MG/DL (ref 6–20)
BUN/CREAT SERPL: 13 (ref 12–20)
CALCIUM SERPL-MCNC: 8.6 MG/DL (ref 8.5–10.1)
CHLORIDE SERPL-SCNC: 108 MMOL/L (ref 97–108)
CO2 SERPL-SCNC: 24 MMOL/L (ref 21–32)
COLOR UR: ABNORMAL
COMMENT, HOLDF: NORMAL
CREAT SERPL-MCNC: 0.98 MG/DL (ref 0.7–1.3)
DIFFERENTIAL METHOD BLD: ABNORMAL
EOSINOPHIL # BLD: 0 K/UL (ref 0–0.4)
EOSINOPHIL NFR BLD: 0 % (ref 0–7)
EPITH CASTS URNS QL MICRO: ABNORMAL /LPF
ERYTHROCYTE [DISTWIDTH] IN BLOOD BY AUTOMATED COUNT: 17.4 % (ref 11.5–14.5)
GLOBULIN SER CALC-MCNC: 3.9 G/DL (ref 2–4)
GLUCOSE SERPL-MCNC: 82 MG/DL (ref 65–100)
GLUCOSE UR STRIP.AUTO-MCNC: NEGATIVE MG/DL
HCT VFR BLD AUTO: 39.2 % (ref 36.6–50.3)
HGB BLD-MCNC: 12.4 G/DL (ref 12.1–17)
HGB UR QL STRIP: ABNORMAL
HYALINE CASTS URNS QL MICRO: ABNORMAL /LPF (ref 0–5)
IMM GRANULOCYTES # BLD AUTO: 0 K/UL (ref 0–0.04)
IMM GRANULOCYTES NFR BLD AUTO: 0 % (ref 0–0.5)
KETONES UR QL STRIP.AUTO: NEGATIVE MG/DL
LEUKOCYTE ESTERASE UR QL STRIP.AUTO: ABNORMAL
LYMPHOCYTES # BLD: 0.7 K/UL (ref 0.8–3.5)
LYMPHOCYTES NFR BLD: 10 % (ref 12–49)
MCH RBC QN AUTO: 29.8 PG (ref 26–34)
MCHC RBC AUTO-ENTMCNC: 31.6 G/DL (ref 30–36.5)
MCV RBC AUTO: 94.2 FL (ref 80–99)
MONOCYTES # BLD: 0.4 K/UL (ref 0–1)
MONOCYTES NFR BLD: 6 % (ref 5–13)
NEUTS SEG # BLD: 5.9 K/UL (ref 1.8–8)
NEUTS SEG NFR BLD: 84 % (ref 32–75)
NITRITE UR QL STRIP.AUTO: NEGATIVE
NRBC # BLD: 0 K/UL (ref 0–0.01)
NRBC BLD-RTO: 0 PER 100 WBC
PH UR STRIP: 6 [PH] (ref 5–8)
PLATELET # BLD AUTO: 131 K/UL (ref 150–400)
PLATELET COMMENTS,PCOM: ABNORMAL
PMV BLD AUTO: 11 FL (ref 8.9–12.9)
POTASSIUM SERPL-SCNC: 3.4 MMOL/L (ref 3.5–5.1)
PROT SERPL-MCNC: 7.1 G/DL (ref 6.4–8.2)
PROT UR STRIP-MCNC: NEGATIVE MG/DL
RBC # BLD AUTO: 4.16 M/UL (ref 4.1–5.7)
RBC #/AREA URNS HPF: >100 /HPF (ref 0–5)
RBC MORPH BLD: ABNORMAL
SAMPLES BEING HELD,HOLD: NORMAL
SODIUM SERPL-SCNC: 139 MMOL/L (ref 136–145)
SP GR UR REFRACTOMETRY: 1.01 (ref 1–1.03)
UA: UC IF INDICATED,UAUC: ABNORMAL
UROBILINOGEN UR QL STRIP.AUTO: 0.2 EU/DL (ref 0.2–1)
WBC # BLD AUTO: 7 K/UL (ref 4.1–11.1)
WBC MORPH BLD: ABNORMAL
WBC URNS QL MICRO: ABNORMAL /HPF (ref 0–4)

## 2021-01-24 PROCEDURE — 85025 COMPLETE CBC W/AUTO DIFF WBC: CPT

## 2021-01-24 PROCEDURE — 76870 US EXAM SCROTUM: CPT

## 2021-01-24 PROCEDURE — P9612 CATHETERIZE FOR URINE SPEC: HCPCS

## 2021-01-24 PROCEDURE — 74011000250 HC RX REV CODE- 250: Performed by: INTERNAL MEDICINE

## 2021-01-24 PROCEDURE — 51798 US URINE CAPACITY MEASURE: CPT

## 2021-01-24 PROCEDURE — 80053 COMPREHEN METABOLIC PANEL: CPT

## 2021-01-24 PROCEDURE — 99285 EMERGENCY DEPT VISIT HI MDM: CPT

## 2021-01-24 PROCEDURE — 74011250636 HC RX REV CODE- 250/636: Performed by: INTERNAL MEDICINE

## 2021-01-24 PROCEDURE — 65270000029 HC RM PRIVATE

## 2021-01-24 PROCEDURE — 36415 COLL VENOUS BLD VENIPUNCTURE: CPT

## 2021-01-24 PROCEDURE — 87086 URINE CULTURE/COLONY COUNT: CPT

## 2021-01-24 PROCEDURE — 74011250637 HC RX REV CODE- 250/637: Performed by: INTERNAL MEDICINE

## 2021-01-24 PROCEDURE — 81001 URINALYSIS AUTO W/SCOPE: CPT

## 2021-01-24 RX ORDER — METOPROLOL SUCCINATE 25 MG/1
25 TABLET, EXTENDED RELEASE ORAL DAILY
Status: DISCONTINUED | OUTPATIENT
Start: 2021-01-25 | End: 2021-01-26 | Stop reason: HOSPADM

## 2021-01-24 RX ORDER — MORPHINE SULFATE 2 MG/ML
1 INJECTION, SOLUTION INTRAMUSCULAR; INTRAVENOUS
Status: DISCONTINUED | OUTPATIENT
Start: 2021-01-24 | End: 2021-01-25

## 2021-01-24 RX ORDER — ENOXAPARIN SODIUM 100 MG/ML
40 INJECTION SUBCUTANEOUS DAILY
Status: DISCONTINUED | OUTPATIENT
Start: 2021-01-25 | End: 2021-01-26 | Stop reason: HOSPADM

## 2021-01-24 RX ORDER — ACETAMINOPHEN 650 MG/1
650 SUPPOSITORY RECTAL
Status: DISCONTINUED | OUTPATIENT
Start: 2021-01-24 | End: 2021-01-26 | Stop reason: HOSPADM

## 2021-01-24 RX ORDER — VANCOMYCIN/0.9 % SOD CHLORIDE 1.5G/250ML
1500 PLASTIC BAG, INJECTION (ML) INTRAVENOUS EVERY 12 HOURS
Status: DISCONTINUED | OUTPATIENT
Start: 2021-01-25 | End: 2021-01-26

## 2021-01-24 RX ORDER — ATORVASTATIN CALCIUM 20 MG/1
20 TABLET, FILM COATED ORAL DAILY
Status: DISCONTINUED | OUTPATIENT
Start: 2021-01-25 | End: 2021-01-26 | Stop reason: HOSPADM

## 2021-01-24 RX ORDER — ONDANSETRON 4 MG/1
4 TABLET, ORALLY DISINTEGRATING ORAL
Status: DISCONTINUED | OUTPATIENT
Start: 2021-01-24 | End: 2021-01-26 | Stop reason: HOSPADM

## 2021-01-24 RX ORDER — ACETAMINOPHEN 325 MG/1
650 TABLET ORAL
Status: DISCONTINUED | OUTPATIENT
Start: 2021-01-24 | End: 2021-01-26 | Stop reason: HOSPADM

## 2021-01-24 RX ORDER — TAMSULOSIN HYDROCHLORIDE 0.4 MG/1
0.4 CAPSULE ORAL DAILY
Status: DISCONTINUED | OUTPATIENT
Start: 2021-01-25 | End: 2021-01-26 | Stop reason: HOSPADM

## 2021-01-24 RX ORDER — ONDANSETRON 2 MG/ML
4 INJECTION INTRAMUSCULAR; INTRAVENOUS
Status: DISCONTINUED | OUTPATIENT
Start: 2021-01-24 | End: 2021-01-26 | Stop reason: HOSPADM

## 2021-01-24 RX ORDER — POLYETHYLENE GLYCOL 3350 17 G/17G
17 POWDER, FOR SOLUTION ORAL DAILY PRN
Status: DISCONTINUED | OUTPATIENT
Start: 2021-01-24 | End: 2021-01-26 | Stop reason: HOSPADM

## 2021-01-24 RX ORDER — FAMOTIDINE 20 MG/1
20 TABLET, FILM COATED ORAL 2 TIMES DAILY
Status: DISCONTINUED | OUTPATIENT
Start: 2021-01-24 | End: 2021-01-26 | Stop reason: HOSPADM

## 2021-01-24 RX ORDER — DEXTROSE, SODIUM CHLORIDE, AND POTASSIUM CHLORIDE 5; .45; .15 G/100ML; G/100ML; G/100ML
75 INJECTION INTRAVENOUS CONTINUOUS
Status: DISCONTINUED | OUTPATIENT
Start: 2021-01-24 | End: 2021-01-26

## 2021-01-24 RX ADMIN — VANCOMYCIN HYDROCHLORIDE 2000 MG: 10 INJECTION, POWDER, LYOPHILIZED, FOR SOLUTION INTRAVENOUS at 15:05

## 2021-01-24 RX ADMIN — CEFEPIME HYDROCHLORIDE 1 G: 1 INJECTION, POWDER, FOR SOLUTION INTRAMUSCULAR; INTRAVENOUS at 12:03

## 2021-01-24 RX ADMIN — FAMOTIDINE 20 MG: 20 TABLET, FILM COATED ORAL at 17:12

## 2021-01-24 RX ADMIN — CEFEPIME HYDROCHLORIDE 1 G: 1 INJECTION, POWDER, FOR SOLUTION INTRAMUSCULAR; INTRAVENOUS at 21:44

## 2021-01-24 RX ADMIN — MORPHINE SULFATE 1 MG: 2 INJECTION, SOLUTION INTRAMUSCULAR; INTRAVENOUS at 21:45

## 2021-01-24 RX ADMIN — MORPHINE SULFATE 1 MG: 2 INJECTION, SOLUTION INTRAMUSCULAR; INTRAVENOUS at 16:26

## 2021-01-24 RX ADMIN — POTASSIUM CHLORIDE, DEXTROSE MONOHYDRATE AND SODIUM CHLORIDE 75 ML/HR: 150; 5; 450 INJECTION, SOLUTION INTRAVENOUS at 17:12

## 2021-01-24 NOTE — CONSULTS
New Urology Consult Note      Service Providing Consult: Urology      Assessment:    Sury Velasquez is a 78 y.o. male with right orchitis refractory to levaquin, BPH. Recommendations:  -- Broaden antibiotics. Vanc/cefepime appropriate as discussed with Dr. Brabara Eller. -- Post void residual bladder scans. If retaining may need Hansen  -- Continue Flomax  -- Ice  -- Scrotal elevation  -- Serial exams. We'll continue to follow. -- If an outside urine culture was performed it would be ideal to have those records. Thank you for this consult. Please contact [facility] with any further questions/concerns. Severiano Saldivar MD      HPI -     Reason for Consult:  Orchitis refractory to levofloxacin    Sury Velasquez is seen in consultation for reasons noted above at the request of Parish Alves MD.    This is a 78 y.o. male with a history of BPH on Flomax, CAD, diverticulitis melanoma, post polio syndrome, renal cyst disease, chronic thrombocytopenia who presented to the [facility] emergency department today with right orchitis refractory to Levaquin. He started having symptoms about 5 days ago with right testicular swelling and discomfort, temperature to 101. He was placed on Levaquin empirically. He has not responded to this and continues to have worsening symptoms. He felt feverish last night. No nausea or vomiting. Does feel like he is having some trouble emptying his bladder. He admits that he has not used tamsulosin regularly. Has not been seen by [facility] prior to this. Was previously followed by a urologist in [place].       Past Medical History:  Past Medical History:   Diagnosis Date    BPH (benign prostatic hyperplasia)     saw urology in [place] CAD in native artery     s/p stent x2. saw Dr. Javier Snell in [facility], [place]    DNR (do not resuscitate) 4/17/2018    HTN (hypertension)     Hx of diverticulitis of colon     Hypercholesterolemia     Melanoma ([facility] [address]     x4. one present on L shoulder stump. 2013. superficial per pt    Phantom pain ([address]     L arm    Post-polio syndrome age 4    dx 2004 in [place]. He still drives. 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  HX CORONARY STENT PLACEMENT  2006    HX CORONARY STENT PLACEMENT  2004?  HX HERNIA REPAIR Left     HX TONSILLECTOMY      HX VEIN STRIPPING         Medication:  Current Facility-Administered Medications   Medication Dose Route Frequency Provider Last Rate Last Admin    cefepime (MAXIPIME) 1 g in sterile water (preservative free) 10 mL IV syringe  1 g IntraVENous Q8H Michelle Lares MD   1 g at 01/24/21 1203    vancomycin (VANCOCIN) 1500 mg in  ml infusion  1,500 mg IntraVENous Q24H Michelle Lares MD         Current Outpatient Medications   Medication Sig Dispense Refill    metoprolol succinate (TOPROL-XL) 25 mg XL tablet TAKE 1 TABLET BY MOUTH EVERY DAY 90 Tab 1    triamcinolone acetonide (KENALOG) 0.1 % topical cream Apply  to affected area two (2) times daily as needed for Skin Irritation. use thin layer 30 g 5    tamsulosin (FLOMAX) 0.4 mg capsule TAKE 2 CAPSULES BY MOUTH EVERY  Cap 1    simvastatin (ZOCOR) 40 mg tablet TAKE 1 TABLET BY MOUTH EVERYDAY AT BEDTIME 90 Tab 1    predniSONE (DELTASONE) 20 mg tablet Take two tablets for 4 days, then one for 4 days, then 1/2 for 4 days then stop. 14 Tab 0    ascorbic acid, vitamin C, (VITAMIN C) 1,000 mg tablet Take 1,000 mg by mouth daily.  therapeutic multivitamin (THERAGRAN) tablet Take 1 Tab by mouth daily.  hydroxypropyl methylcellulose (ISOPTO TEARS) 0.5 % ophthalmic solution Administer 1 Drop to both eyes nightly.  zolpidem (AMBIEN) 5 mg tablet Take 5 mg by mouth nightly as needed for Sleep. Allergies:  No Known Allergies    Social History:  Social History     Tobacco Use    Smoking status: Never Smoker    Smokeless tobacco: Never Used   Substance Use Topics    Alcohol use: No    Drug use: Never       Family History  Family History   Problem Relation Age of Onset    Diabetes Mother        Review of Systems  10 systems were reviewed and are negative except as noted specifically in the HPI.     Objective     Vital signs in last 24 hours:  Visit Vitals  BP (!) 145/86   Pulse 84   Temp 98.9 °F (37.2 °C)   Resp 19   SpO2 96%       Intake/Output last 3 shifts:  Date 01/23/21 0700 - 01/24/21 0659(Not Admitted) 01/24/21 0700 - 01/25/21 0659   Shift 5401-0340 3147-0972 24 Hour Total 6095-2608 5630-8916 24 Hour Total   INTAKE   Shift Total         OUTPUT   Urine    200  200     Urine    200  200   Shift Total    200  200   NET    -200  -200   Weight (kg)               Physical Exam  General: NAD, A&O, resting, appropriate  HEENT: NC/AT, EOMI, MMM  Pulmonary: Normal work of breathing on RA  Cardiovascular: Regular rate & rhythm, HDS, adequate peripheral perfusion  Abdomen: soft, NTTP, nondistended, no suprapubic fullness or tenderness  : right scrotal and testicular induration, edema, TTP, no Hansen in place  YANETH: deferred  Extremities: warm and well perfused, no edema  Neuro: Appropriate, no focal neurological deficits    Most Recent Labs:  Lab Results   Component Value Date/Time    WBC 7.0 01/24/2021 08:50 AM    HGB 12.4 01/24/2021 08:50 AM    HCT 39.2 01/24/2021 08:50 AM    PLATELET 653 (L) 88/00/4567 08:50 AM    MCV 94.2 01/24/2021 08:50 AM        Lab Results   Component Value Date/Time    Sodium 139 01/24/2021 08:50 AM    Potassium 3.4 (L) 01/24/2021 08:50 AM    Chloride 108 01/24/2021 08:50 AM    CO2 24 01/24/2021 08:50 AM    Anion gap 7 01/24/2021 08:50 AM    Glucose 82 01/24/2021 08:50 AM    BUN 13 01/24/2021 08:50 AM    Creatinine 0.98 01/24/2021 08:50 AM    BUN/Creatinine ratio 13 01/24/2021 08:50 AM    GFR est AA >60 01/24/2021 08:50 AM    GFR est non-AA >60 01/24/2021 08:50 AM    Calcium 8.6 01/24/2021 08:50 AM    Bilirubin, total 0.5 01/24/2021 08:50 AM    Alk. phosphatase 73 01/24/2021 08:50 AM    Protein, total 7.1 01/24/2021 08:50 AM    Albumin 3.2 (L) 01/24/2021 08:50 AM    Globulin 3.9 01/24/2021 08:50 AM    A-G Ratio 0.8 (L) 01/24/2021 08:50 AM    ALT (SGPT) 31 01/24/2021 08:50 AM        Lab Results   Component Value Date/Time    Prostate Specific Ag 2.3 07/24/2017 11:58 AM    PSA, External 2.2 10/10/2015 09:38 AM        COAGS:  No results found for: APTT, PTP, INR, INREXT    No results found for: HBA1C, HGBE8, HUM6BDEB, EYL1CFQK     Lab Results   Component Value Date/Time     (H) 03/16/2019 12:49 PM    Troponin-I, Qt. <0.05 10/25/2019 04:11 PM          Urine/Blood Cultures:  Results     Procedure Component Value Units Date/Time    CULTURE, URINE [[id number]]     Order Status: Canceled Specimen: Cath Urine     CULTURE, URINE [[id number]] Collected: 01/24/21 0850    Order Status: Completed Specimen: Cath Urine Updated: 01/24/21 1201             IMAGING:  Us Scrotum/testicles    Result Date: 1/24/2021  EXAM: US SCROTUM/TESTICLES INDICATION: Right scrotal swelling. COMPARISON: None. TECHNIQUE: Real-time sonography of the scrotum was performed with a high frequency linear transducer. Multiple static images were obtained. Color Doppler evaluation was also performed. FINDINGS: RIGHT TESTICLE: The right testicle is normal in size and echotexture with increased color-flow. The right testis measures 4.7 x 3.6 x 2.7 cm and the right testicular volume is 23.9 mL. A simple right hydrocele is present. RIGHT EPIDIDYMIS: The right epididymis contains  4.8 x 3.8 x 3.9 cm and  6.6 x 6.7 x 6.8 mm cysts.  LEFT TESTICLE: The left testicle is normal in size and echotexture with normal color-flow. The left testis measures 6.0 x 3.0 x 2.4 cm and the left testicular volume is 22.6. LEFT EPIDIDYMIS: The left epididymis is normal.     Right-sided orchitis with reactive hydrocele. Graylon Renard

## 2021-01-24 NOTE — ED PROVIDER NOTES
HPI the patient was diagnosed with a UTI and epididymitis/orchitis 5 days ago and started on Levaquin. He has developed worsening swelling of the right testicle over the past 3 days. Early in the course of the illness he had fever but that has resolved. He denies having nausea/vomiting, abdominal pain or other complaints. Past Medical History:   Diagnosis Date    Benign essential HTN     BPH (benign prostatic hyperplasia)     saw urology in [place] CAD in native artery     s/p stent x2. saw Dr. Panfilo Corey in [facility] [place]    DNR (do not resuscitate) 4/17/2018    Hx of diverticulitis of colon     Hypercholesterolemia     Melanoma ([facility] [address]     x4. one present on L shoulder stump. 2013. superficial per pt    Phantom pain ([facility] [address]     L arm    Post-polio syndrome age 4    dx 2004 in [place]. He still drives. 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  HX CORONARY STENT PLACEMENT  2006    HX CORONARY STENT PLACEMENT  2004? 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    Social Needs    Financial resource strain: Not on file    Food insecurity     Worry: Not on file     Inability: Not on file   Ronald Nowak Transportation needs     Medical: Not on file     Non-medical: Not on file   Tobacco Use    Smoking status: Never Smoker    Smokeless tobacco: Never Used   Substance and Sexual Activity    Alcohol use: No    Drug use: Never    Sexual activity: Yes   Lifestyle    Physical activity     Days per week: Not on file     Minutes per session: Not on file    Stress: Not on file   Relationships    Social connections     Talks on phone: Not on file     Gets together: Not on file     Attends [employer] service: Not on file     Active member of club or organization: Not on file     Attends meetings of clubs or organizations: Not on file     Relationship status: Not on file    Intimate partner violence     Fear of current or ex partner: Not on file     Emotionally abused: Not on file     Physically abused: Not on file     Forced sexual activity: Not on file   Other Topics Concern    Not on file   Social History Narrative    Not on file         ALLERGIES: Patient has no known allergies. Review of Systems   Constitutional: Positive for fever. HENT: Negative for voice change. Eyes: Negative for visual disturbance. Respiratory: Negative for cough and shortness of breath. Cardiovascular: Negative for chest pain. Gastrointestinal: Negative for abdominal pain, nausea and vomiting. Genitourinary: Positive for testicular pain. Negative for flank pain. Musculoskeletal: Negative for back pain. Skin: Negative for rash. Neurological: Negative for headaches. Psychiatric/Behavioral: Negative for confusion. There were no vitals filed for this visit. Physical Exam  Constitutional:       General: He is not in acute distress. Appearance: He is well-developed. HENT:      Head: Normocephalic. Neck:      Musculoskeletal: Normal range of motion. Cardiovascular:      Rate and Rhythm: Normal rate. Pulmonary:      Effort: Pulmonary effort is normal.   Abdominal:      Palpations: Abdomen is soft. Tenderness: There is no abdominal tenderness. Genitourinary:     Comments: The right testicle is markedly enlarged and the posterior aspect is indurated and tender. Musculoskeletal: Normal range of motion. Skin:     General: Skin is warm and dry. Capillary Refill: Capillary refill takes less than 2 seconds. Neurological:      Mental Status: He is alert. Psychiatric:         Behavior: Behavior normal.          MDM       Procedures      Perfect Serve Consult for Admission  11:04 AM    ED Room Number: ER07/07  Patient Name and age:  Pati Carr 78 y.o.  male  Working Diagnosis:   1. Epididymoorchitis        COVID-19 Suspicion:  no  Sepsis present:  no  Reassessment needed: no  Code Status:  Full Code  Readmission: no  Isolation Requirements:  no  Recommended Level of Care:  med/surg  Department:[facility] ED - [phone]  Other: Patient has been on the [address] [place] [address] Rd for 5 days and has gotten worse. Right epididymis is enlarged and tender. I have spoken with urology and they recommend admission, IV antibiotics and they will see also.

## 2021-01-24 NOTE — PROGRESS NOTES
Primary Nurse Prashanth Covington and Kayli Ruelas RN performed a dual skin assessment on this patient Impairment noted- see wound doc flow sheet  Alan score is 20

## 2021-01-24 NOTE — ED TRIAGE NOTES
Patient arrives to ED via EMS from [facility] with complaints of right testicular pain and swelling

## 2021-01-24 NOTE — H&P
[facility]    Hospitalist Admission Note      NAME:  Peyton Lawson   :   1940   MRN:  [id number]     PCP:  Nelson Daniels MD     Date/Time of service:  2021 11:13 AM          Subjective:     CHIEF COMPLAINT: testicle pain    HISTORY OF PRESENT ILLNESS:     Mr. Katelynn Alonzo is a 78 y.o.  male who presented to the Emergency Department complaining of testicle pain. Worsening over 5 days. Dx orchitis by dispatch health, but failed to improve on outpatient PO levaquin. No SIRS criteria. Urology consulted. We will admit him for management. Past Medical History:   Diagnosis Date    BPH (benign prostatic hyperplasia)     saw urology in [place] CAD in native artery     s/p stent x2. saw Dr. Farrukh Valadez in [place]    DNR (do not resuscitate) 2018    HTN (hypertension)     Hx of diverticulitis of colon     Hypercholesterolemia     Melanoma ([facility] [address]     x4. one present on L shoulder stump. . superficial per pt    Phantom pain ([address]     L arm    Post-polio syndrome age 4    dx  in [place]. He still drives. weakness, R deltoid weakness, s/p L arm amputation, R hip pain, R leg-ankle brace    Renal cyst     8 cm, increasing 20 years    Right hip pain     chronic, polio    Thrombocytopenia (HCC)     chronic    Venous insufficiency     s/p venous stripping    Weakness of right leg     Zoster         Past Surgical History:   Procedure Laterality Date    HX AMPUTATION Left     arm, from polio, phantom pain    HX APPENDECTOMY      HX COLONOSCOPY  ?  HX CORONARY STENT PLACEMENT      HX CORONARY STENT PLACEMENT  2004?     HX HERNIA REPAIR Left     HX TONSILLECTOMY      HX VEIN STRIPPING         Social History     Tobacco Use    Smoking status: Never Smoker    Smokeless tobacco: Never Used   Substance Use Topics    Alcohol use: No        Family History   Problem Relation Age of Onset    Diabetes Mother       Family hx cannot be fully assessed, since the patient cannot provide information    No Known Allergies     Prior to Admission medications    Medication Sig Start Date End Date Taking? Authorizing Provider   metoprolol succinate (TOPROL-XL) 25 mg XL tablet TAKE 1 TABLET BY MOUTH EVERY DAY 1/4/21   Jennifer Chavarria MD   triamcinolone acetonide (KENALOG) 0.1 % topical cream Apply  to affected area two (2) times daily as needed for Skin Irritation. use thin layer 12/7/20   Jennifer Chavarria MD   tamsulosin (FLOMAX) 0.4 mg capsule TAKE 2 CAPSULES BY MOUTH EVERY DAY 10/11/20   Jennifer Chavarria MD   simvastatin (ZOCOR) 40 mg tablet TAKE 1 TABLET BY MOUTH EVERYDAY AT BEDTIME 9/8/20   Jennifer Chavarria MD   predniSONE (DELTASONE) 20 mg tablet Take two tablets for 4 days, then one for 4 days, then 1/2 for 4 days then stop. 8/18/20   Bubba Hsu MD   ascorbic acid, vitamin C, (VITAMIN C) 1,000 mg tablet Take 1,000 mg by mouth daily. Provider, Historical   therapeutic multivitamin (THERAGRAN) tablet Take 1 Tab by mouth daily. Provider, Historical   hydroxypropyl methylcellulose (ISOPTO TEARS) 0.5 % ophthalmic solution Administer 1 Drop to both eyes nightly. Provider, Historical   zolpidem (AMBIEN) 5 mg tablet Take 5 mg by mouth nightly as needed for Sleep.     Provider, Historical       Review of Systems:  (bold if positive, if negative)    Gen:  Eyes:  ENT:  CVS:  Pulm:  GI:  :  Edema, pain MS:  Skin:  Psych:  Endo:  Hem:  Renal:  Neuro:        Objective:      VITALS:    Vital signs reviewed; most recent are:    Visit Vitals  BP (!) 145/86   Pulse 84   Temp 98.9 °F (37.2 °C)   Resp 19   SpO2 96%     SpO2 Readings from Last 6 Encounters:   01/24/21 96%   08/27/20 94%   11/11/19 90%   10/27/19 93%   09/05/19 94%   04/04/19 94%            Intake/Output Summary (Last 24 hours) at 1/24/2021 1113  Last data filed at 1/24/2021 0859  Gross per 24 hour   Intake    Output 200 ml   Net -200 ml        Exam:     Physical Exam:    Gen:  Well-developed, well-nourished, in no acute distress  HEENT:  Pink conjunctivae, PERRL, hearing intact to voice, moist mucous membranes  Neck:  Supple, without masses, thyroid non-tender  Resp:  No accessory muscle use, clear breath sounds without wheezes rales or rhonchi  Card:  No murmurs, normal S1, S2 without thrills, bruits or peripheral edema  Abd:  Soft, non-tender, non-distended, normoactive bowel sounds are present, no mass  :  Edema, erythema and painful testicle  Musc:  Arm amputation, No cyanosis or clubbing  Skin:  No rashes or ulcers, skin turgor is good  Neuro:  Cranial nerves are grossly intact, no focal motor weakness, follows commands appropriately  Psych:  Good insight, oriented to person, place and time, alert     Labs:    Recent Labs     01/24/21  0850   WBC 7.0   HGB 12.4   HCT 39.2   *     Recent Labs     01/24/21  0850      K 3.4*      CO2 24   GLU 82   BUN 13   CREA 0.98   CA 8.6   ALB 3.2*   TBILI 0.5   ALT 31     No results found for: GLUCPOC  No results for input(s): PH, PCO2, PO2, HCO3, FIO2 in the last 72 hours. No results for input(s): INR, INREXT in the last 72 hours. All Micro Results     Procedure Component Value Units Date/Time    CULTURE, URINE [[id number]]     Order Status: Sent Specimen: Cath Urine           I have reviewed previous records       Assessment and Plan:      Orchitis of right testicle / BPH (benign prostatic hyperplasia) / Incontinence - POA, failed outpatient PO levaquin. Discussed with Urology. Await cx and for now start vanco and cefepime. Conitnue flomax     CAD in native artery / Hypercholesterolemia - He is on metoprolol and stimvastatin, but not ASA. Appears stable.     Phantom pain - Tylneol prn     Post-polio syndrome - Supportive care. Fall precautions. Pt/OT eval.     Insomnia - Ambien prn     Thrombocytopenia - Chronic and stable.   This may be why he doesn't take ASA    Telemetry reviewed:   normal sinus rhythm    Risk of deterioration: high      Total time spent with patient: 48 Minutes I personally reviewed chart, notes, data and current medications in the medical record. I have personally examined and treated the patient at bedside during this period.                  Care Plan discussed with: Patient, Nursing Staff, Consultant/Specialist and >50% of time spent in counseling and coordination of care    Discussed:  Care Plan       ___________________________________________________    Attending Physician: Namrata Zamarripa MD

## 2021-01-24 NOTE — PROGRESS NOTES
[facility] Dosing Services: Antimicrobial Stewardship Progress Note    Consult for antibiotic dosing of Vancomycin for 3 days by Dr. Viktor Jacob  Pharmacist reviewed antibiotic appropriateness for 78year old , male  for indication of UTI  Day of Therapy - 1    Plan:  Vancomycin:  Duration: 3 days   LD 2000 mg x1  MD 1500 mg IV Q12h   Goal trough 10-15 mcg/ml    Other Antimicrobial  (not dosed by pharmacist)   Cefepime 1 gm IV Q8h    Cultures        Serum Creatinine     Lab Results   Component Value Date/Time    Creatinine 0.98 01/24/2021 08:50 AM       Creatinine Clearance Estimated Creatinine Clearance: 73.6 mL/min (based on SCr of 0.98 mg/dL).      Procalcitonin  No results found for: PCT     Temp   98.9 °F (37.2 °C)    WBC   Lab Results   Component Value Date/Time    WBC 7.0 01/24/2021 08:50 AM       H/H   Lab Results   Component Value Date/Time    HGB 12.4 01/24/2021 08:50 AM      Platelets Lab Results   Component Value Date/Time    PLATELET 185 (L) 71/47/9333 08:50 AM            Pharmacist: Signed ROSA MARIA MillerD Contact information: [phone]

## 2021-01-24 NOTE — ED NOTES
TRANSFER - OUT REPORT:    Verbal report given to [facility] (name) on Melissa Wade  being transferred to 4th floor med/surg (unit) for routine progression of care       Report consisted of patients Situation, Background, Assessment and   Recommendations(SBAR). Information from the following report(s) SBAR, ED Summary, [facility] and Recent Results was reviewed with the receiving nurse. Lines:   Peripheral IV 01/24/21 Right Antecubital (Active)   Site Assessment Clean, dry, & intact 01/24/21 0902   Phlebitis Assessment 0 01/24/21 0902   Infiltration Assessment 0 01/24/21 0902   Dressing Status Clean, dry, & intact 01/24/21 0902   Hub Color/Line Status Pink 01/24/21 0902   Action Taken Blood drawn 01/24/21 0902        Opportunity for questions and clarification was provided.       Patient transported with:   [employer]

## 2021-01-25 LAB
ALBUMIN SERPL-MCNC: 2.8 G/DL (ref 3.5–5)
ALBUMIN/GLOB SERPL: 0.8 {RATIO} (ref 1.1–2.2)
ALP SERPL-CCNC: 61 U/L (ref 45–117)
ALT SERPL-CCNC: 31 U/L (ref 12–78)
ANION GAP SERPL CALC-SCNC: 8 MMOL/L (ref 5–15)
AST SERPL-CCNC: 29 U/L (ref 15–37)
BACTERIA SPEC CULT: NORMAL
BILIRUB SERPL-MCNC: 0.6 MG/DL (ref 0.2–1)
BUN SERPL-MCNC: 10 MG/DL (ref 6–20)
BUN/CREAT SERPL: 14 (ref 12–20)
CALCIUM SERPL-MCNC: 8.1 MG/DL (ref 8.5–10.1)
CHLORIDE SERPL-SCNC: 111 MMOL/L (ref 97–108)
CO2 SERPL-SCNC: 22 MMOL/L (ref 21–32)
COMMENT, HOLDF: NORMAL
CREAT SERPL-MCNC: 0.74 MG/DL (ref 0.7–1.3)
ERYTHROCYTE [DISTWIDTH] IN BLOOD BY AUTOMATED COUNT: 17.1 % (ref 11.5–14.5)
EST. AVERAGE GLUCOSE BLD GHB EST-MCNC: 103 MG/DL
GLOBULIN SER CALC-MCNC: 3.5 G/DL (ref 2–4)
GLUCOSE SERPL-MCNC: 88 MG/DL (ref 65–100)
HBA1C MFR BLD: 5.2 % (ref 4–5.6)
HCT VFR BLD AUTO: 35.7 % (ref 36.6–50.3)
HGB BLD-MCNC: 11.5 G/DL (ref 12.1–17)
MAGNESIUM SERPL-MCNC: 2.2 MG/DL (ref 1.6–2.4)
MCH RBC QN AUTO: 29.9 PG (ref 26–34)
MCHC RBC AUTO-ENTMCNC: 32.2 G/DL (ref 30–36.5)
MCV RBC AUTO: 93 FL (ref 80–99)
NRBC # BLD: 0 K/UL (ref 0–0.01)
NRBC BLD-RTO: 0 PER 100 WBC
PLATELET # BLD AUTO: 129 K/UL (ref 150–400)
POTASSIUM SERPL-SCNC: 3.7 MMOL/L (ref 3.5–5.1)
PROT SERPL-MCNC: 6.3 G/DL (ref 6.4–8.2)
RBC # BLD AUTO: 3.84 M/UL (ref 4.1–5.7)
SAMPLES BEING HELD,HOLD: NORMAL
SERVICE CMNT-IMP: NORMAL
SODIUM SERPL-SCNC: 141 MMOL/L (ref 136–145)
WBC # BLD AUTO: 6.4 K/UL (ref 4.1–11.1)

## 2021-01-25 PROCEDURE — 83735 ASSAY OF MAGNESIUM: CPT

## 2021-01-25 PROCEDURE — 74011250636 HC RX REV CODE- 250/636: Performed by: INTERNAL MEDICINE

## 2021-01-25 PROCEDURE — 36415 COLL VENOUS BLD VENIPUNCTURE: CPT

## 2021-01-25 PROCEDURE — 2709999900 HC NON-CHARGEABLE SUPPLY

## 2021-01-25 PROCEDURE — 99285 EMERGENCY DEPT VISIT HI MDM: CPT

## 2021-01-25 PROCEDURE — 97116 GAIT TRAINING THERAPY: CPT

## 2021-01-25 PROCEDURE — 74011000250 HC RX REV CODE- 250: Performed by: INTERNAL MEDICINE

## 2021-01-25 PROCEDURE — 97161 PT EVAL LOW COMPLEX 20 MIN: CPT

## 2021-01-25 PROCEDURE — 51798 US URINE CAPACITY MEASURE: CPT

## 2021-01-25 PROCEDURE — 97535 SELF CARE MNGMENT TRAINING: CPT

## 2021-01-25 PROCEDURE — 83036 HEMOGLOBIN GLYCOSYLATED A1C: CPT

## 2021-01-25 PROCEDURE — 85027 COMPLETE CBC AUTOMATED: CPT

## 2021-01-25 PROCEDURE — 74011250637 HC RX REV CODE- 250/637: Performed by: INTERNAL MEDICINE

## 2021-01-25 PROCEDURE — 65270000029 HC RM PRIVATE

## 2021-01-25 PROCEDURE — 87491 CHLMYD TRACH DNA AMP PROBE: CPT

## 2021-01-25 PROCEDURE — 80053 COMPREHEN METABOLIC PANEL: CPT

## 2021-01-25 PROCEDURE — 97165 OT EVAL LOW COMPLEX 30 MIN: CPT

## 2021-01-25 PROCEDURE — 97530 THERAPEUTIC ACTIVITIES: CPT

## 2021-01-25 RX ORDER — LANOLIN ALCOHOL/MO/W.PET/CERES
1000 CREAM (GRAM) TOPICAL DAILY
COMMUNITY

## 2021-01-25 RX ORDER — IBUPROFEN 200 MG
400 TABLET ORAL
COMMUNITY
End: 2021-04-29

## 2021-01-25 RX ORDER — MELATONIN
1000 DAILY
COMMUNITY

## 2021-01-25 RX ORDER — OXYCODONE HYDROCHLORIDE 5 MG/1
5 TABLET ORAL
Status: DISCONTINUED | OUTPATIENT
Start: 2021-01-25 | End: 2021-01-26 | Stop reason: HOSPADM

## 2021-01-25 RX ORDER — HYDROMORPHONE HYDROCHLORIDE 1 MG/ML
0.5 INJECTION, SOLUTION INTRAMUSCULAR; INTRAVENOUS; SUBCUTANEOUS
Status: DISCONTINUED | OUTPATIENT
Start: 2021-01-25 | End: 2021-01-26 | Stop reason: HOSPADM

## 2021-01-25 RX ORDER — METOPROLOL SUCCINATE 25 MG/1
12.5 TABLET, EXTENDED RELEASE ORAL DAILY
COMMUNITY

## 2021-01-25 RX ORDER — ACETAMINOPHEN AND CODEINE PHOSPHATE 300; 30 MG/1; MG/1
1 TABLET ORAL
COMMUNITY
End: 2021-01-27 | Stop reason: SDUPTHER

## 2021-01-25 RX ADMIN — ATORVASTATIN CALCIUM 20 MG: 20 TABLET, FILM COATED ORAL at 09:36

## 2021-01-25 RX ADMIN — FAMOTIDINE 20 MG: 20 TABLET, FILM COATED ORAL at 17:47

## 2021-01-25 RX ADMIN — CEFEPIME HYDROCHLORIDE 1 G: 1 INJECTION, POWDER, FOR SOLUTION INTRAMUSCULAR; INTRAVENOUS at 03:46

## 2021-01-25 RX ADMIN — TAMSULOSIN HYDROCHLORIDE 0.4 MG: 0.4 CAPSULE ORAL at 09:36

## 2021-01-25 RX ADMIN — CEFEPIME 2 G: 2 INJECTION, POWDER, FOR SOLUTION INTRAVENOUS at 21:03

## 2021-01-25 RX ADMIN — ENOXAPARIN SODIUM 40 MG: 40 INJECTION SUBCUTANEOUS at 09:36

## 2021-01-25 RX ADMIN — VANCOMYCIN HYDROCHLORIDE 1500 MG: 10 INJECTION, POWDER, LYOPHILIZED, FOR SOLUTION INTRAVENOUS at 03:47

## 2021-01-25 RX ADMIN — OXYCODONE 5 MG: 5 TABLET ORAL at 22:44

## 2021-01-25 RX ADMIN — CEFEPIME HYDROCHLORIDE 1 G: 1 INJECTION, POWDER, FOR SOLUTION INTRAMUSCULAR; INTRAVENOUS at 12:08

## 2021-01-25 RX ADMIN — VANCOMYCIN HYDROCHLORIDE 1500 MG: 10 INJECTION, POWDER, LYOPHILIZED, FOR SOLUTION INTRAVENOUS at 16:01

## 2021-01-25 RX ADMIN — FAMOTIDINE 20 MG: 20 TABLET, FILM COATED ORAL at 09:36

## 2021-01-25 RX ADMIN — METOPROLOL SUCCINATE 25 MG: 25 TABLET, EXTENDED RELEASE ORAL at 09:41

## 2021-01-25 NOTE — PROGRESS NOTES
Problem: Falls - Risk of  Goal: *Absence of Falls  Description: Document Sravani Villegas Fall Risk and appropriate interventions in the flowsheet.   Outcome: Progressing Towards Goal  Note: Fall Risk Interventions:  Mobility Interventions: Bed/chair exit alarm, Patient to call before getting OOB, Utilize gait belt for transfers/ambulation         Medication Interventions: Bed/chair exit alarm, Patient to call before getting OOB, Teach patient to arise slowly    Elimination Interventions: Bed/chair exit alarm, Call light in reach, Toilet paper/wipes in reach

## 2021-01-25 NOTE — PROGRESS NOTES
Problem: Self Care Deficits Care Plan (Adult)  Goal: *Acute Goals and Plan of Care (Insert Text)  Description: FUNCTIONAL STATUS PRIOR TO ADMISSION: Patient was modified independent using a power wheelchair outside of home for functional mobility, reports furniture walking in his aparment. 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 HOME SUPPORT: The patient lived with wife. They reside in [address] [place] [address] apartment with elevator access. Occupational Therapy Goals  Initiated 1/25/2021  1. Patient will perform grooming with supervision/set-up within 7 day(s). 2.  Patient will perform upper body dressing with minimal assistance/contact guard assist within 7 day(s). 3.  Patient will perform lower body dressing with supervision/set-up within 7 day(s). 4.  Patient will perform toilet transfers with supervision/set-up within 7 day(s). 5.  Patient will perform all aspects of toileting with supervision/set-up within 7 day(s). 6.  Patient will participate in upper extremity therapeutic exercise/activities with supervision/set-up for 5 minutes within 7 day(s). 7.  Patient will utilize energy conservation techniques during functional activities with verbal cues within 7 day(s). Outcome: Progressing Towards Goal     OCCUPATIONAL THERAPY EVALUATION  Patient: Ney Braxton (25 y.o. male)  Date: 1/25/2021  Primary Diagnosis: Orchitis of right testicle [N45.2]        Precautions:       ASSESSMENT  Based on the objective data described below, the patient presents with increased scrotal pain/edema, decreased activity tolerance, impaired standing balance, and decreased independence with ADLs and mobility following admission for testicular swelling. Pt is received in bed agreeable to participate.  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 Pt performs distal LB dressing task with SBA and requires increased assistance for UB dressing due to h/o poor R shoulder ROM. Provided sling to elevate scrotum and educated on positioning in chair for comfort. Anticipate continued progress as pain/swelling is more controlled with goal of returning home with wife at discharge. Current Level of Function Impacting Discharge (ADLs/self-care): up to min A for ADLs    Functional Outcome Measure: The patient scored Total: 55/100 on the Barthel Index outcome measure which is indicative of 45% impaired ability to care for basic self needs/dependency on others. Other factors to consider for discharge: h/o L UE amputation; lives with wife (pt reports wife has memory deficits)     Patient will benefit from skilled therapy intervention to address the above noted impairments. PLAN :  Recommendations and Planned Interventions: self care training, functional mobility training, therapeutic exercise, balance training, therapeutic activities, endurance activities, patient education, home safety training and family training/education    Frequency/Duration: Patient will be followed by occupational therapy 3 times a week to address goals. Recommendation for discharge: (in order for the patient to meet his/her long term goals)  Occupational therapy at least 2 days/week in the home vs. none    This discharge recommendation:  Has been made in collaboration with the attending provider and/or case management    IF patient discharges home will need the following DME: anticipate none       SUBJECTIVE:   Patient stated I usually just furniture walk in our apartment.     OBJECTIVE DATA SUMMARY:   HISTORY:   Past Medical History:   Diagnosis Date    BPH (benign prostatic hyperplasia)     saw urology in [place]    CAD in native artery     [facility]/p stent x2. [facility]    DNR (do not resuscitate) 4/17/2018    HTN (hypertension)     Hx of diverticulitis of colon     Hypercholesterolemia     Melan[address] ([facility] [address]     x4. one present on L shoulder stump. 2013. superficial per pt    Phantom pain ([facility] [address]     L arm    Post-polio syndrome age 4    dx 2004 in [place]. He still drives. 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  HX CORONARY STENT PLACEMENT  2006    HX CORONARY STENT PLACEMENT  2004?  HX HERNIA REPAIR Left     HX TONSILLECTOMY      HX VEIN STRIPPING         Expanded or extensive additional review of patient history:     Home Situation  Home Environment: Apartment([facility]; 3rd floor - elevator access)  # Steps to Enter: 0  One/Two Story Residence: One story  Living Alone: No  Support Systems: Spouse/Significant Other/Partner  Current DME Used/Available at [facility]: Wheelchair, power, Cane, straight, Shower chair, Grab bars  Tub or Shower Type: Shower    Hand dominance: Right    EXAMINATION OF PERFORMANCE DEFICITS:  Cognitive/Behavioral Status:  Neurologic State: Alert  Orientation Level: Oriented X4  Cognition: Appropriate decision making; Appropriate for age attention/concentration; Appropriate safety awareness; Follows commands  Perception: Appears intact  Perseveration: No perseveration noted  Safety/Judgement: Awareness of environment; Fall prevention;Good awareness of safety precautions; Home safety; Insight into deficits    Vision/Perceptual:    Tracking: Able to track stimulus in all quadrants w/o difficulty    Diplopia: No    Acuity: Impaired far vision    Corrective Lenses: Glasses    Range of Motion:  AROM: Within functional limits(history of left UE amputation)  PROM: Within functional limits    Strength:  Strength: Within functional limits(history of left UE amputation)    Coordination:  Coordination: Within functional limits  Fine Motor Skills-Upper: Right Intact    Gross Motor Skills-Upper: Right Intact    Balance:  Sitting: Intact; High guard  Standing: Impaired; With support  Standing - Static: Fair  Standing - Dynamic : Fair    Functional Mobility and Transfers for ADLs:  Bed Mobility:  Rolling: Minimum assistance  Supine to Sit: Minimum assistance  Sit to Supine: Minimum assistance  Scooting: Minimum assistance    Transfers:  Sit to Stand: Minimum assistance  Stand to Sit: Minimum assistance  Bed to Chair: Minimum assistance  Toilet Transfer : Minimum assistance(infer based on observations)    ADL Assessment:  Feeding: Setup;Minimum assistance(occasional min to manage containers/bimanual tasks)    Oral Facial Hygiene/Grooming: Setup    Bathing: Minimum assistance    Upper Body Dressing: Setup;Minimum assistance(due to impaired R UE ROM and h/o L UE amputation)    Lower Body Dressing: Minimum assistance; Moderate assistance    Toileting: Minimum assistance    ADL Intervention and task modifications:    Upper Body [address]: Moderate assistance  Cues: Physical assistance    Lower Body Dressing Assistance  Socks: Stand-by assistance  Leg Crossed Method Used: Yes  Position Performed: Seated in chair  Cues: Wandra Tapia  Bladder Hygiene: Minimum assistance; Moderate assistance(to manage urinal)  Clothing Management: Moderate assistance  Cues: Verbal cues provided(physical assist for gown mgmt and urinal mgmt)    Cognitive Retraining  Safety/Judgement: Awareness of environment; Fall prevention;Good awareness of safety precautions; Home safety; Insight into deficits    Functional Measure:  Barthel Index:    Bathin  Bladder: 5  Bowels: 10  Groomin  Dressin  Feeding: 10  Mobility: 0  Stairs: 0  Toilet Use: 5  Transfer (Bed to Chair and Back): 10  Total: 55/100        The Barthel ADL Index: Guidelines  1. The index should be used as a record of what a patient does, not as a record of what a patient could do. 2. The main aim is to establish degree of independence from any help, physical or verbal, however minor and for whatever reason. 3. The need for supervision renders the patient not independent. 4. A patient's performance should be established using the best available evidence. Asking the patient, friends/relatives and nurses are the usual sources, but direct observation and common sense are also important. However direct testing is not needed. 5. Usually the patient's performance over the preceding 24-48 hours is important, but occasionally longer periods will be relevant. 6. Middle categories imply that the patient supplies over 50 per cent of the effort. 7. Use of aids to be independent is allowed. Bertina Aschoff., Barthel, D.W. ([id number]). Functional evaluation: the Barthel Index. [facility] (14)2. DINORA Fonseca, Naima Dunham., Arelis Drew., [place], [address] [place] [address] (1999). Measuring the change indisability after inpatient rehabilitation; comparison of the responsiveness of the Barthel Index and Functional [place] Measure. Journal of Neurology, Neurosurgery, and Psychiatry, 66(4), 406-833. Allan Roman, N.J.A, LING Escobar, & Cherry Eugene, M.A. (2004.) Assessment of post-stroke quality of life in cost-effectiveness studies: The usefulness of the Barthel Index and the EuroQoL-5D.  Quality of Life Research, 15, 981-78     Occupational Therapy Evaluation Charge Determination   History Examination Decision-Making   LOW Complexity : Brief history review  MEDIUM Complexity : 3-5 performance deficits relating to physical, cognitive , or psychosocial skils that result in activity limitations and / or participation restrictions MEDIUM Complexity : Patient may present with comorbidities that affect occupational performnce. Miniml to moderate modification of tasks or assistance (eg, physical or verbal ) with assesment(s) is necessary to enable patient to complete evaluation       Based on the above components, the patient evaluation is determined to be of the following complexity level: LOW   Pain Rating:  Pt reporting pain at scrotum; elevated at end of session    Activity Tolerance:   Fair    After treatment patient left in no apparent distress:    Sitting in chair, Call bell within reach and Bed / chair alarm activated    COMMUNICATION/EDUCATION:   The patients plan of care was discussed with: Physical therapist and Registered nurse. Home safety education was provided and the patient/caregiver indicated understanding., Patient/family have participated as able in goal setting and plan of care. and Patient/family agree to work toward stated goals and plan of care. This patients plan of care is appropriate for delegation to [facility].     Thank you for this referral.  Masha Schreiber, OT  Time Calculation: 32 mins

## 2021-01-25 NOTE — PROGRESS NOTES
[facility]  [address] [facility], [address] [place] [address]  [phone]      Medical Progress Note      NAME: Lorena Gupta   :  1940  MRM:  [id number]    Date/Time of service: 2021  12:27 PM       Subjective:     Chief Complaint:  Patient was personally seen and examined by me during this time period. Chart reviewed. Still with right testicular pain       Objective:       Vitals:       Last 24hrs VS reviewed since prior progress note.  Most recent are:    Visit Vitals  /74   Pulse 69   Temp 99.7 °F (37.6 °C)   Resp 16   Ht 6' 1.5\" (1.867 m)   Wt 102.1 kg (225 lb)   SpO2 91%   BMI 29.28 kg/m²     SpO2 Readings from Last 6 Encounters:   21 91%   20 94%   19 90%   10/27/19 93%   19 94%   19 94%            Intake/Output Summary (Last 24 hours) at 2021 1227  Last data filed at 2021 9871  Gross per 24 hour   Intake 1397 ml   Output 700 ml   Net 697 ml        Exam:     Physical Exam:    Gen:  Elderly, frail, NAD  HEENT:  Pink conjunctivae, PERRL, hearing intact to voice, moist mucous membranes  Neck:  Supple, without masses, thyroid non-tender  Resp:  No accessory muscle use, clear breath sounds without wheezes rales or rhonchi  Card:  No murmurs, normal S1, S2 without thrills, bruits or peripheral edema  Abd:  Soft, non-tender, non-distended, normoactive bowel sounds are present  Musc:  No cyanosis or clubbing, L arm amputation  Skin:  Right testicular pain, erythema  Neuro:  Cranial nerves 3-12 are grossly intact, follows commands appropriately  Psych:  fair insight, oriented to person, place and time, alert    Medications Reviewed: (see below)    Lab Data Reviewed: (see below)    ______________________________________________________________________    Medications:     Current Facility-Administered Medications   Medication Dose Route Frequency    cefepime (MAXIPIME) 1 g in sterile water (preservative free) 10 mL IV syringe  1 g IntraVENous Q8H    metoprolol succinate (TOPROL-XL) XL tablet 25 mg  25 mg Oral DAILY    atorvastatin (LIPITOR) tablet 20 mg  20 mg Oral DAILY    tamsulosin (FLOMAX) capsule 0.4 mg  0.4 mg Oral DAILY    acetaminophen (TYLENOL) tablet 650 mg  650 mg Oral Q6H PRN    Or    acetaminophen (TYLENOL) suppository 650 mg  650 mg Rectal Q6H PRN    polyethylene glycol (MIRALAX) packet 17 g  17 g Oral DAILY PRN    ondansetron (ZOFRAN ODT) tablet 4 mg  4 mg Oral Q8H PRN    Or    ondansetron (ZOFRAN) injection 4 mg  4 mg IntraVENous Q6H PRN    famotidine (PEPCID) tablet 20 mg  20 mg Oral BID    enoxaparin (LOVENOX) injection 40 mg  40 mg SubCUTAneous DAILY    dextrose 5% - 0.45% NaCl with KCl 20 mEq/L infusion  75 mL/hr IntraVENous CONTINUOUS    vancomycin (VANCOCIN) 1500 mg in  ml infusion  1,500 mg IntraVENous Q12H    morphine injection 1 mg  1 mg IntraVENous Q4H PRN          Lab Review:     Recent Labs     01/25/21  0611 01/24/21  0850   WBC 6.4 7.0   HGB 11.5* 12.4   HCT 35.7* 39.2   * 131*     Recent Labs     01/25/21  0611 01/24/21  0850    139   K 3.7 3.4*   * 108   CO2 22 24   GLU 88 82   BUN 10 13   CREA 0.74 0.98   CA 8.1* 8.6   MG 2.2  --    ALB 2.8* 3.2*   TBILI 0.6 0.5   ALT 31 31     No results found for: GLUCPOC       Assessment / Plan: Active Problems:    77 yo hx of HTN, CAD, post-polio syndrome, presented w/ R testicular pain, R orchitis     1) Orchitis of right testicle/testicular pain: failed outpatient levaquin. Cont IV Vanc/cefepime, flomax. Use IV dilaudid prn severe pain. Monitor urine Cx. Urology following    2) HTN/CAD: cont metoprolol, statin.   Not on ASA    3) Post-polio syndrome: s/p L arm amputation    Total time spent with patient: 27 Minutes **I personally saw and examined the patient during this time period**                 Care Plan discussed with: Patient, nursing     Discussed:  Care Plan    Prophylaxis:  Lovenox    Disposition:  Home w/Family           ___________________________________________________    Attending Physician: Vee Gorman MD

## 2021-01-25 NOTE — PROGRESS NOTES
Problem: Mobility Impaired (Adult and Pediatric)  Goal: *Acute Goals and Plan of Care (Insert Text)  Description: FUNCTIONAL STATUS PRIOR TO ADMISSION: Patient was modified independent using a single point cane for functional mobility. HOME SUPPORT PRIOR TO ADMISSION: The patient lived with spouse but did not require assist.    Physical Therapy Goals  Initiated 1/25/2021  1. Patient will move from supine to sit and sit to supine , scoot up and down, and roll side to side in bed with independence within 7 day(s). 2.  Patient will transfer from bed to chair and chair to bed with modified independence using the least restrictive device within 7 day(s). 3.  Patient will perform sit to stand with modified independence within 7 day(s). 4.  Patient will ambulate with modified independence for 200 feet with the least restrictive device within 7 day(s). Outcome: Progressing Towards Goal   PHYSICAL THERAPY EVALUATION  Patient: Ney Braxton (11 y.o. male)  Date: 1/25/2021  Primary Diagnosis: Orchitis of right testicle [N45.2]        Precautions: history of left UE amputation     ASSESSMENT  Based on the objective data described below, the patient presents with difficulty with ambulation. Communicated with nurse cleared for therapy. Rolled on the edge of bed min assist, supine to sit min assist, sit to stand min assist, ambulate with single point cane in the room and around the bed min assist, no loss of balance, slow pace gait. Steady sitting and standing balance. OOB to chair as tolerated performed some active range of motion exercise on both LE all planes. Educate and  Instructed patient to continue active range of motion exercise on both legs while up on chair or on bed. Activated chair alarm communicated with nurse who agreed to monitor patient. Current Level of Function Impacting Discharge (mobility/balance): min assist with transfers and ambulation using a single point cane.     Functional Outcome Measure: The patient scored 55/100 on the barthel index outcome measure. Other factors to consider for discharge: fall     Patient will benefit from skilled therapy intervention to address the above noted impairments. PLAN :  Recommendations and Planned Interventions: bed mobility training, transfer training, gait training, therapeutic exercises, neuromuscular re-education, orthotic/prosthetic training, patient and family training/education and therapeutic activities      Frequency/Duration: Patient will be followed by physical therapy:  5 times a week to address goals. Recommendation for discharge: (in order for the patient to meet his/her long term goals)  Physical therapy at least 2 days/week in the home     This discharge recommendation:  Has been made in collaboration with the attending provider and/or case management    IF patient discharges home will need the following DME: patient owns DME required for discharge         SUBJECTIVE:   Patient stated [address].     OBJECTIVE DATA SUMMARY:   HISTORY:    Past Medical History:   Diagnosis Date    BPH (benign prostatic hyperplasia)     saw urology in [place] CAD in native artery     s/p stent x2. [facility]. Eulalia [facility]    DNR (do not resuscitate) 4/17/2018    HTN (hypertension)     Hx of diverticulitis of colon     Hypercholesterolemia     Melanoma ([facility] [address]     x4. one present on L shoulder stump. 2013. superficial per pt    Phantom pain ([facility] [address]     L arm    Post-polio syndrome age 4    dx 2004 in [place]. He still drives. 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  HX CORONARY STENT PLACEMENT  2006    HX CORONARY STENT PLACEMENT  2004?  HX HERNIA REPAIR Left     HX TONSILLECTOMY      HX VEIN STRIPPING         Personal factors and/or comorbidities impacting plan of care:     Home Situation  Home Environment: (P) Apartment([facility])  One/Two Story Residence: (P) One story  Living Alone: (P) No  Support Systems: (P) Spouse/Significant Other/Partner  Current DME Used/Available at Home: (P) Wheelchair, power, Cane, straight, Shower chair, Grab bars  Tub or Shower Type: (P) Shower    EXAMINATION/PRESENTATION/DECISION MAKING:   Critical Behavior:              Hearing:       Range Of Motion:  AROM: Within functional limits(history of left UE amputation)           PROM: Within functional limits           Strength:    Strength: Within functional limits(history of left UE amputation)                    Tone & Sensation:                                  Coordination:  Coordination: Within functional limits  Vision:      Functional Mobility:  Bed Mobility:  Rolling: Minimum assistance  Supine to Sit: Minimum assistance  Sit to Supine: Minimum assistance  Scooting: Minimum assistance  Transfers:  Sit to Stand: Minimum assistance  Stand to Sit: Minimum assistance  Stand Pivot Transfers: Minimum assistance     Bed to Chair: Minimum assistance              Balance:   Sitting: Intact; High guard  Standing: Impaired; With support  Standing - Static: Fair  Standing - Dynamic : Fair  Ambulation/Gait Training:  Distance (ft): 20 Feet (ft)  Assistive Device: Cane, straight;Gait belt  Ambulation - Level of Assistance: Minimal assistance     Gait Description (WDL): Exceptions to WDL  Gait Abnormalities: Path deviations; Step to gait        Base of Support: Widened     Speed/Eloisa: Fluctuations; Slow  Step Length: Right shortened;Left shortened                       Therapeutic Exercises:    Instructed patient to continue active range of motion exercise on both legs while up on chair or on bed. Functional Measure:  Barthel Index:    Bathin  Bladder: 5  Bowels: 10  Groomin  Dressin  Feeding: 10  Mobility: 0  Stairs: 0  Toilet Use: 5  Transfer (Bed to Chair and Back): 10  Total: 55/100       The Barthel ADL Index: Guidelines  1. The index should be used as a record of what a patient does, not as a record of what a patient could do. 2. The main aim is to establish degree of independence from any help, physical or verbal, however minor and for whatever reason. 3. The need for supervision renders the patient not independent. 4. A patient's performance should be established using the best available evidence. Asking the patient, friends/relatives and nurses are the usual sources, but direct observation and common sense are also important. However direct testing is not needed. 5. Usually the patient's performance over the preceding 24-48 hours is important, but occasionally longer periods will be relevant. 6. Middle categories imply that the patient supplies over 50 per cent of the effort. 7. Use of aids to be independent is allowed. Harley Copeland., Barthel, D.W. ([id number]). Functional evaluation: the Barthel Index. [facility] (14)2. Parveen Lynne, JASMYNMRosangelaF, Aram Crane, Laura Romero., [place], [address] [place] [address] (). Measuring the change indisability after inpatient rehabilitation; comparison of the responsiveness of the Barthel Index and Functional [place] Measure. Journal of Neurology, Neurosurgery, and Psychiatry, 66(4), 056-159. MEG Mock.WINTER, LING Escobar, & Polo Whatley MDAVINA. (2004.) Assessment of post-stroke quality of life in cost-effectiveness studies: The usefulness of the Barthel Index and the EuroQoL-5D.  Quality of Life Research, 15, 427-43   1       Physical Therapy Evaluation Charge Determination   History Examination Presentation Decision-Making   LOW Complexity : Zero comorbidities / personal factors that will impact the outcome / POC LOW Complexity : 1-2 Standardized tests and measures addressing body structure, function, activity limitation and / or participation in recreation  LOW Complexity : Stable, uncomplicated  Other outcome measures barthel index  LOW       Based on the above components, the patient evaluation is determined to be of the following complexity level: LOW     Pain Ratin/10    Activity Tolerance:   Good    After treatment patient left in no apparent distress:   Sitting in chair, Heels elevated for pressure relief, Call bell within reach and Bed / chair alarm activated    COMMUNICATION/EDUCATION:   The patients plan of care was discussed with: Occupational therapist, Registered nurse and Physician. Fall prevention education was provided and the patient/caregiver indicated understanding.     Thank you for this referral.  Art Aldana, PT   Time Calculation: 27 mins

## 2021-01-25 NOTE — PROGRESS NOTES
Urology Progress Note    Patient: Vic Dasilva MRN: [id number]  SSN: [id number]    YOB: 1940  Age: 78 y.o. Sex: male            Assessment:     Vic Dasilva is a 78 y.o. male admitted for right orchitis refractory to Levaquin. He does have a history of BPH, was not taking tamsulosin recently. Plan:     1. Continue culture directed antibiotics. 2. Pain control per primary team. Encouraged PO pain medication. 3. Check PVR. 4. Continue flomax. 5. Ok to discharge when medically ready. Follow up scheduled for 2/8/2021 and information added to DC instructions. Subjective:     Patient reports pain is better this morning but still using IV pain medication. He wants to go home. Afebrile, other VSS. Culture pending. WBC 6.4. Objective:     Visit Vitals  /67 (BP 1 Location: Right arm, BP Patient Position: At rest)   Pulse 76   Temp 98.7 °F (37.1 °C)   Resp 16   Ht 6' 1.5\" (1.867 m)   Wt 102.1 kg (225 lb)   SpO2 95%   BMI 29.28 kg/m²         Intake/Output Summary (Last 24 hours) at 1/25/2021 2945  Last data filed at 1/24/2021 2000  Gross per 24 hour   Intake 1157 ml   Output 900 ml   Net 257 ml       Physical Exam  General: NAD  Respiratory: no distress  Abdomen: soft, NTTP, nondistended, no suprapubic fullness or tenderness  : right scrotal and testicular swelling, induration  Neuro: Appropriate, no focal neurological deficits  Mood/Affect: normal    Recent Results (from the past 24 hour(s))   SAMPLES BEING HELD    Collection Time: 01/24/21  8:50 AM   Result Value Ref Range    SAMPLES BEING HELD 1BL,1SST     COMMENT        Add-on orders for these samples will be processed based on acceptable specimen integrity and analyte stability, which may vary by analyte.    CBC WITH AUTOMATED DIFF    Collection Time: 01/24/21  8:50 AM   Result Value Ref Range    WBC 7.0 4.1 - 11.1 K/uL    RBC 4.16 4.10 - 5.70 M/uL    HGB 12.4 12.1 - 17.0 g/dL    HCT 39.2 36.6 - 50.3 %    MCV 94.2 80.0 - 99.0 FL    MCH 29.8 26.0 - 34.0 PG    MCHC 31.6 30.0 - 36.5 g/dL    RDW 17.4 (H) 11.5 - 14.5 %    PLATELET 743 (L) 990 - 400 K/uL    MPV 11.0 8.9 - 12.9 FL    NRBC 0.0 0  WBC    ABSOLUTE NRBC 0.00 0.00 - 0.01 K/uL    NEUTROPHILS 84 (H) 32 - 75 %    LYMPHOCYTES 10 (L) 12 - 49 %    MONOCYTES 6 5 - 13 %    EOSINOPHILS 0 0 - 7 %    BASOPHILS 0 0 - 1 %    IMMATURE GRANULOCYTES 0 0.0 - 0.5 %    ABS. NEUTROPHILS 5.9 1.8 - 8.0 K/UL    ABS. LYMPHOCYTES 0.7 (L) 0.8 - 3.5 K/UL    ABS. MONOCYTES 0.4 0.0 - 1.0 K/UL    ABS. EOSINOPHILS 0.0 0.0 - 0.4 K/UL    ABS. BASOPHILS 0.0 0.0 - 0.1 K/UL    ABS. IMM. GRANS. 0.0 0.00 - 0.04 K/UL    DF MANUAL      PLATELET COMMENTS CLUMPED PLATELETS      RBC COMMENTS ANISOCYTOSIS  1+        RBC COMMENTS OVALOCYTES  1+        RBC COMMENTS TEARDROP CELLS  PRESENT        WBC COMMENTS REACTIVE LYMPHS     METABOLIC PANEL, COMPREHENSIVE    Collection Time: 01/24/21  8:50 AM   Result Value Ref Range    Sodium 139 136 - 145 mmol/L    Potassium 3.4 (L) 3.5 - 5.1 mmol/L    Chloride 108 97 - 108 mmol/L    CO2 24 21 - 32 mmol/L    Anion gap 7 5 - 15 mmol/L    Glucose 82 65 - 100 mg/dL    BUN 13 6 - 20 MG/DL    Creatinine 0.98 0.70 - 1.30 MG/DL    BUN/Creatinine ratio 13 12 - 20      GFR est AA >60 >60 ml/min/1.73m2    GFR est non-AA >60 >60 ml/min/1.73m2    Calcium 8.6 8.5 - 10.1 MG/DL    Bilirubin, total 0.5 0.2 - 1.0 MG/DL    ALT (SGPT) 31 12 - 78 U/L    AST (SGOT) 28 15 - 37 U/L    Alk.  phosphatase 73 45 - 117 U/L    Protein, total 7.1 6.4 - 8.2 g/dL    Albumin 3.2 (L) 3.5 - 5.0 g/dL    Globulin 3.9 2.0 - 4.0 g/dL    A-G Ratio 0.8 (L) 1.1 - 2.2     URINALYSIS W/ REFLEX CULTURE    Collection Time: 01/24/21  8:50 AM    Specimen: Urine   Result Value Ref Range    Color YELLOW/STRAW      Appearance CLEAR CLEAR      Specific gravity 1.006 1.003 - 1.030      pH (UA) 6.0 5.0 - 8.0      Protein Negative NEG mg/dL    Glucose Negative NEG mg/dL    Ketone Negative NEG mg/dL Bilirubin Negative NEG      Blood LARGE (A) NEG      Urobilinogen 0.2 0.2 - 1.0 EU/dL    Nitrites Negative NEG      Leukocyte Esterase TRACE (A) NEG      WBC 0-4 0 - 4 /hpf    RBC >100 (H) 0 - 5 /hpf    Epithelial cells FEW FEW /lpf    Bacteria Negative NEG /hpf    UA:UC IF INDICATED CULTURE NOT INDICATED BY UA RESULT CNI      Hyaline cast 0-2 0 - 5 /lpf         Signed By: Lucia Morrow NP - January 25, 2021

## 2021-01-25 NOTE — PROGRESS NOTES
[facility]: MED RECONCILIATION    Comments/Recommendations:   Patient was a good historian of his home medications. Patient stated that he had stopped taking his tamsulosin 0.4 mg at home because he did not think that it was doing anything and had discussed this with his physician. Patient stated that he is considering resuming tamsulosin 0.4 mg. Counseled the patient on the effect Ibuprofen can have on his blood pressure and recommended that he minimize ibuprofen use. Medications added:     Vitamin B12 1000 mcg  Vitamin D3 1000 units   Ibuprofen 200 mg   Tylenol #3    Medications removed:    Zolpidem 5 mg   Prednisone 20 mg     Medications adjusted:    Metoprolol tartrate 25 mg     Information obtained from: Patient and [facility]     Allergies: Patient has no known allergies. Prior to Admission Medications:     Medication Documentation Review Audit       Reviewed by Brandon Christie (Pharmacy Student) on 01/25/21 at 5887      Medication Sig Documenting Provider Last Dose Status Taking?   acetaminophen-codeine (Tylenol-Codeine #3) 300-30 mg per tablet Take 1 Tab by mouth every six (6) hours as needed for Pain. Indications: pain Provider, Historical 1/23/2021 Active Yes   ascorbic acid, vitamin C, (VITAMIN C) 1,000 mg tablet Take 1,000 mg by mouth daily. Provider, Historical 1/18/2021 Unknown time Active Yes   cholecalciferol (Vitamin D3) (1000 Units /25 mcg) tablet Take 1,000 Units by mouth daily. Provider, Historical 1/23/2021 Active Yes   cyanocobalamin (Vitamin B-12) 1,000 mcg tablet Take 1,000 mcg by mouth daily. Provider, Historical 1/23/2021 Active Yes   hydroxypropyl methylcellulose (ISOPTO TEARS) 0.5 % ophthalmic solution Administer 1 Drop to both eyes nightly. Provider, Historical 1/23/2021 Unknown time Active Yes   ibuprofen (MOTRIN) 200 mg tablet Take 400 mg by mouth every twelve (12) hours as needed for Pain.  Provider, Historical 1/18/2021 Unknown time Active Yes   metoprolol succinate (TOPROL-XL) 25 mg XL tablet Take 12.5 mg by mouth daily. Provider, Historical 1/24/2021 Active Yes   simvastatin (ZOCOR) 40 mg tablet TAKE 1 TABLET BY MOUTH EVERYDAY AT BEDTIME Baron Dennis MD 1/23/2021 Active Yes   tamsulosin (FLOMAX) 0.4 mg capsule TAKE 2 CAPSULES BY MOUTH EVERY DAY Steven Chavarria MD  Active Yes   therapeutic multivitamin [facility]) tablet Take 1 Tab by mouth daily. Provider, Historical 1/23/2021 Active Yes   triamcinolone acetonide (KENALOG) 0.1 % topical cream Apply  to affected area two (2) times daily as needed for Skin Irritation. use thin layer Baron Dennis MD 1/23/2021 Unknown time Active Yes                      José Miguel Grimes PharmD.  Candidate 2021

## 2021-01-26 VITALS
RESPIRATION RATE: 16 BRPM | WEIGHT: 225 LBS | TEMPERATURE: 97.8 F | OXYGEN SATURATION: 95 % | DIASTOLIC BLOOD PRESSURE: 70 MMHG | HEART RATE: 82 BPM | SYSTOLIC BLOOD PRESSURE: 135 MMHG | BODY MASS INDEX: 28.88 KG/M2 | HEIGHT: 74 IN

## 2021-01-26 LAB
ALBUMIN SERPL-MCNC: 2.8 G/DL (ref 3.5–5)
ANION GAP SERPL CALC-SCNC: 6 MMOL/L (ref 5–15)
BASOPHILS # BLD: 0 K/UL (ref 0–0.1)
BASOPHILS NFR BLD: 0 % (ref 0–1)
BUN SERPL-MCNC: 13 MG/DL (ref 6–20)
BUN/CREAT SERPL: 15 (ref 12–20)
CALCIUM SERPL-MCNC: 8.4 MG/DL (ref 8.5–10.1)
CHLORIDE SERPL-SCNC: 112 MMOL/L (ref 97–108)
CO2 SERPL-SCNC: 23 MMOL/L (ref 21–32)
CREAT SERPL-MCNC: 0.89 MG/DL (ref 0.7–1.3)
DATE LAST DOSE: ABNORMAL
DIFFERENTIAL METHOD BLD: ABNORMAL
EOSINOPHIL # BLD: 0.1 K/UL (ref 0–0.4)
EOSINOPHIL NFR BLD: 1 % (ref 0–7)
ERYTHROCYTE [DISTWIDTH] IN BLOOD BY AUTOMATED COUNT: 17.2 % (ref 11.5–14.5)
GLUCOSE SERPL-MCNC: 99 MG/DL (ref 65–100)
HCT VFR BLD AUTO: 37.7 % (ref 36.6–50.3)
HGB BLD-MCNC: 11.8 G/DL (ref 12.1–17)
IMM GRANULOCYTES # BLD AUTO: 0.1 K/UL (ref 0–0.04)
IMM GRANULOCYTES NFR BLD AUTO: 1 % (ref 0–0.5)
LYMPHOCYTES # BLD: 1.3 K/UL (ref 0.8–3.5)
LYMPHOCYTES NFR BLD: 21 % (ref 12–49)
MAGNESIUM SERPL-MCNC: 2.2 MG/DL (ref 1.6–2.4)
MCH RBC QN AUTO: 29.6 PG (ref 26–34)
MCHC RBC AUTO-ENTMCNC: 31.3 G/DL (ref 30–36.5)
MCV RBC AUTO: 94.7 FL (ref 80–99)
MONOCYTES # BLD: 0.6 K/UL (ref 0–1)
MONOCYTES NFR BLD: 9 % (ref 5–13)
NEUTS SEG # BLD: 4.3 K/UL (ref 1.8–8)
NEUTS SEG NFR BLD: 68 % (ref 32–75)
NRBC # BLD: 0 K/UL (ref 0–0.01)
NRBC BLD-RTO: 0 PER 100 WBC
PHOSPHATE SERPL-MCNC: 3.1 MG/DL (ref 2.6–4.7)
PLATELET # BLD AUTO: 150 K/UL (ref 150–400)
PMV BLD AUTO: 11.2 FL (ref 8.9–12.9)
POTASSIUM SERPL-SCNC: 3.8 MMOL/L (ref 3.5–5.1)
RBC # BLD AUTO: 3.98 M/UL (ref 4.1–5.7)
REPORTED DOSE,DOSE: ABNORMAL UNITS
REPORTED DOSE/TIME,TMG: 1500
SODIUM SERPL-SCNC: 141 MMOL/L (ref 136–145)
VANCOMYCIN TROUGH SERPL-MCNC: 19 UG/ML (ref 5–10)
WBC # BLD AUTO: 6.3 K/UL (ref 4.1–11.1)

## 2021-01-26 PROCEDURE — 74011000250 HC RX REV CODE- 250: Performed by: INTERNAL MEDICINE

## 2021-01-26 PROCEDURE — 85025 COMPLETE CBC W/AUTO DIFF WBC: CPT

## 2021-01-26 PROCEDURE — 80202 ASSAY OF VANCOMYCIN: CPT

## 2021-01-26 PROCEDURE — 97116 GAIT TRAINING THERAPY: CPT

## 2021-01-26 PROCEDURE — 36415 COLL VENOUS BLD VENIPUNCTURE: CPT

## 2021-01-26 PROCEDURE — 74011250637 HC RX REV CODE- 250/637: Performed by: INTERNAL MEDICINE

## 2021-01-26 PROCEDURE — 74011250636 HC RX REV CODE- 250/636: Performed by: INTERNAL MEDICINE

## 2021-01-26 PROCEDURE — 97535 SELF CARE MNGMENT TRAINING: CPT

## 2021-01-26 PROCEDURE — 80069 RENAL FUNCTION PANEL: CPT

## 2021-01-26 PROCEDURE — 83735 ASSAY OF MAGNESIUM: CPT

## 2021-01-26 PROCEDURE — 97530 THERAPEUTIC ACTIVITIES: CPT

## 2021-01-26 RX ORDER — CEFDINIR 300 MG/1
300 CAPSULE ORAL 2 TIMES DAILY
Qty: 14 CAP | Refills: 0 | Status: SHIPPED | OUTPATIENT
Start: 2021-01-26 | End: 2021-02-02

## 2021-01-26 RX ORDER — CEFDINIR 300 MG/1
300 CAPSULE ORAL 2 TIMES DAILY
Qty: 14 CAP | Refills: 0 | Status: SHIPPED | OUTPATIENT
Start: 2021-01-26 | End: 2021-01-26 | Stop reason: SDUPTHER

## 2021-01-26 RX ADMIN — VANCOMYCIN HYDROCHLORIDE 1500 MG: 10 INJECTION, POWDER, LYOPHILIZED, FOR SOLUTION INTRAVENOUS at 02:58

## 2021-01-26 RX ADMIN — METOPROLOL SUCCINATE 25 MG: 25 TABLET, EXTENDED RELEASE ORAL at 08:44

## 2021-01-26 RX ADMIN — FAMOTIDINE 20 MG: 20 TABLET, FILM COATED ORAL at 08:42

## 2021-01-26 RX ADMIN — ENOXAPARIN SODIUM 40 MG: 40 INJECTION SUBCUTANEOUS at 08:47

## 2021-01-26 RX ADMIN — ATORVASTATIN CALCIUM 20 MG: 20 TABLET, FILM COATED ORAL at 08:40

## 2021-01-26 RX ADMIN — TAMSULOSIN HYDROCHLORIDE 0.4 MG: 0.4 CAPSULE ORAL at 08:45

## 2021-01-26 RX ADMIN — CEFEPIME 2 G: 2 INJECTION, POWDER, FOR SOLUTION INTRAVENOUS at 04:44

## 2021-01-26 RX ADMIN — CEFEPIME 2 G: 2 INJECTION, POWDER, FOR SOLUTION INTRAVENOUS at 11:34

## 2021-01-26 NOTE — DISCHARGE SUMMARY
Physician Discharge Summary     Patient ID:  Vianney Man  [id number]  16 y.o.  1940    Admit date: 1/24/2021    Discharge date: 1/26/2021    Admission Diagnoses: Orchitis of right testicle [N45.2]    Principal Discharge Diagnoses:    orchitis    OTHER PROBLEMS ADDRESSEDS  Principal Diagnosis <principal problem not specified>                                            Active Problems:    Orchitis of right testicle (1/24/2021)       Patient Active Problem List   Diagnosis Code    Phantom pain ([address] G54.6    Hypercholesterolemia E78.00    CAD in native artery I25.10    Post-polio syndrome G14    BPH (benign prostatic hyperplasia) N40.0    Insomnia G47.00    Advanced care planning/counseling discussion Z70.80    DNR (do not resuscitate) Z73    UTI (urinary tract infection) N39.0    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 Ismael Olivares is a pleasant 79 yo hx of HTN, CAD, post-polio syndrome presenting w/ R testicular pain, found to have R orchitis      Orchitis of right testicle/testicular pain: failed outpatient levaquin. Much better after treating with IV abx IV Vanc/cefepime. Obtained UCx results from outpatient UA. Near-pan sensitive Klebsiella. As pt did not do well after Levaquin, will use Omnicef at discharge. Cont Flomax. Follow up with urology    HTN/CAD: cont metoprolol, statin. Not on ASA     3) Post-polio syndrome: s/p L arm amputation     Pt discharged in improved and stable condition. Procedures performed: see above    Imaging studies: see above  Us Scrotum/testicles    Result Date: 1/24/2021  Right-sided orchitis with reactive hydrocele.        PCP: Savanna Colmenares MD    Consults: Urology    Discharge Exam:  Patient Vitals for the past 24 hrs:   Temp Pulse Resp BP SpO2   01/26/21 1607 97.8 °F (36.6 °C) 82 16 135/70 95 %     GEN: NAD, very pleasant  CV: RRR, no MRG  RESP: CTAB  ABD: NTTP      Disposition: home    Patient Instructions:   Current Discharge Medication List      START taking these medications    Details   cefdinir (OMNICEF) 300 mg capsule Take 1 Cap by mouth two (2) times a day for 7 days. Qty: 14 Cap, Refills: 0         CONTINUE these medications which have NOT CHANGED    Details   metoprolol succinate (TOPROL-XL) 25 mg XL tablet Take 12.5 mg by mouth daily. cyanocobalamin (Vitamin B-12) 1,000 mcg tablet Take 1,000 mcg by mouth daily. cholecalciferol (Vitamin D3) (1000 Units /25 mcg) tablet Take 1,000 Units by mouth daily. ibuprofen (MOTRIN) 200 mg tablet Take 400 mg by mouth every twelve (12) hours as needed for Pain. acetaminophen-codeine (Tylenol-Codeine #3) 300-30 mg per tablet Take 1 Tab by mouth every six (6) hours as needed for Pain. Indications: pain      triamcinolone acetonide (KENALOG) 0.1 % topical cream Apply  to affected area two (2) times daily as needed for Skin Irritation. use thin layer  Qty: 30 g, Refills: 5    Associated Diagnoses: Dermatitis      tamsulosin (FLOMAX) 0.4 mg capsule TAKE 2 CAPSULES BY MOUTH EVERY DAY  Qty: 180 Cap, Refills: 1      simvastatin (ZOCOR) 40 mg tablet TAKE 1 TABLET BY MOUTH EVERYDAY AT BEDTIME  Qty: 90 Tab, Refills: 1    Comments: DX Code Needed  . Associated Diagnoses: Hypercholesterolemia      ascorbic acid, vitamin C, (VITAMIN C) 1,000 mg tablet Take 1,000 mg by mouth daily. therapeutic multivitamin (THERAGRAN) tablet Take 1 Tab by mouth daily. hydroxypropyl methylcellulose (ISOPTO TEARS) 0.5 % ophthalmic solution Administer 1 Drop to both eyes nightly.              Activity: See discharge instructions  Diet: See discharge instructions  Wound Care: See discharge instructions    Follow-up Information     Follow up With Specialties Details Why [address] [facility] On 2/8/2021 Dr. Cyrus Osullivan at 10:10am [address]  Patrick Maira MD Internal Medicine   [address] [place] [address]  [phone]            I spent 35 minutes on this discharge. Signed:  Jody Lechuga MD  1/26/2021  12:45 PM    CC: PCP Dr. Eli Ivey   .

## 2021-01-26 NOTE — PROGRESS NOTES
Urology Progress Note    Patient: Maddie Hernandez MRN: [id number]  SSN: [id number]    YOB: 1940  Age: 78 y.o. Sex: male            Assessment:     Maddie Hernandez is a 78 y.o. male admitted for right orchitis refractory to Levaquin. He does have a history of BPH, was not taking tamsulosin recently. Plan:     1. Recommend obtaining culture results from [facility] for culture directed antibiotics. 2. Pain control per primary team. Encouraged PO pain medication. 3. Continue flomax. 4. Elevate scrotum. 5. Ok to discharge when medically ready. Follow up scheduled for 2/8/2021 and information added to DC instructions. Will sign off. Please call if needed. Subjective:     Patient is frustrated with hospital care and again states he wants to go home. He has been afebrile. PVR yesterday 200cc. Says right testicular pain somewhat better. Urine culture negative. [address] called him yesterday and told him to continue Levaquin based on culture results. Unsure what the results were.      Objective:     Visit Vitals  BP (!) 153/78 (BP 1 Location: Right arm, BP Patient Position: At rest)   Pulse 70   Temp 97.8 °F (36.6 °C)   Resp 16   Ht 6' 1.5\" (1.867 m)   Wt 102.1 kg (225 lb)   SpO2 93%   BMI 29.28 kg/m²         Intake/Output Summary (Last 24 hours) at 1/26/2021 2263  Last data filed at 1/25/2021 1940  Gross per 24 hour   Intake 720 ml   Output 100 ml   Net 620 ml       Physical Exam  General: NAD  Respiratory: no distress  Abdomen: soft, NTTP, nondistended, no suprapubic fullness or tenderness  : right scrotal and testicular swelling and unduration  Neuro: Appropriate, no focal neurological deficits  Mood/Affect: normal    Recent Results (from the past 24 hour(s))   VANCOMYCIN, TROUGH    Collection Time: 01/26/21  2:52 AM   Result Value Ref Range    Vancomycin,trough 19.0 (H) 5.0 - 10.0 ug/mL    Reported dose date 20210125      Reported dose time: 1500      Reported dose: 1500 MG UNITS   CBC WITH AUTOMATED DIFF    Collection Time: 01/26/21  2:52 AM   Result Value Ref Range    WBC 6.3 4.1 - 11.1 K/uL    RBC 3.98 (L) 4.10 - 5.70 M/uL    HGB 11.8 (L) 12.1 - 17.0 g/dL    HCT 37.7 36.6 - 50.3 %    MCV 94.7 80.0 - 99.0 FL    MCH 29.6 26.0 - 34.0 PG    MCHC 31.3 30.0 - 36.5 g/dL    RDW 17.2 (H) 11.5 - 14.5 %    PLATELET 093 364 - 323 K/uL    MPV 11.2 8.9 - 12.9 FL    NRBC 0.0 0  WBC    ABSOLUTE NRBC 0.00 0.00 - 0.01 K/uL    NEUTROPHILS 68 32 - 75 %    LYMPHOCYTES 21 12 - 49 %    MONOCYTES 9 5 - 13 %    EOSINOPHILS 1 0 - 7 %    BASOPHILS 0 0 - 1 %    IMMATURE GRANULOCYTES 1 (H) 0.0 - 0.5 %    ABS. NEUTROPHILS 4.3 1.8 - 8.0 K/UL    ABS. LYMPHOCYTES 1.3 0.8 - 3.5 K/UL    ABS. MONOCYTES 0.6 0.0 - 1.0 K/UL    ABS. EOSINOPHILS 0.1 0.0 - 0.4 K/UL    ABS. BASOPHILS 0.0 0.0 - 0.1 K/UL    ABS. IMM.  GRANS. 0.1 (H) 0.00 - 0.04 K/UL    DF AUTOMATED     RENAL FUNCTION PANEL    Collection Time: 01/26/21  2:52 AM   Result Value Ref Range    Sodium 141 136 - 145 mmol/L    Potassium 3.8 3.5 - 5.1 mmol/L    Chloride 112 (H) 97 - 108 mmol/L    CO2 23 21 - 32 mmol/L    Anion gap 6 5 - 15 mmol/L    Glucose 99 65 - 100 mg/dL    BUN 13 6 - 20 MG/DL    Creatinine 0.89 0.70 - 1.30 MG/DL    BUN/Creatinine ratio 15 12 - 20      GFR est AA >60 >60 ml/min/1.73m2    GFR est non-AA >60 >60 ml/min/1.73m2    Calcium 8.4 (L) 8.5 - 10.1 MG/DL    Phosphorus 3.1 2.6 - 4.7 MG/DL    Albumin 2.8 (L) 3.5 - 5.0 g/dL   MAGNESIUM    Collection Time: 01/26/21  2:52 AM   Result Value Ref Range    Magnesium 2.2 1.6 - 2.4 mg/dL       Signed By: Beatriz Gorman NP - January 26, 2021

## 2021-01-26 NOTE — DISCHARGE INSTRUCTIONS
HOSPITALIST DISCHARGE INSTRUCTIONS  NAME: Ney Braxton   :  1940   MRN:  [id number]     Date/Time:  2021 12:45 PM    ADMIT DATE: 2021     DISCHARGE DATE: 2021     PRINCIPAL DISCHARGE DIAGNOSES:  Orchitis     MEDICATIONS:  · It is important that you take the medication exactly as they are prescribed. Note the changes and additions to your medications. Be sure you understand these changes before you are discharged today. · Keep your medication in the bottles provided by the pharmacist and keep a list of the medication names, dosages, and times to be taken in your wallet. · Do not take other medications without consulting your doctor. Pain Management: per above medications    What to do at Home    Recommended diet:  Resume previous diet    Recommended activity: Activity as tolerated    If you experience any of the following symptoms then please call your primary care physician or return to the emergency room if you cannot get hold of your doctor:  Fever, chills, severe groin pain, testicular swelling, redness, abdominal pain, nausea, vomiting, diarrhea, confusion, weakness, falling, bleeding, severe chest pain, shortness of breath, or other severe concerning symptoms    Follow Up: Follow-up Information     Follow up With Specialties Details Why [facility]  On 2021 Dr. Donna Herron at 10:10am [address] 38  Shin Haddad MD Internal Medicine   [address] [place] [address]  [phone]              Information obtained by :  I understand that if any problems occur once I am at home I am to contact my physician. I understand and acknowledge receipt of the instructions indicated above.                                                                                                                                            Physician's or R.N.'s Signature Date/Time                                                                                                                                              Patient or Representative Signature                                                          Date/Time

## 2021-01-26 NOTE — PROGRESS NOTES
Problem: Self Care Deficits Care Plan (Adult)  Goal: *Acute Goals and Plan of Care (Insert Text)  Description: FUNCTIONAL STATUS PRIOR TO ADMISSION: Patient was modified independent using a power wheelchair outside of home for functional mobility, reports furniture walking in his aparment. 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 HOME SUPPORT: The patient lived with wife. They reside in [address] [place] [address] with elevator access. Occupational Therapy Goals  Initiated 1/25/2021  1. Patient will perform grooming with supervision/set-up within 7 day(s). 2.  Patient will perform upper body dressing with minimal assistance/contact guard assist within 7 day(s). 3.  Patient will perform lower body dressing with supervision/set-up within 7 day(s). 4.  Patient will perform toilet transfers with supervision/set-up within 7 day(s). 5.  Patient will perform all aspects of toileting with supervision/set-up within 7 day(s). 6.  Patient will participate in upper extremity therapeutic exercise/activities with supervision/set-up for 5 minutes within 7 day(s). 7.  Patient will utilize energy conservation techniques during functional activities with verbal cues within 7 day(s). Outcome: Progressing Towards Goal   OCCUPATIONAL THERAPY TREATMENT  Patient: Amy Matta (73 y.o. male)  Date: 1/26/2021  Diagnosis: Orchitis of right testicle [N45.2] <principal problem not specified>       Precautions:  fall  Chart, occupational therapy assessment, plan of care, and goals were reviewed. ASSESSMENT  Patient continues with skilled OT services and is progressing towards goals. Mr. Carrie Hodgkin was received in bed requesting to use the bathroom. Patient required CGA to transfer into the bathroom for ADL retraining.   Patient educated on safe transfer techniques, adaptive ADL techniques, and energy conservation techniques. Patient was able to toilet, dress, and complete standing grooming tasks with good tolerance. He returned to the chair and agreeable to remain up. Current Level of Function Impacting Discharge (ADLs): Patient required CGA for all transfers and setup for all ADLs. PLAN :  Patient continues to benefit from skilled intervention to address the above impairments. Continue treatment per established plan of care. to address goals. Recommend with staff:   Recommend patient be OOB to chair as frequently as tolerated; Goal of 3x/day for all meals for 60 minutes at a time. For toileting needs, recommend transfers to/from bathroom. Encourage patient involvement in personal care as able. Recommend next OT session: Continue towards set goals. Recommendation for discharge: (in order for the patient to meet his/her long term goals)  Occupational therapy at least 2 days/week in the home     This discharge recommendation:  Has been made in collaboration with the attending provider and/or case management    IF patient discharges home will need the following DME: none       SUBJECTIVE:   Patient agreeable to OT tx. OBJECTIVE DATA SUMMARY:   Cognitive/Behavioral Status:  Neurologic State: Alert  Orientation Level: Oriented X4  Cognition: Appropriate decision making; Appropriate for age attention/concentration; Appropriate safety awareness  Perception: Appears intact  Perseveration: No perseveration noted  Safety/Judgement: Awareness of environment    Functional Mobility and Transfers for ADLs:  Transfers:  Sit to Stand: Contact guard assistance  Functional Transfers  Toilet Transfer : Contact guard assistance     Balance:  Sitting: Intact  Standing: Intact; With support  Standing - Static: Constant support;Fair;Good  Standing - Dynamic : Constant support; Fair    ADL Intervention:    Upper Body Dressing Assistance  Dressing Assistance: Set-up  Hospital Gown: Set-up  Cues: Verbal cues provided    Lower Body Dressing Assistance  Dressing Assistance: Set-up  Underpants: Set-up  Socks: Set-up  Shoes with Cloth Laces: Set-up  Leg Crossed Method Used: Yes  Position Performed: Seated in chair    Toileting  Toileting Assistance: Set-up  Bladder Hygiene: Set-up  Bowel Hygiene: Set-up  Clothing Management: Set-up  Cues: Verbal cues provided    Cognitive Retraining  Safety/Judgement: Awareness of environment      Activity Tolerance:   Good    After treatment patient left in no apparent distress:   Supine in bed, Call bell within reach, and Bed / chair alarm activated    COMMUNICATION/COLLABORATION:   The patients plan of care was discussed with: Physical therapist, Registered nurse, and patient .      Lalo Drew OTR/L  Time Calculation: 24 mins

## 2021-01-26 NOTE — PROGRESS NOTES
[address] [address] Pharmacy Dosing Services: Antimicrobial Stewardship Daily Doc 1/26/2021    Consult for antibiotic dosing of Vancomycin by Dr. Maliha Motley  Indication: Orchitis (Tx failure levofloxacin per urology notes)  Day of Therapy 3    Ht Readings from Last 1 Encounters:   01/24/21 186.7 cm (73.5\")        Wt Readings from Last 1 Encounters:   01/24/21 102.1 kg (225 lb)      Vancomycin therapy:  Current maintenance dose: 1500 (mg) every 12 hours   Dose calculated to approximate a therapeutic trough of 15-20mcg/mL. Last trough level: Initial dosing   Plan for level / Adjustment in Therapy: Vancomycin level drawn ~1 hour early and \"true\" trough extrapolated to ~17.7 mcg/ml. Scr slightly increased this AM but overall stable. Will reduce dose to 1250 mg Q12 hours for anticipated therapeutic trough and  to avoid accumulation over time. Per MD notes pt needs urine Cx results from [facility] for Cx directed abx prior to discharge. Dose administration notes:   Doses given appropriately as scheduled    Date Dose & Interval Measured (mcg/mL) Extrapolated (mcg/mL)   ?1/26 ? 1500 mg Q12 hours ? 19 ?~17.7   ? ? ? ?   ? ? ? ? Non-Kinetic Antimicrobial Dosing Regimen:   Current Regimen:  Cefepime 2 g Q8 hrs  Recommendation: Dose appropriate for CrCl >60 ml/min  Dose administration notes:   Doses given appropriately as scheduled    Other Antimicrobial   (not dosed by pharmacist) None   Cultures 1/24 Urine: NG, F (UA clear)   Serum Creatinine Lab Results   Component Value Date/Time    Creatinine 0.89 01/26/2021 02:52 AM         Creatinine Clearance Estimated Creatinine Clearance: 81 mL/min (based on SCr of 0.89 mg/dL).      Temp Temp: 98.1 °F (36.7 °C)       WBC Lab Results   Component Value Date/Time    WBC 6.3 01/26/2021 02:52 AM        H/H Lab Results   Component Value Date/Time    HGB 11.8 (L) 01/26/2021 02:52 AM        Platelets    Lab Results   Component Value Date/Time    PLATELET 682 72/40/5332 02:52 AM Thanks,  Pharmacist FITO Gilmore Contact information: [phone]

## 2021-01-26 NOTE — PROGRESS NOTES
Appears to be ready for discharge. Need urine culture results from [facility], for culture-directed antibiotic therapy.

## 2021-01-26 NOTE — PROGRESS NOTES
1/26/2021  9:55 AM  RUR:  9%  Risk Level: [x]Low []Moderate []High  Value-based purchasing: [] Yes [x] No  Bundle patient: [] Yes [x] No   Specify:     Transition of care plan:  1. Potential discharge today. Awaiting medical clearance. 2. Home with family assistance as needed. 3. Medicare pt has received, reviewed, and signed 2nd IM letter informing them of their right to appeal the discharge. Signed copied has been placed on pt bedside chart. 4. Outpatient follow-up. 5. Pt's family to transport. Care Management Interventions  PCP Verified by CM: Yes(Port)  Mode of Transport at Discharge:  Other (see comment)(Family)  Transition of Care Consult (CM Consult): Discharge Planning  MyChart Signup: No  Discharge Durable Medical Equipment: No  Physical Therapy Consult: Yes  Occupational Therapy Consult: Yes  Speech Therapy Consult: No  Current Support Network: Lives with Spouse  Confirm Follow Up Transport: Family  Discharge Location  Discharge Placement: Home with family assistance

## 2021-01-26 NOTE — PROGRESS NOTES
Problem: Mobility Impaired (Adult and Pediatric)  Goal: *Acute Goals and Plan of Care (Insert Text)  Description: FUNCTIONAL STATUS PRIOR TO ADMISSION: Patient was modified independent using a single point cane for functional mobility. HOME SUPPORT PRIOR TO ADMISSION: The patient lived with spouse but did not require assist.    Physical Therapy Goals  Initiated 1/25/2021  1. Patient will move from supine to sit and sit to supine , scoot up and down, and roll side to side in bed with independence within 7 day(s). 2.  Patient will transfer from bed to chair and chair to bed with modified independence using the least restrictive device within 7 day(s). 3.  Patient will perform sit to stand with modified independence within 7 day(s). 4.  Patient will ambulate with modified independence for 200 feet with the least restrictive device within 7 day(s). Outcome: Progressing Towards Goal   PHYSICAL THERAPY TREATMENT  Patient: Maddie Hernandez (78 y.o. male)  Date: 1/26/2021  Diagnosis: Orchitis of right testicle [N45.2] <principal problem not specified>       Precautions:    Chart, physical therapy assessment, plan of care and goals were reviewed. ASSESSMENT  Patient continues with skilled PT services and is progressing towards goals. Pt received sitting up in chair. Pt stating is feeling much better with less testicular pain. Waffle cushion provided to improve sitting tolerance. 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 Pt stating and chart likewise concurring likely discharge home to [address] [place] [address] apt with wife. Assisted pt with donning of clothes needing Min A in sitting for LE dsg. Min A with sit to stand. Gait of 76' with SPC and Min to Mod Ax2. Noted path deviations and compromised balance. Pt stating furniture crawls and use of scooter at home. No falls to date. Tried richard-walker for addition 25' needing Min Ax2 with some improvement with stability, yet pt reluctant to approve order. Requesting [facility]. CM alerted to pt requests and refusals. Recommending HHPT, pt declining although needed to progress gait/balance. Pt left in NAD sitting comfortably in chair. Current Level of Function Impacting Discharge (mobility/balance): Min A/fair    Other factors to consider for discharge: per above         PLAN :  Patient continues to benefit from skilled intervention to address the above impairments. Continue treatment per established plan of care. to address goals. Recommendation for discharge: (in order for the patient to meet his/her long term goals)  Physical therapy at least 2 days/week in the home , yet pt declining    This discharge recommendation:  Has been made in collaboration with the attending provider and/or case management    IF patient discharges home will need the following DME: to be determined (TBD)       SUBJECTIVE:   Patient stated I am fine.     OBJECTIVE DATA SUMMARY:   Critical Behavior:  Neurologic State: Alert  Orientation Level: Oriented X4  Cognition: Appropriate decision making  Safety/Judgement: Awareness of environment, Fall prevention, Good awareness of safety precautions, Home safety, Insight into deficits  Functional Mobility Training:  Bed Mobility:                    Transfers:  Sit to Stand: Minimum assistance;Contact guard assistance;Assist x2  Stand to Sit: Contact guard assistance;Assist x2                             Balance:  Sitting: Intact  Standing: Impaired  Standing - Static: Constant support;Fair;Good  Standing - Dynamic : Constant support; Fair  Ambulation/Gait Training:  Distance (ft): 75 Feet (ft)(plus 25')  Assistive Device: Gait belt;Cane, straight;Walker richard  Ambulation - Level of Assistance: Minimal assistance;Assist x2        Gait Abnormalities: Decreased step clearance; Path deviations        Base of Support: Widened     Speed/Eloisa: Pace decreased (<100 feet/min); Slow  Step Length: Right shortened;Left shortened                  Activity Tolerance:   Good    After treatment patient left in no apparent distress:   Sitting in chair and Call bell within reach; pt agreeable to call for nursing for assist    COMMUNICATION/COLLABORATION:   The patients plan of care was discussed with: Registered nurse and Case management.      Glendy Cardoso, PT   Time Calculation: 28 mins

## 2021-01-27 ENCOUNTER — PATIENT OUTREACH (OUTPATIENT)
Dept: CASE MANAGEMENT | Age: 81
End: 2021-01-27

## 2021-01-27 DIAGNOSIS — N45.2 ORCHITIS: ICD-10-CM

## 2021-01-27 DIAGNOSIS — G54.6 PHANTOM PAIN (HCC): ICD-10-CM

## 2021-01-27 DIAGNOSIS — G54.6 PHANTOM PAIN (HCC): Primary | ICD-10-CM

## 2021-01-27 LAB
C TRACH DNA SPEC QL NAA+PROBE: NEGATIVE
N GONORRHOEA DNA SPEC QL NAA+PROBE: NEGATIVE
SAMPLE TYPE: NORMAL
SERVICE CMNT-IMP: NORMAL
SPECIMEN SOURCE: NORMAL

## 2021-01-27 RX ORDER — ACETAMINOPHEN AND CODEINE PHOSPHATE 300; 30 MG/1; MG/1
1 TABLET ORAL
Qty: 60 TAB | Refills: 0 | Status: SHIPPED | OUTPATIENT
Start: 2021-01-27 | End: 2021-02-11

## 2021-01-27 RX ORDER — ACETAMINOPHEN AND CODEINE PHOSPHATE 300; 30 MG/1; MG/1
1 TABLET ORAL
Qty: 60 TAB | Refills: 0 | Status: CANCELLED | OUTPATIENT
Start: 2021-01-27 | End: 2021-02-26

## 2021-01-27 RX ORDER — ACETAMINOPHEN AND CODEINE PHOSPHATE 300; 30 MG/1; MG/1
1 TABLET ORAL
Qty: 60 TAB | Refills: 0 | Status: SHIPPED | OUTPATIENT
Start: 2021-01-27 | End: 2021-01-27 | Stop reason: SDUPTHER

## 2021-01-27 NOTE — PROGRESS NOTES
Patient was admitted to [facility] on 1-24-21 and discharged on 1-26-21 for:    Hospital Problem List:  Orchitis of right testicle    Outreach made within 2 business days of discharge: Yes. [facility] discharge summary was not available at the time of patient outreach on 1-27-21. Discharge Challenges to be reviewed by the provider:   Additional needs identified to be addressed with provider:  yes  - Patient is requesting a refill on Tylenol-Codeine #3 300-30mg per tab, take one tab every six hours as needed for pain. - Patient reports he is utilizing a regular diet at home and states his appetite is good. - Patient states he has not had any falls in the last 12 months. COVID-19 related testing was not completed during this admission. Method of communication with provider : chart routing, marked with high priority, to Dr. Jasmin Jacob. Port/PCP and Jose Barnes LPN. Phone call to PCP office and left message for Jose Barnes LPN with [facility] Patient .         Component      Latest Ref Rng & Units 1/26/2021 1/25/2021 1/25/2021 1/24/2021           2:52 AM  6:11 AM  6:11 AM  8:50 AM   Chloride      97 - 108 mmol/L 112 (H)  111 (H) 108     Component      Latest Ref Rng & Units 1/26/2021 1/25/2021 1/25/2021 1/24/2021           2:52 AM  6:11 AM  6:11 AM  8:50 AM   Calcium      8.5 - 10.1 MG/DL 8.4 (L)  8.1 (L) 8.6     Component      Latest Ref Rng & Units 1/25/2021 1/24/2021           6:11 AM  8:50 AM   Protein, total      6.4 - 8.2 g/dL 6.3 (L) 7.1     Component      Latest Ref Rng & Units 1/26/2021 1/25/2021 1/25/2021 1/24/2021           2:52 AM  6:11 AM  6:11 AM  8:50 AM   Albumin      3.5 - 5.0 g/dL 2.8 (L)  2.8 (L) 3.2 (L)     Component      Latest Ref Rng & Units 1/25/2021 1/24/2021           6:11 AM  8:50 AM   A-G Ratio      1.1 - 2.2   0.8 (L) 0.8 (L)     Component      Latest Ref Rng & Units 1/26/2021 1/25/2021           2:52 AM  6:11 AM   RBC      4.10 - 5.70 M/uL 3.98 (L) 3.84 (L)   HGB      12.1 - 17.0 g/dL 11.8 (L) 11.5 (L)     Component      Latest Ref Rng & Units 2021           2:52 AM  6:11 AM   RDW      11.5 - 14.5 % 17.2 (H) 17.1 (H)     Component      Latest Ref Rng & Units 2021           2:52 AM   IMMATURE GRANULOCYTES      0.0 - 0.5 % 1 (H)     Component      Latest Ref Rng & Units 2021           2:52 AM   ABS. IMM. GRANS.      0.00 - 0.04 K/UL 0.1 (H)     Component      Latest Ref Rng & Units 2021           8:50 AM   Blood      NEG   LARGE (A)     Component      Latest Ref Rng & Units 2021           8:50 AM   Leukocyte Esterase      NEG   TRACE (A)     Component      Latest Ref Rng & Units 2021           8:50 AM   RBC      0 - 5 /hpf >100 (H)     Advance Care Planning:   Does patient have an Advance Directive:  yes; reviewed and current . Patient states his DDNR, that is dated 2018 and that is currently scanned into his chart, is a current ACP document. Was this a readmission? no   Patient stated reason for the admission: \"Swelling and pain. \"   Patients top risk factors for readmission: medical condition  Interventions to address risk factors: Education provided regarding signs/symptoms of pain and inflammation, patient verbalized an understanding. Care Transition Nurse (CTN) contacted the patient by telephone to perform post hospital discharge assessment. Verified name and  with patient as identifiers. Provided introduction to self, and explanation of the CTN role. CTN reviewed discharge instructions, medical action plan and red flags with patient who verbalized understanding. Patient given an opportunity to ask questions and does not have any further questions or concerns at this time. The patient agrees to contact the PCP office for questions related to their healthcare. Medication reconciliation was performed with patient, who verbalizes understanding of administration of home medications.  Advised obtaining a 90-day supply of all daily and as-needed medications. Referral to Pharm D needed: no     Home Health/Outpatient orders at discharge: [address]: n/a  Date of initial visit: [facility] ordered at discharge: none  [address] received: n/a    Covid Risk Education    Patient has following risk factors of: BPH, thrombocytopenia, and recent orichitis of right testicle per chart. Education provided regarding infection prevention, and signs and symptoms of COVID-19 and when to seek medical attention with patient who verbalized understanding. Discussed exposure protocols and quarantine From CDC: Are you at higher risk for severe illness?  and given an opportunity for questions and concerns. The patient agrees to contact the COVID-19 hotline [phone] or PCP office for questions related to COVID-19. For more information on steps you can take to protect yourself, see CDC's How to Protect Yourself     Patient/family/caregiver given information for [employer] and agrees to enroll no  Patient's preferred e-mail: declines  Patient's preferred phone number: declines    Discussed follow-up appointments. If no appointment was previously scheduled, appointment scheduling offered: yes  [address] follow up appointment(s): No future appointments. Non-[facility] follow up appointment(s): Please see below for appointment. Plan for follow-up call in 10-14 days based on severity of symptoms and risk factors. CTN provided contact information for future needs. Goals      Attends follow-up appointments as directed. 1-27-21: Patient has urology follow-up appointment with Dr. [facility] on 2-8-21 and patient states he will call the office of Dr. Sona Chavarria/PCP to schedule a telemed BREANNE appointment soon. Patient reports he utilizes transportation that is provided by [employer].  Deedee Payne Understands red flags post discharge. 1-27-21: Red flags of pain and inflammation reviewed with patient and patient verbalized an understanding. Patient rates right testicular pain #1, on 1-10 scale, at this time. Patient states he continues to have testicular swelling and it utilizing elevation with a folded washcloth with success in the decrease in testicular swelling. Patient denies fever/chills and denies nausea/vomiting since discharge on 1-26-21. Patient states I'm feeling better thank I was. \" Care Transitions Nurse will review red flags again on next phone conversation with patient.  Comfort Grewal

## 2021-01-27 NOTE — PROGRESS NOTES
He takes tylenol #3 for chronic pain #30/30 days. Refilled 1/2. I sent in 60 pills, 1 every 6 hours, for acute testicle pain.   Please f/u 2 weeks

## 2021-01-27 NOTE — ACP (ADVANCE CARE PLANNING)
1-27-21: Patient states his DDNR, that is dated 4- and that is currently scanned into his chart, is a current ACP document.  Iris Rahman

## 2021-01-27 NOTE — Clinical Note
Please see patient outreach dated 1-27-21. Patient is requesting a refill on Tylenol-Codeine #3 300-30mg per tab, take one tab every six hours as needed for pain. Patient declined to schedule VICKIE PELAYO appointment today; however patient states he will call PCP office soon to schedule.

## 2021-02-01 ENCOUNTER — TELEPHONE (OUTPATIENT)
Dept: INTERNAL MEDICINE CLINIC | Age: 81
End: 2021-02-01

## 2021-02-01 NOTE — TELEPHONE ENCOUNTER
----- Message from Kalie Jackson sent at 2/1/2021  9:42 AM EST -----  Regarding: Dr. Rihcard Wheat telephone  Job Lazo first and last name:self      Reason for call:  to inform MD was in ER for 3 days. Dx 1/26 for UTI/pain      Callback required yes/no and why: yes/ if want to do VV appt sooner      Best contact number(s): [phone]      Details to clarify the request: pt would rather have VV appt. Made appt for 2/10/21, if want to VV appt sooner, call pt. He is on the last of antibiotic and is feeling better.       Kalie Jackson

## 2021-02-10 ENCOUNTER — VIRTUAL VISIT (OUTPATIENT)
Dept: INTERNAL MEDICINE CLINIC | Age: 81
End: 2021-02-10
Payer: MEDICARE

## 2021-02-10 DIAGNOSIS — G54.6 PHANTOM PAIN (HCC): ICD-10-CM

## 2021-02-10 DIAGNOSIS — N39.0 UTI DUE TO KLEBSIELLA SPECIES: ICD-10-CM

## 2021-02-10 DIAGNOSIS — N45.2 ORCHITIS OF RIGHT TESTICLE: Primary | ICD-10-CM

## 2021-02-10 DIAGNOSIS — B96.89 UTI DUE TO KLEBSIELLA SPECIES: ICD-10-CM

## 2021-02-10 DIAGNOSIS — D69.6 THROMBOCYTOPENIA (HCC): ICD-10-CM

## 2021-02-10 PROCEDURE — 1111F DSCHRG MED/CURRENT MED MERGE: CPT | Performed by: INTERNAL MEDICINE

## 2021-02-10 PROCEDURE — G8419 CALC BMI OUT NRM PARAM NOF/U: HCPCS | Performed by: INTERNAL MEDICINE

## 2021-02-10 PROCEDURE — 99213 OFFICE O/P EST LOW 20 MIN: CPT | Performed by: INTERNAL MEDICINE

## 2021-02-10 PROCEDURE — 3288F FALL RISK ASSESSMENT DOCD: CPT | Performed by: INTERNAL MEDICINE

## 2021-02-10 PROCEDURE — G8536 NO DOC ELDER MAL SCRN: HCPCS | Performed by: INTERNAL MEDICINE

## 2021-02-10 PROCEDURE — G8756 NO BP MEASURE DOC: HCPCS | Performed by: INTERNAL MEDICINE

## 2021-02-10 PROCEDURE — 1100F PTFALLS ASSESS-DOCD GE2>/YR: CPT | Performed by: INTERNAL MEDICINE

## 2021-02-10 PROCEDURE — G0463 HOSPITAL OUTPT CLINIC VISIT: HCPCS | Performed by: INTERNAL MEDICINE

## 2021-02-10 PROCEDURE — G8432 DEP SCR NOT DOC, RNG: HCPCS | Performed by: INTERNAL MEDICINE

## 2021-02-10 PROCEDURE — G8428 CUR MEDS NOT DOCUMENT: HCPCS | Performed by: INTERNAL MEDICINE

## 2021-02-10 NOTE — PROGRESS NOTES
HISTORY OF PRESENT ILLNESS    Chief Complaint   Patient presents with    Urinary Frequency     F/u ER [facility] UTI       Presents for follow-up of hospital admission to [facility] from January 24 to January 26, 2021 for orchitis of the right testicle. Patient was initially seen by [facility] on January 19 for groin pain and dysuria. Was diagnosed with pansensitive Klebsiella UTI and orchitis. Started oral Levaquin. He began having progressive right testicular pain. Presented to the emergency room and ultrasound revealed right-sided orchitis with reactive hydrocele. He was started on IV vancomycin and cefepime. Repeat urine culture grew no infection. He was discharged with ANDREI CABALLERO and Flomax. Was asked to follow-up with urology. Since admission, testicular pain has almost completely resolved. Denies any dysuria. He had a video consultation with the urologist today and no further treatments are indicated but of course no exam was performed. Phantom pain syndrome. He has been prescribed Tylenol 3 for this over the years. He did have relatively acute pain from his orchitis and medication was refilled early by me. Last refilled 60 pills on January 27. He typically filled 30 pills every 2 to 3 months. Pain is back to baseline. Review of Systems   All other systems reviewed and are negative, except as noted in HPI    Past Medical and Surgical History   has a past medical history of BPH (benign prostatic hyperplasia), CAD in native artery, DNR (do not resuscitate) (4/17/2018), HTN (hypertension), diverticulitis of colon, Hypercholesterolemia, Melanoma ([address] Phantom pain ([address] Post-polio syndrome (age 4), Renal cyst, Right hip pain, Thrombocytopenia ([address] Venous insufficiency, Weakness of right leg, and Zoster (1982).    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 reports that he has never smoked. He has never used smokeless tobacco. He reports that he does not drink alcohol or use drugs. family history includes Diabetes in his mother. Physical Exam   Nursing note and vitals reviewed. There were no vitals taken for this visit. Constitutional:  No distress. Eyes: Conjunctivae are normal.   Ears:  Hearing grossly intact  Cardiovascular: Normal rate. regular rhythm, no murmurs or gallops  No edema  Pulmonary/Chest: Effort normal.   CTAB  Musculoskeletal: moves all 4 extremities   Neurological: Alert and oriented to person, place, and time. Skin: No visible rash noted. Psychiatric: Normal mood and affect. Behavior is normal.     ASSESSMENT and PLAN  Diagnoses and all orders for this visit:    1. Orchitis of right testicle  No exam was performed today. Symptomatically improved. Saw urology as well. 2. UTI due to Klebsiella species  Resolved status post antibiotics. 3. Phantom pain (HCC)  Controlled on current regimen. Will refill Tylenol 3 when needed. Typically takes 30 pills every 2 months or so. 4. Thrombocytopenia ([address]  Assessment & Plan:  Stable, based on history, physical exam and review of pertinent labs, studies and medications; meds reconciled; platelet count was near normal in the hospital.  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      lab results and schedule of future lab studies reviewed with patient  reviewed medications and side effects in detail    Return to clinic for further evaluation if new symptoms develop        Current Outpatient Medications   Medication Sig    acetaminophen-codeine (Tylenol-Codeine #3) 300-30 mg per tablet Take 1 Tab by mouth every six (6) hours as needed for Pain for up to 15 days. Max Daily Amount: 4 Tabs.  Indications: pain    metoprolol succinate (TOPROL-XL) 25 mg XL tablet Take 12.5 mg by mouth daily.    cyanocobalamin (Vitamin B-12) 1,000 mcg tablet Take 1,000 mcg by mouth daily.  ibuprofen (MOTRIN) 200 mg tablet Take 400 mg by mouth every twelve (12) hours as needed for Pain.  triamcinolone acetonide (KENALOG) 0.1 % topical cream Apply  to affected area two (2) times daily as needed for Skin Irritation. use thin layer    tamsulosin (FLOMAX) 0.4 mg capsule TAKE 2 CAPSULES BY MOUTH EVERY DAY    simvastatin (ZOCOR) 40 mg tablet TAKE 1 TABLET BY MOUTH EVERYDAY AT BEDTIME    ascorbic acid, vitamin C, (VITAMIN C) 1,000 mg tablet Take 1,000 mg by mouth daily.  therapeutic multivitamin (THERAGRAN) tablet Take 1 Tab by mouth daily.  hydroxypropyl methylcellulose (ISOPTO TEARS) 0.5 % ophthalmic solution Administer 1 Drop to both eyes nightly.  acetaminophen-codeine (Tylenol-Codeine #3) 300-30 mg per tablet Take 1 Tab by mouth every six (6) hours as needed for Pain for up to 15 days. Max Daily Amount: 4 Tabs. Indications: pain    cholecalciferol (Vitamin D3) (1000 Units /25 mcg) tablet Take 1,000 Units by mouth daily. No current facility-administered medications for this visit.

## 2021-04-05 DIAGNOSIS — E78.00 HYPERCHOLESTEROLEMIA: ICD-10-CM

## 2021-04-05 RX ORDER — SIMVASTATIN 40 MG/1
TABLET, FILM COATED ORAL
Qty: 90 TAB | Refills: 1 | Status: SHIPPED | OUTPATIENT
Start: 2021-04-05 | End: 2021-07-28 | Stop reason: SDUPTHER

## 2021-04-26 ENCOUNTER — APPOINTMENT (OUTPATIENT)
Dept: GENERAL RADIOLOGY | Age: 81
DRG: 871 | End: 2021-04-26
Attending: INTERNAL MEDICINE
Payer: MEDICARE

## 2021-04-26 ENCOUNTER — APPOINTMENT (OUTPATIENT)
Dept: CT IMAGING | Age: 81
DRG: 871 | End: 2021-04-26
Attending: INTERNAL MEDICINE
Payer: MEDICARE

## 2021-04-26 ENCOUNTER — APPOINTMENT (OUTPATIENT)
Dept: GENERAL RADIOLOGY | Age: 81
DRG: 871 | End: 2021-04-26
Attending: EMERGENCY MEDICINE
Payer: MEDICARE

## 2021-04-26 ENCOUNTER — HOSPITAL ENCOUNTER (INPATIENT)
Age: 81
LOS: 3 days | Discharge: HOME HEALTH | DRG: 871 | End: 2021-04-29
Attending: EMERGENCY MEDICINE | Admitting: INTERNAL MEDICINE
Payer: MEDICARE

## 2021-04-26 DIAGNOSIS — A41.9 SEVERE SEPSIS (HCC): Primary | ICD-10-CM

## 2021-04-26 DIAGNOSIS — Q61.3 POLYCYSTIC KIDNEY DISEASE: ICD-10-CM

## 2021-04-26 DIAGNOSIS — D69.6 THROMBOCYTOPENIA (HCC): ICD-10-CM

## 2021-04-26 DIAGNOSIS — N39.0 COMPLICATED UTI (URINARY TRACT INFECTION): ICD-10-CM

## 2021-04-26 DIAGNOSIS — R65.20 SEVERE SEPSIS (HCC): Primary | ICD-10-CM

## 2021-04-26 PROBLEM — J18.9 PNEUMONIA INVOLVING LEFT LUNG: Status: ACTIVE | Noted: 2021-04-26

## 2021-04-26 LAB
ALBUMIN SERPL-MCNC: 3.3 G/DL (ref 3.5–5)
ALBUMIN/GLOB SERPL: 1.1 {RATIO} (ref 1.1–2.2)
ALP SERPL-CCNC: 68 U/L (ref 45–117)
ALT SERPL-CCNC: 18 U/L (ref 12–78)
ANION GAP SERPL CALC-SCNC: 8 MMOL/L (ref 5–15)
APPEARANCE UR: ABNORMAL
AST SERPL-CCNC: 16 U/L (ref 15–37)
ATRIAL RATE: 97 BPM
BACTERIA URNS QL MICRO: ABNORMAL /HPF
BASOPHILS # BLD: 0 K/UL (ref 0–0.1)
BASOPHILS NFR BLD: 0 % (ref 0–1)
BILIRUB SERPL-MCNC: 0.7 MG/DL (ref 0.2–1)
BILIRUB UR QL: NEGATIVE
BUN SERPL-MCNC: 19 MG/DL (ref 6–20)
BUN/CREAT SERPL: 20 (ref 12–20)
CALCIUM SERPL-MCNC: 8 MG/DL (ref 8.5–10.1)
CALCULATED P AXIS, ECG09: 39 DEGREES
CALCULATED R AXIS, ECG10: 6 DEGREES
CALCULATED T AXIS, ECG11: -21 DEGREES
CHLORIDE SERPL-SCNC: 111 MMOL/L (ref 97–108)
CO2 SERPL-SCNC: 20 MMOL/L (ref 21–32)
COLOR UR: ABNORMAL
COMMENT, HOLDF: NORMAL
CREAT SERPL-MCNC: 0.95 MG/DL (ref 0.7–1.3)
DIAGNOSIS, 93000: NORMAL
DIFFERENTIAL METHOD BLD: ABNORMAL
EOSINOPHIL # BLD: 0 K/UL (ref 0–0.4)
EOSINOPHIL NFR BLD: 0 % (ref 0–7)
EPITH CASTS URNS QL MICRO: ABNORMAL /LPF
ERYTHROCYTE [DISTWIDTH] IN BLOOD BY AUTOMATED COUNT: 16.1 % (ref 11.5–14.5)
EST. AVERAGE GLUCOSE BLD GHB EST-MCNC: 103 MG/DL
GLOBULIN SER CALC-MCNC: 3.1 G/DL (ref 2–4)
GLUCOSE SERPL-MCNC: 142 MG/DL (ref 65–100)
GLUCOSE UR STRIP.AUTO-MCNC: NEGATIVE MG/DL
HBA1C MFR BLD: 5.2 % (ref 4–5.6)
HCT VFR BLD AUTO: 36.6 % (ref 36.6–50.3)
HGB BLD-MCNC: 11.4 G/DL (ref 12.1–17)
HGB UR QL STRIP: ABNORMAL
HYALINE CASTS URNS QL MICRO: ABNORMAL /LPF (ref 0–5)
IMM GRANULOCYTES # BLD AUTO: 0.4 K/UL (ref 0–0.04)
IMM GRANULOCYTES NFR BLD AUTO: 2 % (ref 0–0.5)
KETONES UR QL STRIP.AUTO: 15 MG/DL
LACTATE SERPL-SCNC: 1.3 MMOL/L (ref 0.4–2)
LEUKOCYTE ESTERASE UR QL STRIP.AUTO: ABNORMAL
LYMPHOCYTES # BLD: 0.4 K/UL (ref 0.8–3.5)
LYMPHOCYTES NFR BLD: 2 % (ref 12–49)
MCH RBC QN AUTO: 28.6 PG (ref 26–34)
MCHC RBC AUTO-ENTMCNC: 31.1 G/DL (ref 30–36.5)
MCV RBC AUTO: 91.7 FL (ref 80–99)
MONOCYTES # BLD: 1.1 K/UL (ref 0–1)
MONOCYTES NFR BLD: 6 % (ref 5–13)
NEUTS SEG # BLD: 16.6 K/UL (ref 1.8–8)
NEUTS SEG NFR BLD: 90 % (ref 32–75)
NITRITE UR QL STRIP.AUTO: POSITIVE
NRBC # BLD: 0 K/UL (ref 0–0.01)
NRBC BLD-RTO: 0 PER 100 WBC
P-R INTERVAL, ECG05: 186 MS
PH UR STRIP: 5.5 [PH] (ref 5–8)
PLATELET # BLD AUTO: 89 K/UL (ref 150–400)
PMV BLD AUTO: 11.7 FL (ref 8.9–12.9)
POTASSIUM SERPL-SCNC: 3.5 MMOL/L (ref 3.5–5.1)
PROT SERPL-MCNC: 6.4 G/DL (ref 6.4–8.2)
PROT UR STRIP-MCNC: 30 MG/DL
Q-T INTERVAL, ECG07: 360 MS
QRS DURATION, ECG06: 122 MS
QTC CALCULATION (BEZET), ECG08: 457 MS
RBC # BLD AUTO: 3.99 M/UL (ref 4.1–5.7)
RBC #/AREA URNS HPF: ABNORMAL /HPF (ref 0–5)
RBC MORPH BLD: ABNORMAL
SAMPLES BEING HELD,HOLD: NORMAL
SODIUM SERPL-SCNC: 139 MMOL/L (ref 136–145)
SP GR UR REFRACTOMETRY: 1.02 (ref 1–1.03)
TROPONIN I SERPL-MCNC: <0.05 NG/ML
UROBILINOGEN UR QL STRIP.AUTO: 0.2 EU/DL (ref 0.2–1)
VENTRICULAR RATE, ECG03: 97 BPM
WBC # BLD AUTO: 18.5 K/UL (ref 4.1–11.1)
WBC URNS QL MICRO: >100 /HPF (ref 0–4)

## 2021-04-26 PROCEDURE — 74011250637 HC RX REV CODE- 250/637: Performed by: INTERNAL MEDICINE

## 2021-04-26 PROCEDURE — 74176 CT ABD & PELVIS W/O CONTRAST: CPT

## 2021-04-26 PROCEDURE — 74011000258 HC RX REV CODE- 258: Performed by: INTERNAL MEDICINE

## 2021-04-26 PROCEDURE — 81001 URINALYSIS AUTO W/SCOPE: CPT

## 2021-04-26 PROCEDURE — 77010033678 HC OXYGEN DAILY

## 2021-04-26 PROCEDURE — 85025 COMPLETE CBC W/AUTO DIFF WBC: CPT

## 2021-04-26 PROCEDURE — 87186 SC STD MICRODIL/AGAR DIL: CPT

## 2021-04-26 PROCEDURE — 83036 HEMOGLOBIN GLYCOSYLATED A1C: CPT

## 2021-04-26 PROCEDURE — 87077 CULTURE AEROBIC IDENTIFY: CPT

## 2021-04-26 PROCEDURE — 36415 COLL VENOUS BLD VENIPUNCTURE: CPT

## 2021-04-26 PROCEDURE — 83605 ASSAY OF LACTIC ACID: CPT

## 2021-04-26 PROCEDURE — 71045 X-RAY EXAM CHEST 1 VIEW: CPT

## 2021-04-26 PROCEDURE — 74011250637 HC RX REV CODE- 250/637: Performed by: EMERGENCY MEDICINE

## 2021-04-26 PROCEDURE — 51798 US URINE CAPACITY MEASURE: CPT

## 2021-04-26 PROCEDURE — 74011250636 HC RX REV CODE- 250/636: Performed by: INTERNAL MEDICINE

## 2021-04-26 PROCEDURE — 84484 ASSAY OF TROPONIN QUANT: CPT

## 2021-04-26 PROCEDURE — 99285 EMERGENCY DEPT VISIT HI MDM: CPT

## 2021-04-26 PROCEDURE — 87040 BLOOD CULTURE FOR BACTERIA: CPT

## 2021-04-26 PROCEDURE — 80053 COMPREHEN METABOLIC PANEL: CPT

## 2021-04-26 PROCEDURE — 93005 ELECTROCARDIOGRAM TRACING: CPT

## 2021-04-26 PROCEDURE — 65660000000 HC RM CCU STEPDOWN

## 2021-04-26 PROCEDURE — 87086 URINE CULTURE/COLONY COUNT: CPT

## 2021-04-26 RX ORDER — SODIUM CHLORIDE 0.9 % (FLUSH) 0.9 %
5-40 SYRINGE (ML) INJECTION AS NEEDED
Status: DISCONTINUED | OUTPATIENT
Start: 2021-04-26 | End: 2021-04-29 | Stop reason: HOSPADM

## 2021-04-26 RX ORDER — POLYETHYLENE GLYCOL 3350 17 G/17G
17 POWDER, FOR SOLUTION ORAL DAILY PRN
Status: DISCONTINUED | OUTPATIENT
Start: 2021-04-26 | End: 2021-04-29 | Stop reason: HOSPADM

## 2021-04-26 RX ORDER — SODIUM CHLORIDE 0.9 % (FLUSH) 0.9 %
5-40 SYRINGE (ML) INJECTION EVERY 8 HOURS
Status: DISCONTINUED | OUTPATIENT
Start: 2021-04-26 | End: 2021-04-29 | Stop reason: HOSPADM

## 2021-04-26 RX ORDER — ACETAMINOPHEN 325 MG/1
650 TABLET ORAL ONCE
Status: COMPLETED | OUTPATIENT
Start: 2021-04-26 | End: 2021-04-26

## 2021-04-26 RX ORDER — PROMETHAZINE HYDROCHLORIDE 25 MG/1
12.5 TABLET ORAL
Status: DISCONTINUED | OUTPATIENT
Start: 2021-04-26 | End: 2021-04-29 | Stop reason: HOSPADM

## 2021-04-26 RX ORDER — ACETAMINOPHEN 325 MG/1
650 TABLET ORAL
Status: DISCONTINUED | OUTPATIENT
Start: 2021-04-26 | End: 2021-04-29 | Stop reason: HOSPADM

## 2021-04-26 RX ORDER — ACETAMINOPHEN 325 MG/1
650 TABLET ORAL
COMMUNITY

## 2021-04-26 RX ORDER — ACETAMINOPHEN 650 MG/1
650 SUPPOSITORY RECTAL
Status: DISCONTINUED | OUTPATIENT
Start: 2021-04-26 | End: 2021-04-29 | Stop reason: HOSPADM

## 2021-04-26 RX ORDER — ONDANSETRON 2 MG/ML
4 INJECTION INTRAMUSCULAR; INTRAVENOUS
Status: DISCONTINUED | OUTPATIENT
Start: 2021-04-26 | End: 2021-04-29 | Stop reason: HOSPADM

## 2021-04-26 RX ORDER — ACETAMINOPHEN AND CODEINE PHOSPHATE 300; 30 MG/1; MG/1
1 TABLET ORAL
COMMUNITY
End: 2021-11-02 | Stop reason: SDUPTHER

## 2021-04-26 RX ORDER — ENOXAPARIN SODIUM 100 MG/ML
40 INJECTION SUBCUTANEOUS DAILY
Status: DISCONTINUED | OUTPATIENT
Start: 2021-04-26 | End: 2021-04-28

## 2021-04-26 RX ORDER — POLYVINYL ALCOHOL 14 MG/ML
1 SOLUTION/ DROPS OPHTHALMIC AS NEEDED
COMMUNITY

## 2021-04-26 RX ADMIN — ACETAMINOPHEN 650 MG: 325 TABLET ORAL at 13:50

## 2021-04-26 RX ADMIN — ONDANSETRON 4 MG: 2 INJECTION INTRAMUSCULAR; INTRAVENOUS at 13:51

## 2021-04-26 RX ADMIN — Medication 10 ML: at 16:59

## 2021-04-26 RX ADMIN — ACETAMINOPHEN 650 MG: 325 TABLET ORAL at 22:10

## 2021-04-26 RX ADMIN — PIPERACILLIN AND TAZOBACTAM 3.38 G: 3; .375 INJECTION, POWDER, LYOPHILIZED, FOR SOLUTION INTRAVENOUS at 17:18

## 2021-04-26 RX ADMIN — PIPERACILLIN AND TAZOBACTAM 3.38 G: 3; .375 INJECTION, POWDER, LYOPHILIZED, FOR SOLUTION INTRAVENOUS at 09:59

## 2021-04-26 RX ADMIN — Medication 10 ML: at 22:18

## 2021-04-26 RX ADMIN — ENOXAPARIN SODIUM 40 MG: 40 INJECTION SUBCUTANEOUS at 09:58

## 2021-04-26 RX ADMIN — ACETAMINOPHEN 650 MG: 325 TABLET ORAL at 07:53

## 2021-04-26 NOTE — ED PROVIDER NOTES
57-year-old gentleman with history of BPH, coronary disease, hypertension, post polio left arm amputation, thrombocytopenia presents to the emergency department today with chief complaint of generalized weakness and fatigue with myalgias since yesterday. She denies any chest pain or pressure or shortness of breath. No abdominal pain or vomiting. He endorses nausea. He does not use oxygen at home. The history is provided by the patient and medical records. Fatigue  This is a new problem. The current episode started yesterday. The problem has been rapidly worsening. There was no focality noted. Pertinent negatives include no focal weakness, no loss of sensation, no loss of balance, no slurred speech, no speech difficulty, no movement disorder, no agitation, no visual change, no mental status change, no unresponsiveness and no disorientation. There has been no fever. Associated symptoms include nausea. Pertinent negatives include no shortness of breath, no chest pain, no vomiting, no altered mental status, no confusion, no headaches, no bowel incontinence and no bladder incontinence. There were no medications administered prior to arrival. Associated medical issues do not include trauma. Past Medical History:   Diagnosis Date    BPH (benign prostatic hyperplasia)     saw urology in [place] CAD in native artery     s/p stent x2. saw Dr. Sera Marley in [place]    DNR (do not resuscitate) 4/17/2018    HTN (hypertension)     Hx of diverticulitis of colon     Hypercholesterolemia     Melanoma ([facility]     x4. one present on L shoulder stump. 2013. superficial per pt    Phantom pain ([address]     L arm    Post-polio syndrome age 4    dx 2004 [place]. He still drives. weakness, R deltoid weakness, s/p L arm amputation, R hip pain, R leg-ankle brace    Renal cyst     8 cm, increasing 20 years    Right hip pain     chronic, polio    Thrombocytopenia (HCC)     chronic. clumping seen 1/2021.     Venous insufficiency     s/p venous stripping    Weakness of right leg     Zoster 1982       Past Surgical History:   Procedure Laterality Date    HX AMPUTATION Left 1953    arm, from polio, phantom pain    HX APPENDECTOMY      HX COLONOSCOPY  2008?  HX CORONARY STENT PLACEMENT  2006    HX CORONARY STENT PLACEMENT  2004? 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 Social Needs    Financial resource strain: Not on file    Food insecurity     Worry: Not on file     Inability: Not on file    Transportation needs     Medical: Not on file     Non-medical: Not on file   Tobacco Use    Smoking status: Never Smoker    Smokeless tobacco: Never Used   Substance and Sexual Activity    Alcohol use: No    Drug use: Never    Sexual activity: Yes   Lifestyle    Physical activity     Days per week: Not on file     Minutes per session: Not on file    Stress: Not on file   Relationships    Social connections     Talks on phone: Not on file     Gets together: Not on file     Attends [employer] service: Not on file     Active member of club or organization: Not on file     Attends meetings of clubs or organizations: Not on file     Relationship status: Not on file    Intimate partner violence     Fear of current or ex partner: Not on file     Emotionally abused: Not on file     Physically abused: Not on file     Forced sexual activity: Not on file   Other Topics Concern    Not on file   Social History Narrative    Not on file         ALLERGIES: Patient has no known allergies.     Review of Systems   Constitutional: Positive for chills and fatigue. Negative for fever. HENT: Negative for sneezing and sore throat. Respiratory: Negative for cough and shortness of breath. Cardiovascular: Negative for chest pain and leg swelling. Gastrointestinal: Positive for nausea. Negative for abdominal pain, bowel incontinence, diarrhea and vomiting. Genitourinary: Negative for bladder incontinence, difficulty urinating and dysuria. Musculoskeletal: Negative for arthralgias and myalgias. Skin: Negative for color change and rash. Neurological: Positive for weakness (Generalized). Negative for focal weakness, speech difficulty, headaches and loss of balance. Psychiatric/Behavioral: Negative for agitation, behavioral problems and confusion. There were no vitals filed for this visit. Physical Exam  Vitals signs and nursing note reviewed. Constitutional:       General: He is not in acute distress. Appearance: He is well-developed. He is ill-appearing. He is not toxic-appearing or diaphoretic. HENT:      Head: Normocephalic and atraumatic. Nose: Nose normal.      Mouth/Throat:      Mouth: Mucous membranes are moist.      Pharynx: Oropharynx is clear. Eyes:      Extraocular Movements: Extraocular movements intact. Conjunctiva/sclera: Conjunctivae normal.      Pupils: Pupils are equal, round, and reactive to light. Neck:      Musculoskeletal: Normal range of motion and neck supple. No neck rigidity or muscular tenderness. Cardiovascular:      Rate and Rhythm: Normal rate and regular rhythm. Pulses: Normal pulses. Heart sounds: No murmur. Pulmonary:      Effort: Pulmonary effort is normal. No respiratory distress. Breath sounds: Normal breath sounds. No wheezing. Chest:      Chest wall: No mass or tenderness. Abdominal:      General: There is no distension. Palpations: Abdomen is soft. Tenderness: There is no abdominal tenderness. There is no guarding or rebound. Musculoskeletal: Normal range of motion. General: No swelling, tenderness, deformity or signs of injury. Right lower leg: He exhibits no tenderness. No edema. Left lower leg: He exhibits no tenderness. No edema. Comments: Postsurgical left arm amputation   Skin:     General: Skin is warm and dry. Capillary Refill: Capillary refill takes less than 2 seconds. Neurological:      General: No focal deficit present. Mental Status: He is alert and oriented to person, place, and time. Psychiatric:         Mood and Affect: Mood normal.         Behavior: Behavior normal.          MDM  Number of Diagnoses or Management Options  Diagnosis management comments: 73-year-old gentleman presents as above with evidence of severe sepsis with SIRS criteria with tachypnea and leukocytosis with a UTI as a source of infection. He meets severe sepsis criteria with a low platelet value. Plan admit to the hospital for further management. Amount and/or Complexity of Data Reviewed  Clinical lab tests: reviewed  Tests in the radiology section of CPT®: reviewed  Tests in the medicine section of CPT®: reviewed    Risk of Complications, Morbidity, and/or Mortality  General comments: 8:59 AM  I have spent 35 minutes of critical care time involved in lab review, consultations with specialist, family decision-making, and documentation. During this entire length of time I was immediately available to the patient. Critical Care: The reason for providing this level of medical care for this critically ill patient was due a critical illness that impaired one or more vital organ systems such that there was a high probability of imminent or life threatening deterioration in the patients condition.  This care involved high complexity decision making to assess, manipulate, and support vital system functions, to treat this degreee vital organ system failure and to prevent further life threatening deterioration of the patients condition. Procedures        ED EKG interpretation:  Rhythm: normal sinus rhythm. Rate (approx.): 97. Axis: normal.  ST segment: Nonspecific T wave abnormality. This EKG was interpreted by Marko Mcnally MD,ED Provider. IMPRESSION:  No diagnosis found.    - Broad Spectrum Antibiotics ordered: Ceftriaxone  - Repeat lactic acid ordered for time NA  - Re-assessment performed at time 9:00 AM  and clinical condition improving.    - Hypotension or Lactic Acidosis present (SBP<90, MAP<65, Lactate >4): NO IVF:  Not indicated due to Hypotension not present  - Persistent Hypotension despite IVF resuscitation: NO  Vasopressors: Not indicated due to Septic Shock not present    Plan:  Admit to Step down    Total critical care time spent exclusive of procedures:  35 minutes    Lattie Mcburney, MD        Repeat Sepsis focused physical exam at time 9:00 AM   Vital signs   Visit Vitals  /66   Pulse 99   Temp 98.3 °F (36.8 °C)   Resp 30   Ht 6' 2\" (1.88 m)   Wt 99.8 kg (220 lb)   SpO2 93%   BMI 28.25 kg/m²        Cardiopulmonary exam  Regular rate and rhythm, normal cardiac and pulmonary auscultation, normal pulmonary exam    Capillary refill  Less than 2 seconds    Peripheral pulse evaluation  Strong and equal x4    Skin exam  Warm and dry    Lattie Mcburney, MD      Perfect Serve Consult for Admission  9:01 AM    ED Room Number: ER17/17  Patient Name and age:  Kristy Linares 78 y.o.  male  Working Diagnosis:   1. Severe sepsis ([address]    2. Thrombocytopenia ([address]    3. Complicated UTI (urinary tract infection)        COVID-19 Suspicion:  no  Sepsis present:  yes  Reassessment needed: no  Code Status:  Do Not Resuscitate  Readmission: no  Isolation Requirements:  no  Recommended Level of Care:  telemetry  Department:[facility] ED - [phone]  Other: Stepdown versus telemetry for severe sepsis with thrombocytopenia. Urinary source.

## 2021-04-26 NOTE — PROGRESS NOTES
4/26/2021  3:48 PM  Reason for Admission: Emergency - Sepsis due to UTI. ICD-10-CM ICD-9-CM    1. Severe sepsis (HCC)  A41.9 038.9     R65.20 995.92    2. Thrombocytopenia (HCC)  D69.6 287.5    3. Complicated UTI (urinary tract infection)  N39.0 599.0        Assessment:   []In person with pt   []Via p/c with pt   []With family member in person. Who/Relation:     [x]With family member via p/c. Who/Relation:  Nathaly Ferris / DTR - [phone]  []Chart Review    RUR:  10%  Risk Level: [x]Low []Moderate []High  Value-based purchasing: [x] Yes [] No  Bundle patient: [] Yes [x] No   Specify:     Advance Directive: DNR. [] No AD on file. [x] AD on file. [] Current AD not on file. Copy requested. [] Requests AD, and referral submitted to [facility]. Healthcare Decision Maker:  Darryl Olson / wife - [phone] / [phone]        Assessment:    Age:  78    Sex: [x] Male []Female     Residency: []Private residence []Apartment []Assisted Living []LTC [x]Other: [facility]  Exterior Steps: 0  Interior Steps: 0    Lives With: [x]With spouse []Other family members []Underage children []Alone []Care provider []Other:    Prior functioning:  []Independent with ADLs and iADLS []Dependent with ADLs and iADLs [x]Partial dependence, Specify:  Pt's wife helps with some grooming ADLs. Prior DME required:  []None []RW []Cane []Crutches []Bedside commode []CPAP []Home O2 (Liter/Provider: ) []Nebulizer   []Shower Chair [x]Wheelchair []Hospital Bed []Aashish []Stair lift []Rollator []Other:    Rehab history: [x]None []Outpatient PT []Home Health (Provider/Date: ) []SNF (Provider/Date: ) []IPR (Provider/Date: ) []LTC (Provider/Date: ) []Hospice (Provider/Date: )  []Other:     Discharge Concerns: [x]Yes []No []Unknown   Describe: Pt has post polio syndrome, and primary uses wheelchair for ambulation. Pt uses adult diapers, and DTR fears this may have contributed to pt's UTI's.  Pt's wife helps pt with some ADLs; however, this is a strain on her. Pt's wife helps with some grooming ADLs; however, this is strenuous on her. Pt's DTR wishes to have aides come into the apartment to assist.    Comments: Insurer:   Insurance Information                [facility] [address] MEDICARE PART A & B Phone: [phone]    Subscriber: [employer] Subscriber#: [id number]    Group#:  Precert#:         [address] HMO/CHOICE PLUS/POS Phone: [phone]    Subscriber: [employer] Subscriber#: [id number]    Group#:  Precert#:           PCP: Fabiola Baxter   [facility] [address] [place] [address] [address] [facility]   Phone number: [phone]   Current patient: [x]Yes []No   Approximate date of last visit: 02/10/2021   Access to virtual PCP visits: [x]Yes []No    Pharmacy:  Located within [facility] [employer] [place] [address] Transport: Family       Transition of care plan:    [x]Unable to determine at this time. Awaiting clinical progress, and disposition recommendations. [] Home with outpatient follow-up    [] Home with Outpatient PT and outpatient follow-up   Pt aware of OP appt? []Yes, Provider:   []Not scheduled   Transport provider:     [] Home with family assistance as needed and outpatient follow-up   Family able to assist:    Schedule:  Transport provider:      [] Home with Home Health   - Provider:     []SNF/IPR   -[]Preferences given:   []Listing provided and preferences requested   -Status: []Pending []Accepted:    -Auth required: []Yes []No    -Auth initiated date:   -3 midnight stay required: []Yes []No  Date satisfied:     [] Home with Hospice   -Provider:     [] [facility] information provided. [] Other:    Rosa Willis MA    Care Management Interventions  PCP Verified by CM: Yes([place] )  Mode of Transport at Discharge:  Other (see comment)(Family)  Transition of Care Consult (CM Consult): Discharge Planning  MyChart Signup: No  Discharge Durable Medical Equipment: No  Physical Therapy Consult: No  Occupational Therapy Consult: No  Speech Therapy Consult: No  Current Support Network: Lives with Spouse, Other([address])  Confirm Follow Up Transport: Family  Discharge Location  Discharge Placement: Unable to determine at this time

## 2021-04-26 NOTE — ED TRIAGE NOTES
Pt arrives via EMS from [facility] with complaints of increased weakness with chills that started yesterday. PT also notes nausea, achy and has no energy. Pt had COVID vaccine a month ago. Pt has hx of stents in heart.

## 2021-04-26 NOTE — PROGRESS NOTES
1537 TRANSFER - IN REPORT:    Verbal report received from Tressa Marcelo 38 (name) [facility]  [facility] from [facility]th (unit) for change in patient condition(sepsis)      Report consisted of patients Situation, Background, Assessment and   Recommendations(SBAR). Information from the following report(s) SBAR and Kardex was reviewed with the receiving nurse. Opportunity for questions and clarification was provided. Assessment completed upon patients arrival to unit and care assumed. 1628 Patient arrived to unit. 1915 Bedside and Verbal shift change report given to [address] [place] [address] (oncoming nurse) by Maude Singh RN (offgoing nurse). Report included the following information SBAR and Kardex.

## 2021-04-26 NOTE — PROGRESS NOTES
[facility]: MED RECONCILIATION    Comments/Recommendations:   Medication history confirmed with patient and daughter over the phone. Patient is a good historian. Reported medication history is consistent with RxQuery and  data    Medications added:     Tylenol #3 PRN severe pain  APAP 650 mg PRN mild pain    Medications adjusted:    Ibuprofen 400 mg PRN moderate pain  Confirmed metoprolol succinate 12.5 mg ER once daily- Prescribed 25 mg tabs #90 for 90 day supply, pt endorses taking 1/2 tablet    Information obtained from: Patient, RxQuery, [facility]-    Allergies: Patient has no known allergies. Prior to Admission Medications:     Medication Documentation Review Audit       Reviewed by Estefania Dumont (Pharmacist) on 04/26/21 at 1455      Medication Sig Documenting Provider Last Dose Status Taking?   acetaminophen (TYLENOL) 325 mg tablet Take 650 mg by mouth two (2) times daily as needed for Pain (Mild pain). Provider, Historical 4/19/2021 Active Yes   acetaminophen-codeine (TYLENOL #3) 300-30 mg per tablet Take 1 Tab by mouth two (2) times daily as needed for Pain (Severe pain). Provider, Historical 4/19/2021 Active Yes   ascorbic acid, vitamin C, (VITAMIN C) 1,000 mg tablet Take 1,000 mg by mouth daily. Provider, Historical 4/25/2021 Active Yes   cholecalciferol (Vitamin D3) (1000 Units /25 mcg) tablet Take 1,000 Units by mouth daily. Provider, Historical 4/25/2021 Active Yes   cyanocobalamin (Vitamin B-12) 1,000 mcg tablet Take 1,000 mcg by mouth daily. Provider, Historical 4/25/2021 Active Yes   ibuprofen (MOTRIN) 200 mg tablet Take 400 mg by mouth every twelve (12) hours as needed for Pain (Moderate pain). Provider, Historical 4/19/2021 Active Yes   metoprolol succinate (TOPROL-XL) 25 mg XL tablet Take 12.5 mg by mouth daily. Provider, Historical 4/25/2021 Active Yes   polyvinyl alcohol (LIQUIFILM TEARS) 1.4 % ophthalmic solution Administer 1 Drop to both eyes as needed (Dry eyes).  Provider, Historical 4/19/2021 Active Yes   simvastatin (ZOCOR) 40 mg tablet TAKE 1 TABLET BY MOUTH EVERYDAY AT BEDTIME Donna Singh MD 4/25/2021 Active Yes   tamsulosin (FLOMAX) 0.4 mg capsule Take 2 Caps by mouth daily. Donna Singh MD 4/25/2021 Active Yes   therapeutic multivitamin [facility]) tablet Take 1 Tab by mouth daily. Provider, Historical 4/25/2021 Active Yes   triamcinolone acetonide (KENALOG) 0.1 % topical cream Apply  to affected area two (2) times daily as needed for Skin Irritation.  use thin layer Donna Singh MD 4/19/2021 Active Yes                  Thanks,  FITO Lentz   Contact: [phone]

## 2021-04-26 NOTE — H&P
Hospitalist Admission Note    NAME: Kaylee Parish   :  1940   MRN:  [id number]     Date/Time:  2021 10:34 AM    Patient PCP: Joann Isaac MD  ________________________________________________________________________    Given the patient's current clinical presentation, I have a high level of concern for decompensation if discharged from the emergency department. Complex decision making was performed, which includes reviewing the patient's available past medical records, laboratory results, and x-ray films. My assessment of this patient's clinical condition and my plan of care is as follows. Assessment / Plan:    #Sepsis due to UTI: Leukocytosis, tachycardia and gross pyuria. Hx K pneumonia, relatively sensitive but has received abx recently so will broadly antibiose. Bladder scan minimal.    - Pip/tazo  -    - CT KUB eval nephrolithiasis   - F/u UCx, BCx    - Continue to monitor fluid responsiveness   - UOP and MAPs to goal    #Bacteremia: Likely 2/2 above   - abx as above   - CT    #AHRF: Presented without oxygen requirement and clear cxr. Now needing 4L and with crackles to L mid lobe, R base. Suspect either aspiration as pt now vomiting copiously with difficulty leaning forward 2/2 post-polio weakness vs pulm edema from aggressive fluid resuscitation. Presently on 4L nc   - F/u CXR   - Echo   - Abx as above    #Thrombocytopenia: Chronically depressed, but slightly lower here today in s/o sepsis. 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 Quite weak at present and will likely need SNF placement    #BPH: No e/o retention on ladder scan   - Hold tamsulosin until sepsis resolved, then restart    #Insomnia: ambien prn at home; hold for now          I have personally reviewed the radiographs, laboratory data in Epic and decisions and statements above are based partially on this personal interpretation.     Code Status: DNR/DNI  DVT Prophylaxis: Lovenox  GI Prophylaxis: not indicated       Subjective:   CHIEF COMPLAINT: \"Malaise\"    HISTORY OF PRESENT ILLNESS:     Kassandra Esquivel is a 78 y.o.  M with a PMHx post-polio syndrome, BPH, who presents with several days of chills, fever, malaise. Sx started Friday with R sided back pain and weakness. He waited it out and was going to call dispatch health this morning, but he became much weaker and fever higher so called EMT. He denies dysuria or frequency. Denies this is similar to orchitis pain that he had 3 mo ago. No cp, cough, sob, rash. In ED he is noted to have tachycardia, leukocytosis, and pyuria. Past Medical History:   Diagnosis Date    BPH (benign prostatic hyperplasia)     saw urology in [place] CAD in native artery     s/p stent x2. saw Dr. Garrett Funes in [place]    DNR (do not resuscitate) 4/17/2018    HTN (hypertension)     Hx of diverticulitis of colon     Hypercholesterolemia     Melanoma ([address]     x4. one present on L shoulder stump. 2013. superficial per pt    Phantom pain ([address]     L arm    Post-polio syndrome age 4    dx 2004 in [place]. He still drives. weakness, R deltoid weakness, s/p L arm amputation, R hip pain, R leg-ankle brace    Renal cyst     8 cm, increasing 20 years    Right hip pain     chronic, polio    Thrombocytopenia (HCC)     chronic. clumping seen 1/2021. 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  HX CORONARY STENT PLACEMENT  2006    HX CORONARY STENT PLACEMENT  2004?     HX HERNIA REPAIR Left     HX TONSILLECTOMY      HX VEIN STRIPPING       Social History     Tobacco Use    Smoking status: Never Smoker    Smokeless tobacco: Never Used   Substance Use Topics    Alcohol use: No      Family History Problem Relation Age of Onset    Diabetes Mother         No Known Allergies     Prior to Admission medications    Medication Sig Start Date End Date Taking? Authorizing Provider   simvastatin (ZOCOR) 40 mg tablet TAKE 1 TABLET BY MOUTH EVERYDAY AT BEDTIME 4/5/21   Olvin Chavarria MD   tamsulosin (FLOMAX) 0.4 mg capsule Take 2 Caps by mouth daily. 2/28/21   Olvin Chavarria MD   metoprolol succinate (TOPROL-XL) 25 mg XL tablet Take 12.5 mg by mouth daily. Provider, Historical   cyanocobalamin (Vitamin B-12) 1,000 mcg tablet Take 1,000 mcg by mouth daily. Provider, Historical   cholecalciferol (Vitamin D3) (1000 Units /25 mcg) tablet Take 1,000 Units by mouth daily. Provider, Historical   ibuprofen (MOTRIN) 200 mg tablet Take 400 mg by mouth every twelve (12) hours as needed for Pain. Provider, Historical   triamcinolone acetonide (KENALOG) 0.1 % topical cream Apply  to affected area two (2) times daily as needed for Skin Irritation. use thin layer 12/7/20   Olvin Chavarria MD   ascorbic acid, vitamin C, (VITAMIN C) 1,000 mg tablet Take 1,000 mg by mouth daily. Provider, Historical   therapeutic multivitamin (THERAGRAN) tablet Take 1 Tab by mouth daily. Provider, Historical   hydroxypropyl methylcellulose (ISOPTO TEARS) 0.5 % ophthalmic solution Administer 1 Drop to both eyes nightly.     Provider, Historical     REVIEW OF SYSTEMS:  See HPI for details  General: +negative for fever, chills, sweats, weakness, weight loss  Eyes: negative for blurred vision, eye pain, loss of vision, diplopia  Ear Nose and Throat: negative for rhinorrhea, pharyngitis, otalgia, tinnitus, speech or swallowing difficulties  Respiratory:  negative for pleuritic pain, cough, sputum production, wheezing, SOB, LUA  Cardiology:  negative for chest pain, palpitations, orthopnea, PND, edema, syncope   Gastrointestinal: negative for abdominal pain, N/V, dysphagia, change in bowel habits, bleeding  Genitourinary: negative for frequency, urgency, dysuria, hematuria, incontinence  Muskuloskeletal : negative for arthralgia, myalgia  Hematology: negative for easy bruising, bleeding, lymphadenopathy  Dermatological: negative for rash, ulceration, mole change, new lesion  Endocrine: negative for hot flashes or polydipsia  Neurological: negative for headache, dizziness, confusion, focal weakness, paresthesia, memory loss, gait disturbance  Psychological: negative for anxiety, depression, agitation      Objective:   VITALS:    Visit Vitals  BP (!) 124/56   Pulse 92   Temp 98.3 °F (36.8 °C)   Resp 19   Ht 6' 2\" (1.88 m)   Wt 99.8 kg (220 lb)   SpO2 90%   BMI 28.25 kg/m²     PHYSICAL EXAM:    Physical Exam:    Gen: Mild distress, worse appearing than exam 3 hours ago  HEENT:  Pink conjunctivae, PERRL, hearing intact to voice, moist mucous membranes  Neck: Supple, without masses, thyroid non-tender  Resp: No accessory muscle use,Rales LML, RLL  Card: No murmurs, normal S1, S2 without thrills, no edema; tachycardic  Abd:  Soft, non-tender, non-distended, normoactive bowel sounds are present, no palpable organomegaly and no detectable hernias  Lymph:  No cervical or inguinal adenopathy  Musc: No cyanosis or clubbing  Skin: No rashes or ulcers, skin turgor is good  Neuro:  Cranial nerves are grossly intact, no focal motor weakness, follows commands appropriately  Psych:  Good insight, oriented to person, place and time, alert          _______________________________________________________________________  Care Plan discussed with:    Comments   Patient x Discussed with patient in room.  POC outlined and Questions answered    Family  x    RN x    Care Manager                    Consultant:  israel CASAS MD   _______________________________________________________________________  Recommended Disposition:   Home with Family x   HH/PT/OT/RN    SNF/LTC    SERENA    ________________________________________________________________________  TOTAL TIME:  60 Minutes Comments   >50% of visit spent in counseling and coordination of care  Chart reviewed  Discussion with patient and/or family and questions answered     ________________________________________________________________________  Signed: Yan Keller MD    This note will not be viewable in [address] Procedures: see electronic medical records for all procedures/Xrays and details which were not copied into this note but were reviewed prior to creation of Plan. LAB DATA REVIEWED:    Recent Results (from the past 24 hour(s))   EKG, 12 LEAD, INITIAL    Collection Time: 04/26/21  7:35 AM   Result Value Ref Range    Ventricular Rate 97 BPM    Atrial Rate 97 BPM    P-R Interval 186 ms    QRS Duration 122 ms    Q-T Interval 360 ms    QTC Calculation (Bezet) 457 ms    Calculated P Axis 39 degrees    Calculated R Axis 6 degrees    Calculated T Axis -21 degrees    Diagnosis       Normal sinus rhythm  Possible Left atrial enlargement  Septal infarct , age undetermined  T wave abnormality, consider inferior ischemia  Abnormal ECG  When compared with ECG of 25-OCT-2019 17:06,  QRS duration has increased  Septal infarct is now present  Inverted T waves have replaced nonspecific T wave abnormality in Inferior   leads  Nonspecific T wave abnormality now evident in Anterior leads     METABOLIC PANEL, COMPREHENSIVE    Collection Time: 04/26/21  7:51 AM   Result Value Ref Range    Sodium 139 136 - 145 mmol/L    Potassium 3.5 3.5 - 5.1 mmol/L    Chloride 111 (H) 97 - 108 mmol/L    CO2 20 (L) 21 - 32 mmol/L    Anion gap 8 5 - 15 mmol/L    Glucose 142 (H) 65 - 100 mg/dL    BUN 19 6 - 20 MG/DL    Creatinine 0.95 0.70 - 1.30 MG/DL    BUN/Creatinine ratio 20 12 - 20      GFR est AA >60 >60 ml/min/1.73m2    GFR est non-AA >60 >60 ml/min/1.73m2    Calcium 8.0 (L) 8.5 - 10.1 MG/DL    Bilirubin, total 0.7 0.2 - 1.0 MG/DL    ALT (SGPT) 18 12 - 78 U/L    AST (SGOT) 16 15 - 37 U/L    Alk.  phosphatase 68 45 - 117 U/L    Protein, total 6.4 6.4 - 8.2 g/dL    Albumin 3.3 (L) 3.5 - 5.0 g/dL    Globulin 3.1 2.0 - 4.0 g/dL    A-G Ratio 1.1 1.1 - 2.2     CBC WITH AUTOMATED DIFF    Collection Time: 04/26/21  7:51 AM   Result Value Ref Range    WBC 18.5 (H) 4.1 - 11.1 K/uL    RBC 3.99 (L) 4.10 - 5.70 M/uL    HGB 11.4 (L) 12.1 - 17.0 g/dL    HCT 36.6 36.6 - 50.3 %    MCV 91.7 80.0 - 99.0 FL    MCH 28.6 26.0 - 34.0 PG    MCHC 31.1 30.0 - 36.5 g/dL    RDW 16.1 (H) 11.5 - 14.5 %    PLATELET 89 (L) 957 - 400 K/uL    MPV 11.7 8.9 - 12.9 FL    NRBC 0.0 0  WBC    ABSOLUTE NRBC 0.00 0.00 - 0.01 K/uL    NEUTROPHILS 90 (H) 32 - 75 %    LYMPHOCYTES 2 (L) 12 - 49 %    MONOCYTES 6 5 - 13 %    EOSINOPHILS 0 0 - 7 %    BASOPHILS 0 0 - 1 %    IMMATURE GRANULOCYTES 2 (H) 0.0 - 0.5 %    ABS. NEUTROPHILS 16.6 (H) 1.8 - 8.0 K/UL    ABS. LYMPHOCYTES 0.4 (L) 0.8 - 3.5 K/UL    ABS. MONOCYTES 1.1 (H) 0.0 - 1.0 K/UL    ABS. EOSINOPHILS 0.0 0.0 - 0.4 K/UL    ABS. BASOPHILS 0.0 0.0 - 0.1 K/UL    ABS. IMM. GRANS. 0.4 (H) 0.00 - 0.04 K/UL    DF SMEAR SCANNED      RBC COMMENTS NORMOCYTIC, NORMOCHROMIC     TROPONIN I    Collection Time: 04/26/21  7:51 AM   Result Value Ref Range    Troponin-I, Qt. <0.05 <0.05 ng/mL   CULTURE, BLOOD, PAIRED    Collection Time: 04/26/21  7:51 AM    Specimen: Blood   Result Value Ref Range    Special Requests: NO SPECIAL REQUESTS      Culture result: (A)       GRAM NEGATIVE RODS GROWING IN ALL 4 BOTTLES DRAWN (SITES = R AND L HAND)   LACTIC ACID    Collection Time: 04/26/21  7:51 AM   Result Value Ref Range    Lactic acid 1.3 0.4 - 2.0 MMOL/L   SAMPLES BEING HELD    Collection Time: 04/26/21  7:51 AM   Result Value Ref Range    SAMPLES BEING HELD 1RED,1SST,1BL     COMMENT        Add-on orders for these samples will be processed based on acceptable specimen integrity and analyte stability, which may vary by analyte.    URINALYSIS W/ RFLX MICROSCOPIC    Collection Time: 04/26/21  8:06 AM   Result Value Ref Range    Color YELLOW/STRAW      Appearance TURBID (A) CLEAR      Specific gravity 1.018 1.003 - 1.030      pH (UA) 5.5 5.0 - 8.0      Protein 30 (A) NEG mg/dL    Glucose Negative NEG mg/dL    Ketone 15 (A) NEG mg/dL    Bilirubin Negative NEG      Blood MODERATE (A) NEG      Urobilinogen 0.2 0.2 - 1.0 EU/dL    Nitrites Positive (A) NEG      Leukocyte Esterase LARGE (A) NEG      WBC >100 (H) 0 - 4 /hpf    RBC 5-10 0 - 5 /hpf    Epithelial cells FEW FEW /lpf    Bacteria 4+ (A) NEG /hpf    Hyaline cast 2-5 0 - 5 /lpf

## 2021-04-27 ENCOUNTER — APPOINTMENT (OUTPATIENT)
Dept: NON INVASIVE DIAGNOSTICS | Age: 81
DRG: 871 | End: 2021-04-27
Attending: INTERNAL MEDICINE
Payer: MEDICARE

## 2021-04-27 LAB
ANION GAP SERPL CALC-SCNC: 5 MMOL/L (ref 5–15)
BASOPHILS # BLD: 0 K/UL (ref 0–0.1)
BASOPHILS NFR BLD: 0 % (ref 0–1)
BUN SERPL-MCNC: 22 MG/DL (ref 6–20)
BUN/CREAT SERPL: 22 (ref 12–20)
CALCIUM SERPL-MCNC: 7.8 MG/DL (ref 8.5–10.1)
CHLORIDE SERPL-SCNC: 112 MMOL/L (ref 97–108)
CO2 SERPL-SCNC: 24 MMOL/L (ref 21–32)
CREAT SERPL-MCNC: 0.99 MG/DL (ref 0.7–1.3)
DIFFERENTIAL METHOD BLD: ABNORMAL
ECHO AO ASC DIAM: 4.14 CM
ECHO AO ROOT DIAM: 3.84 CM
ECHO EST RA PRESSURE: 3 MMHG
ECHO IVC PROX: 2.3 CM
ECHO LA AREA 4C: 14.28 CM2
ECHO LA MAJOR AXIS: 2.73 CM
ECHO LA MINOR AXIS: 1.18 CM
ECHO LA VOL 2C: 42.24 ML (ref 18–58)
ECHO LA VOL 4C: 35.31 ML (ref 18–58)
ECHO LA VOL BP: 43.1 ML (ref 18–58)
ECHO LA VOL/BSA BIPLANE: 18.57 ML/M2 (ref 16–28)
ECHO LA VOLUME INDEX A2C: 18.2 ML/M2 (ref 16–28)
ECHO LA VOLUME INDEX A4C: 15.21 ML/M2 (ref 16–28)
ECHO LV E' LATERAL VELOCITY: 7.63 CENTIMETER/SECOND
ECHO LV E' SEPTAL VELOCITY: 6.5 CENTIMETER/SECOND
ECHO LV INTERNAL DIMENSION DIASTOLIC: 4.41 CM (ref 4.2–5.9)
ECHO LV INTERNAL DIMENSION SYSTOLIC: 2.83 CM
ECHO LV IVSD: 0.91 CM (ref 0.6–1)
ECHO LV MASS 2D: 120.9 G (ref 88–224)
ECHO LV MASS INDEX 2D: 52.1 G/M2 (ref 49–115)
ECHO LV POSTERIOR WALL DIASTOLIC: 0.81 CM (ref 0.6–1)
ECHO LVOT DIAM: 2.07 CM
ECHO LVOT PEAK GRADIENT: 3.2 MMHG
ECHO LVOT PEAK VELOCITY: 89.51 CM/S
ECHO MV A VELOCITY: 66.55 CENTIMETER/SECOND
ECHO MV AREA PHT: 6.37 CM2
ECHO MV E DECELERATION TIME (DT): 119.07 MS
ECHO MV E VELOCITY: 64.88 CENTIMETER/SECOND
ECHO MV PRESSURE HALF TIME (PHT): 34.53 MS
ECHO RIGHT VENTRICULAR SYSTOLIC PRESSURE (RVSP): 32.4 MMHG
ECHO RV INTERNAL DIMENSION: 3.68 CM
ECHO RV TAPSE: 1.59 CM (ref 1.5–2)
ECHO TV REGURGITANT MAX VELOCITY: 271.1 CM/S
ECHO TV REGURGITANT PEAK GRADIENT: 29.4 MMHG
EOSINOPHIL # BLD: 0 K/UL (ref 0–0.4)
EOSINOPHIL NFR BLD: 0 % (ref 0–7)
ERYTHROCYTE [DISTWIDTH] IN BLOOD BY AUTOMATED COUNT: 16.1 % (ref 11.5–14.5)
GLUCOSE SERPL-MCNC: 109 MG/DL (ref 65–100)
HCT VFR BLD AUTO: 34.9 % (ref 36.6–50.3)
HGB BLD-MCNC: 10.3 G/DL (ref 12.1–17)
IMM GRANULOCYTES # BLD AUTO: 0 K/UL
IMM GRANULOCYTES NFR BLD AUTO: 0 %
LA VOL DISK BP: 39.6 ML (ref 18–58)
LYMPHOCYTES # BLD: 0.6 K/UL (ref 0.8–3.5)
LYMPHOCYTES NFR BLD: 4 % (ref 12–49)
MCH RBC QN AUTO: 27.9 PG (ref 26–34)
MCHC RBC AUTO-ENTMCNC: 29.5 G/DL (ref 30–36.5)
MCV RBC AUTO: 94.6 FL (ref 80–99)
MONOCYTES # BLD: 0.6 K/UL (ref 0–1)
MONOCYTES NFR BLD: 4 % (ref 5–13)
MYELOCYTES NFR BLD MANUAL: 2 %
NEUTS BAND NFR BLD MANUAL: 2 % (ref 0–6)
NEUTS SEG # BLD: 13.1 K/UL (ref 1.8–8)
NEUTS SEG NFR BLD: 88 % (ref 32–75)
NRBC # BLD: 0 K/UL (ref 0–0.01)
NRBC BLD-RTO: 0 PER 100 WBC
PLATELET # BLD AUTO: 76 K/UL (ref 150–400)
PMV BLD AUTO: 11.1 FL (ref 8.9–12.9)
POTASSIUM SERPL-SCNC: 3.7 MMOL/L (ref 3.5–5.1)
RBC # BLD AUTO: 3.69 M/UL (ref 4.1–5.7)
RBC MORPH BLD: ABNORMAL
SODIUM SERPL-SCNC: 141 MMOL/L (ref 136–145)
WBC # BLD AUTO: 14.6 K/UL (ref 4.1–11.1)

## 2021-04-27 PROCEDURE — 77010033678 HC OXYGEN DAILY

## 2021-04-27 PROCEDURE — 74011250637 HC RX REV CODE- 250/637: Performed by: INTERNAL MEDICINE

## 2021-04-27 PROCEDURE — 93306 TTE W/DOPPLER COMPLETE: CPT | Performed by: INTERNAL MEDICINE

## 2021-04-27 PROCEDURE — 65660000000 HC RM CCU STEPDOWN

## 2021-04-27 PROCEDURE — 85025 COMPLETE CBC W/AUTO DIFF WBC: CPT

## 2021-04-27 PROCEDURE — 74011250636 HC RX REV CODE- 250/636: Performed by: INTERNAL MEDICINE

## 2021-04-27 PROCEDURE — 80048 BASIC METABOLIC PNL TOTAL CA: CPT

## 2021-04-27 PROCEDURE — 74011000258 HC RX REV CODE- 258: Performed by: INTERNAL MEDICINE

## 2021-04-27 PROCEDURE — 93306 TTE W/DOPPLER COMPLETE: CPT

## 2021-04-27 PROCEDURE — 36415 COLL VENOUS BLD VENIPUNCTURE: CPT

## 2021-04-27 RX ORDER — TAMSULOSIN HYDROCHLORIDE 0.4 MG/1
0.8 CAPSULE ORAL DAILY
Status: DISCONTINUED | OUTPATIENT
Start: 2021-04-27 | End: 2021-04-29 | Stop reason: HOSPADM

## 2021-04-27 RX ORDER — ATORVASTATIN CALCIUM 20 MG/1
20 TABLET, FILM COATED ORAL
Status: DISCONTINUED | OUTPATIENT
Start: 2021-04-27 | End: 2021-04-29 | Stop reason: HOSPADM

## 2021-04-27 RX ADMIN — Medication 10 ML: at 21:24

## 2021-04-27 RX ADMIN — ENOXAPARIN SODIUM 40 MG: 40 INJECTION SUBCUTANEOUS at 08:31

## 2021-04-27 RX ADMIN — Medication 10 ML: at 16:58

## 2021-04-27 RX ADMIN — ACETAMINOPHEN 650 MG: 325 TABLET ORAL at 16:00

## 2021-04-27 RX ADMIN — PIPERACILLIN AND TAZOBACTAM 3.38 G: 3; .375 INJECTION, POWDER, LYOPHILIZED, FOR SOLUTION INTRAVENOUS at 00:02

## 2021-04-27 RX ADMIN — PIPERACILLIN AND TAZOBACTAM 3.38 G: 3; .375 INJECTION, POWDER, LYOPHILIZED, FOR SOLUTION INTRAVENOUS at 16:58

## 2021-04-27 RX ADMIN — TAMSULOSIN HYDROCHLORIDE 0.8 MG: 0.4 CAPSULE ORAL at 21:24

## 2021-04-27 RX ADMIN — Medication 10 ML: at 06:35

## 2021-04-27 RX ADMIN — ATORVASTATIN CALCIUM 20 MG: 20 TABLET, FILM COATED ORAL at 21:24

## 2021-04-27 RX ADMIN — PIPERACILLIN AND TAZOBACTAM 3.38 G: 3; .375 INJECTION, POWDER, LYOPHILIZED, FOR SOLUTION INTRAVENOUS at 08:31

## 2021-04-27 NOTE — PROGRESS NOTES
Bedside shift change report given to [facility] (oncoming nurse) by Geraldo Silva (offgoing nurse). Report included the following information SBAR, Kardex, ED Summary, Procedure Summary, Intake/Output, MAR, Recent Results and Cardiac Rhythm NSR. Bedside shift change report given to Eli Rosario (oncoming nurse) by [facility] (offgoing nurse). Report included the following information SBAR, Kardex, ED Summary, Intake/Output, MAR, Recent Results and Cardiac Rhythm NSR.

## 2021-04-27 NOTE — PROGRESS NOTES
1900- Bedside, Verbal, and Written shift change report given to Izzy Eden RN (oncoming nurse) by SOLEDAD Santoro (offgoing nurse). Report included the following information SBAR, Kardex, ED Summary, Intake/Output, MAR, Accordion, Recent Results, and Med Rec Status. This patient was assisted with Intentional Toileting every 2 hours during this shift. Documentation of ambulation and output reflected on Flowsheet. 0700- Bedside, Verbal, and Written shift change report given to Viral Navarro RN  (oncoming nurse) by Diann Bowman  (offgoing nurse). Report included the following information SBAR, Kardex, ED Summary, Intake/Output, MAR, Accordion, Recent Results, and Med Rec Status.

## 2021-04-27 NOTE — PROGRESS NOTES
Care Management follow up    Patient admitted for sepsis, UTI, thrombocytopenia, chills/fever, malaise. Hx post polio syndrome - wheelchair bound, BPH, CAD. RUR score 12%/ low risk    Current status  Patient requiring medical management including IV antibiotics and oxygen at 4L/nc. Patient lives at [address] [place] [address] with his spouse. Transition of Care Plan  1. Monitor patient status and response to treatment. 2. Medical management continues. 3. Unsure of DC needs at this time, likely home with family assist.  4. CM to monitor progress and recommendations.     Velma Frarell, RN, MSN/Care manager

## 2021-04-27 NOTE — PROGRESS NOTES
[facility] [address]  [address] [facility], [id number] [place] [address]  [phone]      Medical Progress Note      NAME:         Debra Wilkins   :        1940  MRM:        [id number]    Date of service: 2021      Subjective: Patient has been seen and examined as a follow up for multiple medical issues. Chart, labs, diagnostics reviewed. He feels better since admission. No fever or chills. Mild nausea. Concerned about recurrence of UTis. Previously seen by urology. He denies any cough or SOB    Objective:    Vital Signs:    Visit Vitals  /60 (BP 1 Location: Left upper arm, BP Patient Position: At rest)   Pulse 85   Temp 99.1 °F (37.3 °C)   Resp 25   Ht 6' 2\" (1.88 m)   Wt 105.9 kg (233 lb 7.5 oz)   SpO2 96%   BMI 29.98 kg/m²          Intake/Output Summary (Last 24 hours) at 2021 1425  Last data filed at 2021 0657  Gross per 24 hour   Intake 540 ml   Output 325 ml   Net 215 ml        Physical Examination:    General:   Weak and ill looking but not in any acute distress   Eyes:   pink conjunctivae, PERRLA with no discharge. ENT:   no ottorrhea or rhinorrhea with dry mucous membranes  Neck: no masses, thyroid non-tender and trachea central.  Pulm:  no accessory muscle use, decreased breath sounds without crackles or wheezes  Card:  no JVD or murmurs, has regular and normal S1, S2 without thrills, bruits or peripheral edema  Abd:  Soft, non-tender, non-distended, normoactive bowel sounds with no palpable organomegaly  Musc:  No cyanosis, clubbing, atrophy or deformities. Skin:  No rashes, bruising or ulcers. Neuro: Awake and alert. Generally weak.  Follows commands appropriately  Psych:  Has a good insight and is oriented x 3    Current Facility-Administered Medications   Medication Dose Route Frequency    sodium chloride (NS) flush 5-40 mL  5-40 mL IntraVENous Q8H    sodium chloride (NS) flush 5-40 mL  5-40 mL IntraVENous PRN    acetaminophen (TYLENOL) tablet 650 mg  650 mg Oral Q6H PRN    Or    acetaminophen (TYLENOL) suppository 650 mg  650 mg Rectal Q6H PRN    polyethylene glycol (MIRALAX) packet 17 g  17 g Oral DAILY PRN    promethazine (PHENERGAN) tablet 12.5 mg  12.5 mg Oral Q6H PRN    Or    ondansetron (ZOFRAN) injection 4 mg  4 mg IntraVENous Q6H PRN    enoxaparin (LOVENOX) injection 40 mg  40 mg SubCUTAneous DAILY    piperacillin-tazobactam (ZOSYN) 3.375 g in 0.9% sodium chloride (MBP/ADV) 100 mL MBP  3.375 g IntraVENous Q8H        Laboratory data and review:    Recent Labs     04/27/21  0352 04/26/21  0751   WBC 14.6* 18.5*   HGB 10.3* 11.4*   HCT 34.9* 36.6   PLT 76* 89*     Recent Labs     04/27/21  0352 04/26/21  0751    139   K 3.7 3.5   * 111*   CO2 24 20*   * 142*   BUN 22* 19   CREA 0.99 0.95   CA 7.8* 8.0*   ALB  --  3.3*   ALT  --  18     No components found for: [place]    Diagnostics: Imaging studies have been reviewed    Telemetry reviewed by me:   normal sinus rhythm    Assessment and Plan:    Sepsis due to urinary tract infection + ? Pneumonia involving left lung (4/26/2021) POA: he has had recurrent UTIs. Has urine incontinence and polycystic kidney disease. Blood and urine cultures isolating Gram neg rods. CT abdomen and pelvis showed a left basal consolidation. Continue IV Zosyn and follow pending cultures    CAD (coronary artery disease) POA: Overview: s/p stent x2. saw Dr. Antwan Humphrey in [place]. No new symptoms. BB on hold given low normal BPs. Resume Simvastatin    Post-polio syndrome POA: Overview: weakness, R deltoid weakness, s/p L arm amputation, R hip pain, R leg-ankle brace POA: continue supportive care    BPH (benign prostatic hyperplasia) POA: resume Flomax     HTN (hypertension) (10/25/2019) POA: BP currently low normal. OK to hold BB    Thrombocytopenia ([address] (10/26/2019) POA: likely chronic vs triggered with sepsis episodes.  Previously seen by hematology and no further testing recommended at that time.  Monitor while here while on enoxaparin    Hypercholesterolemia POA: resume Simvastatin     Total time spent for the patient's care: [address] [facility] discussed with: Patient, Family, Care Manager and Nursing Staff    Discussed:  Care Plan and D/C Planning    Prophylaxis:  Lovenox    Anticipated Disposition:   PT, OT, RN           ___________________________________________________    Attending Physician:   Jayla Ma MD

## 2021-04-27 NOTE — WOUND CARE
Wound Consult:  New Patient Visit. Chart reviewed. Consulted for sacral redness/blanching POA. Spoke with patients nursePaulino at bedside while patient having skin/incontinence care provided. Patient is resting on a Versacare bed with accumax mattress. Heels off loaded with pillows. Patient is alert and oriented x 4; requires one to two person assist to move side to side in bed. Alan score 15. Assessment:  Buttocks/sacrum - minimally to faintly pink; blanching. Redness noted on admission to unit appears to have resolved with bed/turning. No redness or injury to hips/heels/feet. Scrotum red/pink, intact. Treatment:  Zinc barrier ointment in use and continued. Skin Care / PI Prevention Recommendations:  1. Minimize friction/shear: minimize layers of linen/pads under patient. 2. Off load pressure/reposition:  turn and reposition approximately every 2 hours; float heels with pillows as needed. 3. Manage Moisture - keep skin folds dry; incontinence skin care with incontinence wipes; zinc barrier ointment; using urinal with some spillage; quick wick pad placed by [place]. 4. Continue to monitor nutrition, pain, and skin risk scale, and skin assessment. Plan:  Please re-consult should concerns arise despite continued skin/PI prevention measures.   Vinnie Resendiz RN, [address] [facility], [facility] / [address] [facility] Healing Office [phone]

## 2021-04-27 NOTE — CONSULTS
Nutrition Note    4/27: RD consult for wounds. No wounds identified per wound care note. No nutrition assessment necessary at this time. Please consult again if other nutrition needs arise.     Electronically signed by Ainsley Handley RDN on 4/27/2021 at 1:56 PM    Contact: [phone]

## 2021-04-28 LAB
BACTERIA SPEC CULT: ABNORMAL
BACTERIA SPEC CULT: ABNORMAL
BASOPHILS # BLD: 0 K/UL (ref 0–0.1)
BASOPHILS NFR BLD: 0 % (ref 0–1)
CC UR VC: ABNORMAL
DIFFERENTIAL METHOD BLD: ABNORMAL
EOSINOPHIL # BLD: 0 K/UL (ref 0–0.4)
EOSINOPHIL NFR BLD: 0 % (ref 0–7)
ERYTHROCYTE [DISTWIDTH] IN BLOOD BY AUTOMATED COUNT: 15.9 % (ref 11.5–14.5)
HCT VFR BLD AUTO: 36.2 % (ref 36.6–50.3)
HGB BLD-MCNC: 10.6 G/DL (ref 12.1–17)
IMM GRANULOCYTES # BLD AUTO: 0.1 K/UL (ref 0–0.04)
IMM GRANULOCYTES NFR BLD AUTO: 1 % (ref 0–0.5)
LYMPHOCYTES # BLD: 0.7 K/UL (ref 0.8–3.5)
LYMPHOCYTES NFR BLD: 8 % (ref 12–49)
MCH RBC QN AUTO: 28 PG (ref 26–34)
MCHC RBC AUTO-ENTMCNC: 29.3 G/DL (ref 30–36.5)
MCV RBC AUTO: 95.5 FL (ref 80–99)
MONOCYTES # BLD: 0.6 K/UL (ref 0–1)
MONOCYTES NFR BLD: 7 % (ref 5–13)
NEUTS SEG # BLD: 7.6 K/UL (ref 1.8–8)
NEUTS SEG NFR BLD: 84 % (ref 32–75)
NRBC # BLD: 0 K/UL (ref 0–0.01)
NRBC BLD-RTO: 0 PER 100 WBC
PLATELET # BLD AUTO: 71 K/UL (ref 150–400)
PMV BLD AUTO: 11 FL (ref 8.9–12.9)
RBC # BLD AUTO: 3.79 M/UL (ref 4.1–5.7)
RBC MORPH BLD: ABNORMAL
RBC MORPH BLD: ABNORMAL
SERVICE CMNT-IMP: ABNORMAL
SERVICE CMNT-IMP: ABNORMAL
WBC # BLD AUTO: 9 K/UL (ref 4.1–11.1)

## 2021-04-28 PROCEDURE — 74011250636 HC RX REV CODE- 250/636: Performed by: INTERNAL MEDICINE

## 2021-04-28 PROCEDURE — 74011000250 HC RX REV CODE- 250: Performed by: INTERNAL MEDICINE

## 2021-04-28 PROCEDURE — 74011000258 HC RX REV CODE- 258: Performed by: INTERNAL MEDICINE

## 2021-04-28 PROCEDURE — 85025 COMPLETE CBC W/AUTO DIFF WBC: CPT

## 2021-04-28 PROCEDURE — 36415 COLL VENOUS BLD VENIPUNCTURE: CPT

## 2021-04-28 PROCEDURE — 65660000000 HC RM CCU STEPDOWN

## 2021-04-28 PROCEDURE — 74011250637 HC RX REV CODE- 250/637: Performed by: INTERNAL MEDICINE

## 2021-04-28 PROCEDURE — 87040 BLOOD CULTURE FOR BACTERIA: CPT

## 2021-04-28 RX ADMIN — PIPERACILLIN AND TAZOBACTAM 3.38 G: 3; .375 INJECTION, POWDER, LYOPHILIZED, FOR SOLUTION INTRAVENOUS at 00:00

## 2021-04-28 RX ADMIN — DOXYCYCLINE 100 MG: 100 INJECTION, POWDER, LYOPHILIZED, FOR SOLUTION INTRAVENOUS at 21:17

## 2021-04-28 RX ADMIN — PIPERACILLIN AND TAZOBACTAM 3.38 G: 3; .375 INJECTION, POWDER, LYOPHILIZED, FOR SOLUTION INTRAVENOUS at 08:34

## 2021-04-28 RX ADMIN — DOXYCYCLINE 100 MG: 100 INJECTION, POWDER, LYOPHILIZED, FOR SOLUTION INTRAVENOUS at 17:12

## 2021-04-28 RX ADMIN — Medication 10 ML: at 16:32

## 2021-04-28 RX ADMIN — CEFTRIAXONE 2 G: 2 INJECTION, POWDER, FOR SOLUTION INTRAMUSCULAR; INTRAVENOUS at 16:31

## 2021-04-28 RX ADMIN — ENOXAPARIN SODIUM 40 MG: 40 INJECTION SUBCUTANEOUS at 08:21

## 2021-04-28 RX ADMIN — TAMSULOSIN HYDROCHLORIDE 0.8 MG: 0.4 CAPSULE ORAL at 21:17

## 2021-04-28 RX ADMIN — Medication 10 ML: at 05:33

## 2021-04-28 RX ADMIN — ATORVASTATIN CALCIUM 20 MG: 20 TABLET, FILM COATED ORAL at 21:17

## 2021-04-28 NOTE — PROGRESS NOTES
Call placed to MD. Garrett Hendricks regarding updatd lab report received from [facility]): NO GROWTH IN BLOOD CULTURES that were originally received from 4/26/2021. MD made aware and no new orders received.

## 2021-04-28 NOTE — PROGRESS NOTES
Hunter Malik [facility] [address]  1021 [facility], [address] [place] [address]  [phone]      Medical Progress Note      NAME:         Joyce Figueroa   :        1940  MRM:        [id number]    Date of service: 2021      Subjective: Patient has been seen and examined as a follow up for multiple medical issues. Chart, labs, diagnostics reviewed. He continues to feel better. Less weakness. No cough. No fever. Concerned about recurrence of UTis. Previously seen by urology. Objective:    Vital Signs:    Visit Vitals  BP (!) 143/55 (BP 1 Location: Right leg, BP Patient Position: At rest)   Pulse 90   Temp 98.2 °F (36.8 °C)   Resp 22   Ht 6' 2\" (1.88 m)   Wt 103.2 kg (227 lb 8 oz)   SpO2 98%   BMI 29.21 kg/m²          Intake/Output Summary (Last 24 hours) at 2021 1402  Last data filed at 2021 0816  Gross per 24 hour   Intake 540 ml   Output 300 ml   Net 240 ml        Physical Examination:    General:   Weak and ill looking but not in any acute distress   Eyes:   pink conjunctivae, PERRLA with no discharge. ENT:   no ottorrhea or rhinorrhea with dry mucous membranes  Neck: no masses, thyroid non-tender and trachea central.  Pulm:  decreased breath sounds without crackles or wheezes  Card:  no JVD or murmurs, has regular and normal S1, S2 without thrills, bruits or peripheral edema  Abd:  Soft, non-tender, non-distended, normoactive bowel sounds   Musc:  No cyanosis, clubbing, atrophy but LUE amputation   Skin:  No rashes, bruising or ulcers. Neuro: Awake and alert. Generally weak.  Follows commands appropriately  Psych:  Has a good insight and is oriented x 3    Current Facility-Administered Medications   Medication Dose Route Frequency    cefTRIAXone (ROCEPHIN) 2 g in sterile water (preservative free) 20 mL IV syringe  2 g IntraVENous DAILY    doxycycline (VIBRAMYCIN) 100 mg in 0.9% sodium chloride (MBP/ADV) 100 mL MBP 100 mg IntraVENous Q12H    atorvastatin (LIPITOR) tablet 20 mg  20 mg Oral QHS    tamsulosin (FLOMAX) capsule 0.8 mg  0.8 mg Oral DAILY    sodium chloride (NS) flush 5-40 mL  5-40 mL IntraVENous Q8H    sodium chloride (NS) flush 5-40 mL  5-40 mL IntraVENous PRN    acetaminophen (TYLENOL) tablet 650 mg  650 mg Oral Q6H PRN    Or    acetaminophen (TYLENOL) suppository 650 mg  650 mg Rectal Q6H PRN    polyethylene glycol (MIRALAX) packet 17 g  17 g Oral DAILY PRN    promethazine (PHENERGAN) tablet 12.5 mg  12.5 mg Oral Q6H PRN    Or    ondansetron (ZOFRAN) injection 4 mg  4 mg IntraVENous Q6H PRN    enoxaparin (LOVENOX) injection 40 mg  40 mg SubCUTAneous DAILY        Laboratory data and review:    Recent Labs     04/28/21  0539 04/27/21  0352 04/26/21  0751   WBC 9.0 14.6* 18.5*   HGB 10.6* 10.3* 11.4*   HCT 36.2* 34.9* 36.6   PLT 71* 76* 89*     Recent Labs     04/27/21  0352 04/26/21  0751    139   K 3.7 3.5   * 111*   CO2 24 20*   * 142*   BUN 22* 19   CREA 0.99 0.95   CA 7.8* 8.0*   ALB  --  3.3*   ALT  --  18     No components found for: [place]    Diagnostics: Imaging studies have been reviewed    Telemetry reviewed by me:   normal sinus rhythm    Assessment and Plan:    Sepsis due to urinary tract infection + ? Pneumonia involving left lung (4/26/2021) POA: he has had recurrent UTIs. Has urine incontinence and polycystic kidney disease. Blood cultures reported positive in ERROR. Lab confirm his blood cultures are all neg. Urine cultures grew Klebsiella pneumoniae. CT abdomen and pelvis showed a left basal consolidation. Change to IV Ceftriaxone + Doxycyline. Consult ID     CAD (coronary artery disease) POA: Overview: s/p stent x2. saw Dr. Eliezer Giron in [place]. He remains stable. Continue to hold BB due to low borderline BPs.  Continue Simvastatin    Post-polio syndrome POA: Overview: weakness, R deltoid weakness, s/p L arm amputation, R hip pain, R leg-ankle brace POA: continue supportive care    BPH (benign prostatic hyperplasia) POA: Continue Flomax     HTN (hypertension) (10/25/2019) POA: BP currently low normal. OK to hold BB    Thrombocytopenia ([address] (10/26/2019) POA: likely chronic vs triggered with sepsis episodes. Previously seen by hematology and no further testing recommended at that time. Monitor while here.  DC enoxaparin    Hypercholesterolemia POA: Continue Simvastatin     Total time spent for the patient's care: [facility] discussed with: Patient, Family, Care Manager and Nursing Staff    Discussed:  Care Plan and D/C Planning    Prophylaxis:  Lovenox    Anticipated Disposition:  ART PT, OT, RN           ___________________________________________________    Attending Physician:   Vinod Hawkins MD

## 2021-04-28 NOTE — PROGRESS NOTES
Problem: Falls - Risk of  Goal: *Absence of Falls  Description: Document Lear Shaker Fall Risk and appropriate interventions in the flowsheet. Outcome: Progressing Towards Goal  Note: Fall Risk Interventions:  Mobility Interventions: Communicate number of staff needed for ambulation/transfer, Bed/chair exit alarm, Patient to call before getting OOB         Medication Interventions: Bed/chair exit alarm, Evaluate medications/consider consulting pharmacy, Patient to call before getting OOB, Teach patient to arise slowly, Utilize gait belt for transfers/ambulation    Elimination Interventions: Bed/chair exit alarm, Call light in reach, Patient to call for help with toileting needs, Stay With Me (per policy), Toileting schedule/hourly rounds, Toilet paper/wipes in reach, Urinal in reach    History of Falls Interventions: Bed/chair exit alarm, Consult care management for discharge planning, Door open when patient unattended, Investigate reason for fall, Utilize gait belt for transfer/ambulation, Assess for delayed presentation/identification of injury for 48 hrs (comment for end date), Room close to nurse's station, Evaluate medications/consider consulting pharmacy, Vital signs minimum Q4HRs X 24 hrs (comment for end date)         Problem: Patient Education: Go to Patient Education Activity  Goal: Patient/Family Education  Outcome: Progressing Towards Goal     Problem: Pressure Injury - Risk of  Goal: *Prevention of pressure injury  Description: Document Alan Scale and appropriate interventions in the flowsheet.   Outcome: Progressing Towards Goal  Note: Pressure Injury Interventions:  Sensory Interventions: Assess changes in LOC, Assess need for specialty bed, Check visual cues for pain, Discuss PT/OT consult with provider, Float heels, Keep linens dry and wrinkle-free, Maintain/enhance activity level, Minimize linen layers, Monitor skin under medical devices, Pressure redistribution bed/mattress (bed type), Pad between skin to skin, Turn and reposition approx. every two hours (pillows and wedges if needed)    Moisture Interventions: Absorbent underpads, Apply protective barrier, creams and emollients, Check for incontinence Q2 hours and as needed, Limit adult briefs, Maintain skin hydration (lotion/cream), Minimize layers, Moisture barrier, Offer toileting Q_hr    Activity Interventions: Increase time out of bed, Pressure redistribution bed/mattress(bed type), PT/OT evaluation    Mobility Interventions: Float heels, HOB 30 degrees or less, Pressure redistribution bed/mattress (bed type), PT/OT evaluation, Turn and reposition approx.  every two hours(pillow and wedges)    Nutrition Interventions: Document food/fluid/supplement intake, Discuss nutritional consult with provider, Offer support with meals,snacks and hydration    Friction and Shear Interventions: Apply protective barrier, creams and emollients, HOB 30 degrees or less, Lift team/patient mobility team, Lift sheet                Problem: Patient Education: Go to Patient Education Activity  Goal: Patient/Family Education  Outcome: Progressing Towards Goal

## 2021-04-28 NOTE — PROGRESS NOTES
CM follow up:    Patient admitted for sepsis, UTI, thrombocytopenia, chills/fever, malaise. Hx post polio syndrome - wheelchair bound, BPH, CAD.     RUR score 12%/ low risk     Current status  Patient requiring medical management including IV antibiotics and oxygen at 2L/nc. Met with patient and his daughter Enrique Coy at bedside, face mask and goggles on. Patient lives at [address] [place] [address] with his spouse who has dementia and is forgetful. Patient wants to return home with assistance. Discussed personal care assistance and home health follow up. Personal care aide provider list and home health agency provider list given to patient. He and his daughter are agreeable with home health nursing at [facility], Order needed.     Transition of Care Plan  1. Monitor patient status and response to treatment. 2. Medical management continues. 3. Patient likely to discharge home, recommend home health nursing follow up, need order.   4. Family will transport at [facility], patient's daughter Enrique Coy and her  will assist.   5. CM to monitor progress and recommendations.     Lakeisha Lima RN, MSN/Care manager

## 2021-04-29 ENCOUNTER — TELEPHONE (OUTPATIENT)
Dept: INTERNAL MEDICINE CLINIC | Age: 81
End: 2021-04-29

## 2021-04-29 VITALS
TEMPERATURE: 97.6 F | SYSTOLIC BLOOD PRESSURE: 124 MMHG | BODY MASS INDEX: 28.68 KG/M2 | HEART RATE: 117 BPM | OXYGEN SATURATION: 99 % | WEIGHT: 223.5 LBS | DIASTOLIC BLOOD PRESSURE: 76 MMHG | HEIGHT: 74 IN | RESPIRATION RATE: 19 BRPM

## 2021-04-29 LAB
BASOPHILS # BLD: 0 K/UL (ref 0–0.1)
BASOPHILS NFR BLD: 0 % (ref 0–1)
COMMENT, HOLDF: NORMAL
DIFFERENTIAL METHOD BLD: ABNORMAL
EOSINOPHIL # BLD: 0 K/UL (ref 0–0.4)
EOSINOPHIL NFR BLD: 0 % (ref 0–7)
ERYTHROCYTE [DISTWIDTH] IN BLOOD BY AUTOMATED COUNT: 15.7 % (ref 11.5–14.5)
HCT VFR BLD AUTO: 34.9 % (ref 36.6–50.3)
HGB BLD-MCNC: 10.6 G/DL (ref 12.1–17)
IMM GRANULOCYTES # BLD AUTO: 0.1 K/UL (ref 0–0.04)
IMM GRANULOCYTES NFR BLD AUTO: 1 % (ref 0–0.5)
LYMPHOCYTES # BLD: 0.7 K/UL (ref 0.8–3.5)
LYMPHOCYTES NFR BLD: 13 % (ref 12–49)
MCH RBC QN AUTO: 28.4 PG (ref 26–34)
MCHC RBC AUTO-ENTMCNC: 30.4 G/DL (ref 30–36.5)
MCV RBC AUTO: 93.6 FL (ref 80–99)
MONOCYTES # BLD: 0.4 K/UL (ref 0–1)
MONOCYTES NFR BLD: 8 % (ref 5–13)
NEUTS SEG # BLD: 4.4 K/UL (ref 1.8–8)
NEUTS SEG NFR BLD: 78 % (ref 32–75)
NRBC # BLD: 0 K/UL (ref 0–0.01)
NRBC BLD-RTO: 0 PER 100 WBC
PLATELET # BLD AUTO: 76 K/UL (ref 150–400)
PMV BLD AUTO: 12.3 FL (ref 8.9–12.9)
RBC # BLD AUTO: 3.73 M/UL (ref 4.1–5.7)
RBC MORPH BLD: ABNORMAL
RBC MORPH BLD: ABNORMAL
SAMPLES BEING HELD,HOLD: NORMAL
WBC # BLD AUTO: 5.6 K/UL (ref 4.1–11.1)

## 2021-04-29 PROCEDURE — 74011000258 HC RX REV CODE- 258: Performed by: INTERNAL MEDICINE

## 2021-04-29 PROCEDURE — 74011250636 HC RX REV CODE- 250/636: Performed by: INTERNAL MEDICINE

## 2021-04-29 PROCEDURE — 99222 1ST HOSP IP/OBS MODERATE 55: CPT | Performed by: INTERNAL MEDICINE

## 2021-04-29 PROCEDURE — 85025 COMPLETE CBC W/AUTO DIFF WBC: CPT

## 2021-04-29 PROCEDURE — 77010033678 HC OXYGEN DAILY

## 2021-04-29 PROCEDURE — 94760 N-INVAS EAR/PLS OXIMETRY 1: CPT

## 2021-04-29 PROCEDURE — 74011000250 HC RX REV CODE- 250: Performed by: INTERNAL MEDICINE

## 2021-04-29 RX ORDER — CIPROFLOXACIN 500 MG/1
500 TABLET ORAL 2 TIMES DAILY
Qty: 20 TAB | Refills: 0 | Status: SHIPPED | OUTPATIENT
Start: 2021-04-29 | End: 2021-05-03 | Stop reason: SDUPTHER

## 2021-04-29 RX ADMIN — Medication 10 ML: at 14:02

## 2021-04-29 RX ADMIN — DOXYCYCLINE 100 MG: 100 INJECTION, POWDER, LYOPHILIZED, FOR SOLUTION INTRAVENOUS at 09:59

## 2021-04-29 RX ADMIN — CEFTRIAXONE 2 G: 2 INJECTION, POWDER, FOR SOLUTION INTRAMUSCULAR; INTRAVENOUS at 09:59

## 2021-04-29 NOTE — DISCHARGE INSTRUCTIONS
[facility] Post Hospital/ED Visit Follow-Up Instructions/Information    You may have an in [facility] follow [facility] or may wish to contact [facility] to set-up a visit:    What are we? [facility] is an in-home urgent care service staffed with emergency trained medical teams. We come to your home in a vehicle stocked with medical supplies and technology. An ER physician is always available if needed. When? As a part of your hospital follow-up, an appointment has been/ or can be set up for us to come see you. Usually, this will be 24-72 hours after you leave the hospital or as needed. [facility] is open 7am-9pm, 7 days a week, 365 days a year, including holidays. Why? We know that you cannot always get to your doctor after being in the hospital and that your doctor is not always available when you need them. Once your workup is complete, we'll call in your prescriptions, update your family doctor, and handle billing with your insurance so you can focus on feeling better, faster without leaving home. How much? We accept most major health insurance plans, including Medicaid, Medicare, and Medicare Advantage [address] [place] [address], [facility], and [employer]. We also accept: credit, debit, health savings account (HSA), health reimbursement account (HRA) and flexible spending account (FSA) payments. [facility]'s prices compare to conventional urgent care facilities, but we bring the care to you. How to reach us? Getting care is easy- use our mobile jesus ([employer]), website ([employer]) or call us [phone].         HOSPITALIST DISCHARGE INSTRUCTIONS    NAME: Zana Chiu   :  1940   MRN:  [id number]     Date:     2021    ADMIT DATE: 2021     DISCHARGE DATE: 2021     PRINCIPAL ADMITTING DIAGNOSIS:  Sepsis due to urinary tract infection ([facility] [A41.9, N39.0]    DISCHARGE DIAGNOSES:  Principal Problem:    Sepsis due to urinary tract infection ([address] (4/26/2021)    Hypercholesterolemia     CAD (coronary artery disease): Overview: s/p stent x2. saw Dr. Katelynn Diaz in [place]    Post-polio syndrome : Overview: weakness, R deltoid weakness, s/p L arm amputation, R hip pain, R leg-ankle brace    BPH (benign prostatic hyperplasia) ()    HTN (hypertension) (10/25/2019)    Thrombocytopenia ([address] (10/26/2019)    MEDICATIONS:    · It is important that medications are taken exactly as they are prescribed on the discharge medication instructions and keep them your  in the bottles provided by the pharmacist.   · Keep a list of the medication names, dosages, and times to be taken at all times. · Do not take other medications without consulting your doctor. Recommended diet:  Cardiac Diet    Recommended activity: Activity as tolerated    Post discharge care:    Notify follow up health care provider or return to the emergency department if you cannot get hold of your doctor if you feel worse or experience symptoms similar to those that brought you to hospital    Follow-up Information     Follow up With Specialties Details Why Contact Info    Herbert Chavarria MD Internal Medicine On 5/3/2021 11:00am. Hospital follow up.   [address] [place] [address] [address]  [address] [facility]  [phone]      [facility]  As needed [address]   [address]  skilled nursing [address]  [phone]    Roman Srinivasan MD Internal Medicine Call for the regular follow up [address] [facility] [phone]      [facility]  Call to schedule follow up and review for the recurrent [address]Ti [address]  [address] [facility]          Information obtained by :  I understand that if any problems occur once I am at home I am to contact my physician and I understand and acknowledge receipt of the instructions indicated above.                                                                                                                                            Physician's or R.N.'s Signature                                                                  Date/Time                                                                                                                                              Patient or Representative Signature                                                          Date/Time

## 2021-04-29 NOTE — CONSULTS
Infectious Disease Consult    Impression/Plan   · Klebsiella UTI in setting of polycystic kidney disease. No evidence of pyelonephritis or cyst infection on CT scan. Plan discharge on cipro 500mg po  q 12 hours X 10 days. Discussed potential side effects of antibiotics with patient. Patient to contact me with any problems Recommend outpatient f/u with urology. · Thrombocytopenia. This appears to be chronic  · Post-polio syndrome   · BPH. I emphasized the importance of urology follow-up to manage BPH as urinary retention is a risk factor for recurrent urinary tract infections  · Abnormal CT scan of the chest.  There is partial consolidation of the left lower lobe which has worsened as compared to prior CT dated October 2019. Patient has no pulmonary complaints. He is not hypoxic. I doubt this represents pneumonia. Recommend outpatient follow-up with pulmonary for repeat CT scanning        Anti-infectives:   1. ceftriaxone    Subjective:   Date of Consultation:  April 29, 2021  Date of Admission: 4/26/2021   Referring Physician:     Marisol Kelly is a 78 y.o.  M with a PMHx post-polio syndrome, BPH, who presents with several days of chills, fever, malaise. Sx started Friday with R sided back pain and weakness. He waited it out and was going to call dispatch health this morning, but he became much weaker and fever higher so called EMT. He denies dysuria or frequency. Denies this is similar to orchitis pain that he had 3 mo ago. No cp, cough, sob, rash.      In ED he is noted to have tachycardia, leukocytosis, and pyuria. UC's growing klebsiella. Patient currently on ceftriaxone.      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 saw Dr. Antwan Humphrey in [place]    DNR (do not resuscitate) 4/17/2018    HTN (hypertension)     Hx of diverticulitis of colon     Hypercholesterolemia     Melanoma ([facility]     x4. one present on L shoulder stump. 2013. superficial per pt    Phantom pain ([facility]     L arm    Post-polio syndrome age 4    dx 2004 in [place]. He still drives. weakness, R deltoid weakness, s/p L arm amputation, R hip pain, R leg-ankle brace    Renal cyst     8 cm, increasing 20 years    Right hip pain     chronic, polio    Thrombocytopenia (HCC)     chronic. clumping seen 1/2021.  Venous insufficiency     s/p venous stripping    Weakness of right leg     Zoster 1982      Family History   Problem Relation Age of Onset    Diabetes Mother       Social History     Tobacco Use    Smoking status: Never Smoker    Smokeless tobacco: Never Used   Substance Use Topics    Alcohol use: No     Past Surgical History:   Procedure Laterality Date    HX AMPUTATION Left 1953    arm, from polio, phantom pain    HX APPENDECTOMY      HX COLONOSCOPY  2008?  HX CORONARY STENT PLACEMENT  2006    HX CORONARY STENT PLACEMENT  2004?  HX HERNIA REPAIR Left     HX TONSILLECTOMY      HX VEIN STRIPPING        Prior to Admission medications    Medication Sig Start Date End Date Taking?  Authorizing Provider   acetaminophen-codeine (TYLENOL #3) 300-30 mg per tablet Take 1 Tab by mouth two (2) times daily as needed for Pain (Severe pain). Yes Provider, Historical   acetaminophen (TYLENOL) 325 mg tablet Take 650 mg by mouth two (2) times daily as needed for Pain (Mild pain). Yes Provider, Historical   polyvinyl alcohol (LIQUIFILM TEARS) 1.4 % ophthalmic solution Administer 1 Drop to both eyes as needed (Dry eyes). Yes Provider, Historical   simvastatin (ZOCOR) 40 mg tablet TAKE 1 TABLET BY MOUTH EVERYDAY AT BEDTIME 21  Yes Savanna Chavarria MD   tamsulosin (FLOMAX) 0.4 mg capsule Take 2 Caps by mouth daily. 21  Yes Savanna Chavarria MD   metoprolol succinate (TOPROL-XL) 25 mg XL tablet Take 12.5 mg by mouth daily. Yes Provider, Historical   cyanocobalamin (Vitamin B-12) 1,000 mcg tablet Take 1,000 mcg by mouth daily. Yes Provider, Historical   cholecalciferol (Vitamin D3) (1000 Units /25 mcg) tablet Take 1,000 Units by mouth daily. Yes Provider, Historical   ibuprofen (MOTRIN) 200 mg tablet Take 400 mg by mouth every twelve (12) hours as needed for Pain (Moderate pain). Yes Provider, Historical   triamcinolone acetonide (KENALOG) 0.1 % topical cream Apply  to affected area two (2) times daily as needed for Skin Irritation. use thin layer 20  Yes Savanna Chavarria MD   ascorbic acid, vitamin C, (VITAMIN C) 1,000 mg tablet Take 1,000 mg by mouth daily. Yes Provider, Historical   therapeutic multivitamin (THERAGRAN) tablet Take 1 Tab by mouth daily. Yes Provider, Historical     No Known Allergies     Review of Systems:  A comprehensive review of systems was negative except for that written in the History of Present Illness. Objective:   Blood pressure 124/76, pulse (!) 117, temperature 97.6 °F (36.4 °C), resp. rate 19, height 6' 2\" (1.88 m), weight 223 lb 8 oz (101.4 kg), SpO2 99 %. Temp (24hrs), Av.3 °F (36.8 °C), Min:97.6 °F (36.4 °C), Max:98.6 °F (37 °C)       Exam:    General:  Alert, cooperative, well noursished, well developed, appears stated age   Eyes:  Sclera anicteric.     Mouth/Throat: Mucous membranes moist   Neck: Supple   Lungs:   No distress   CV:     Abdomen:   Non distended   Extremities:  Left arm amputation   Skin:  No rash   Lymph nodes:    Musculoskeletal:    Lines/Devices:   Peripheral   Psych: Alert and oriented, normal mood affect given the setting       Data Review:   Recent Results (from the past 24 hour(s))   CBC WITH AUTOMATED DIFF    Collection Time: 04/29/21  3:34 AM   Result Value Ref Range    WBC 5.6 4.1 - 11.1 K/uL    RBC 3.73 (L) 4.10 - 5.70 M/uL    HGB 10.6 (L) 12.1 - 17.0 g/dL    HCT 34.9 (L) 36.6 - 50.3 %    MCV 93.6 80.0 - 99.0 FL    MCH 28.4 26.0 - 34.0 PG    MCHC 30.4 30.0 - 36.5 g/dL    RDW 15.7 (H) 11.5 - 14.5 %    PLATELET 76 (L) 367 - 400 K/uL    MPV 12.3 8.9 - 12.9 FL    NRBC 0.0 0  WBC    ABSOLUTE NRBC 0.00 0.00 - 0.01 K/uL    NEUTROPHILS 78 (H) 32 - 75 %    LYMPHOCYTES 13 12 - 49 %    MONOCYTES 8 5 - 13 %    EOSINOPHILS 0 0 - 7 %    BASOPHILS 0 0 - 1 %    IMMATURE GRANULOCYTES 1 (H) 0.0 - 0.5 %    ABS. NEUTROPHILS 4.4 1.8 - 8.0 K/UL    ABS. LYMPHOCYTES 0.7 (L) 0.8 - 3.5 K/UL    ABS. MONOCYTES 0.4 0.0 - 1.0 K/UL    ABS. EOSINOPHILS 0.0 0.0 - 0.4 K/UL    ABS. BASOPHILS 0.0 0.0 - 0.1 K/UL    ABS. IMM. GRANS. 0.1 (H) 0.00 - 0.04 K/UL    DF SMEAR SCANNED      RBC COMMENTS ANISOCYTOSIS  2+        RBC COMMENTS OVALOCYTES  1+       SAMPLES BEING HELD    Collection Time: 04/29/21  3:34 AM   Result Value Ref Range    SAMPLES BEING HELD 1PST     COMMENT        Add-on orders for these samples will be processed based on acceptable specimen integrity and analyte stability, which may vary by analyte.         Microbiology:      Studies:      Signed By: Daryle Po, DO     April 29, 2021

## 2021-04-29 NOTE — PROGRESS NOTES
0700 Bedside shift change report given to Nina RN (oncoming nurse) by Milo Neal RN (offgoing nurse). Report included the following information SBAR, Kardex, ED Summary, Procedure Summary, Intake/Output, MAR, Recent Results and Cardiac Rhythm NSR.     1405 I have reviewed discharge instructions with the patient. The patient verbalized understanding. Patient has no further questions. 5 Daughter arrived to unit for pt d/c. This patient was assisted with Intentional Toileting every 2 hours during this shift. Documentation of ambulation and output reflected on Flowsheet.

## 2021-04-29 NOTE — DISCHARGE SUMMARY
Hunter Malik [facility] 79  [address] [facility], [address] [place] [address] Nw  Tel: [phone]    Physician Discharge Summary    Patient ID:    Dagoberto Hendricks  Age:              78 y.o.    : 1940  MRN:             [id number]     PCP: Val Ashley MD     Date of Admission: 2021    Date of Discharge:  2021    Principal admission Diagnosis:   Sepsis due to urinary tract infection ([address] [A41.9, N39.0]    Discharge Diagnoses:  Principal Problem:    Sepsis due to urinary tract infection ([address] (2021)    CAD (coronary artery disease): Overview: s/p stent x2. saw Dr. Candace Devi in [place] MD    Post-polio syndrome: Overview: weakness, R deltoid weakness, s/p L arm amputation, R hip pain, R leg-ankle brace    BPH (benign prostatic hyperplasia)     HTN (hypertension) (10/25/2019)    Thrombocytopenia ([address] (10/26/2019)    Pneumonia involving left lung (2021)    Hypercholesterolemia     Hospital Course:     Mr. Alphonso Devine is a 78 y.o. admitted to [facility] [place] and treated for the following:    Sepsis due to urinary tract infection (2021) POA: he has had recurrent UTIs. Has urine incontinence and polycystic kidney disease. Blood cultures reported positive in ERROR. Lab confirm his blood cultures are all neg. Urine cultures grew Klebsiella pneumoniae. CT abdomen and pelvis showed a left basal consolidation but he had no respiratory symptoms. Has been on IV Ceftriaxone + Doxycyline. Seen by ID and he will continue with Ciprofloxacin     Coronary artery disease) POA: Overview: s/p stent x2. saw Dr. Candace Devi in [place] MD. He remains stable. Continue BB and Simvastatin     Post-polio syndrome POA: Overview: weakness, R deltoid weakness, s/p L arm amputation, R hip pain, R leg-ankle brace POA: continue supportive care     BPH (benign prostatic hyperplasia) POA: Continue Flomax      HTN (hypertension) (10/25/2019) POA: BP stable.  On BB      Thrombocytopenia ([facility] (10/26/2019) POA: likely chronic vs triggered with sepsis episodes. Previously seen by hematology and no further testing recommended at that time. Monitor while here. DC enoxaparin     Hypercholesterolemia POA: Continue Simvastatin     Discharge Exam:    Visit Vitals  /76 (BP 1 Location: Right upper arm, BP Patient Position: Sitting)   Pulse (!) 117   Temp 97.6 °F (36.4 °C)   Resp 19   Ht 6' 2\" (1.88 m)   Wt 101.4 kg (223 lb 8 oz)   SpO2 99%   BMI 28.70 kg/m²        Patient has been seen and examined. General: well looking and in no acute distress  Pulm: clear breath sounds without wheezes  Card: no murmurs, normal S1, S2 without thrills, bruits   Abd:    soft, non-tender, normoactive bowel sounds  Skin: no rashes and skin turgor is good  Neuro: awake, alert and has a non focal     Activity: Activity as tolerated    Diet: Cardiac Diet    Current Discharge Medication List      START taking these medications    Details   ciprofloxacin HCl (CIPRO) 500 mg tablet Take 1 Tab by mouth two (2) times a day for 10 days. Qty: 20 Tab, Refills: 0         CONTINUE these medications which have NOT CHANGED    Details   acetaminophen-codeine (TYLENOL #3) 300-30 mg per tablet Take 1 Tab by mouth two (2) times daily as needed for Pain (Severe pain). acetaminophen (TYLENOL) 325 mg tablet Take 650 mg by mouth two (2) times daily as needed for Pain (Mild pain). polyvinyl alcohol (LIQUIFILM TEARS) 1.4 % ophthalmic solution Administer 1 Drop to both eyes as needed (Dry eyes). simvastatin (ZOCOR) 40 mg tablet TAKE 1 TABLET BY MOUTH EVERYDAY AT BEDTIME  Qty: 90 Tab, Refills: 1    Associated Diagnoses: Hypercholesterolemia      tamsulosin (FLOMAX) 0.4 mg capsule Take 2 Caps by mouth daily. Qty: 180 Cap, Refills: 1      metoprolol succinate (TOPROL-XL) 25 mg XL tablet Take 12.5 mg by mouth daily. cyanocobalamin (Vitamin B-12) 1,000 mcg tablet Take 1,000 mcg by mouth daily.       cholecalciferol (Vitamin D3) (1000 Units /25 mcg) tablet Take 1,000 Units by mouth daily. triamcinolone acetonide (KENALOG) 0.1 % topical cream Apply  to affected area two (2) times daily as needed for Skin Irritation. use thin layer  Qty: 30 g, Refills: 5    Associated Diagnoses: Dermatitis      ascorbic acid, vitamin C, (VITAMIN C) 1,000 mg tablet Take 1,000 mg by mouth daily. therapeutic multivitamin (THERAGRAN) tablet Take 1 Tab by mouth daily. STOP taking these medications       ibuprofen (MOTRIN) 200 mg tablet Comments:   Reason for Stopping: Follow-up Information     Follow up With Specialties Details Why Contact Info    Bert Clayton 43, Apollo Hamilton MD Internal Medicine On 5/3/2021 11:00am. Hospital follow up. [address] [place] [address] 84  1007 [facility]  [phone]      [facility]  As needed [address]  [address] [facility] 800 [facility]    60 [facility]  skilled nursing [address] [place] [address]  [phone]    Cj Mckeon MD Internal Medicine Call for the regular follow up [facility] [phone]      [facility]  Call to schedule follow up and review for the recurrent UTi Jeremy Valle [id number]          Follow-up tests or labs: As noted above if any. Discharge Condition: Stable    Disposition: home    Time taken to arrange discharge:  35 minutes.     Signed:  Paige Anaya MD     [facility]  4/29/2021   1:31 PM

## 2021-04-29 NOTE — PROGRESS NOTES
Problem: Falls - Risk of  Goal: *Absence of Falls  Description: Document Becca Minaya Fall Risk and appropriate interventions in the flowsheet. 8/03/8754 8307 by Iram Lugo RN  Outcome: Resolved/Met  Note: Fall Risk Interventions:  Mobility Interventions: Bed/chair exit alarm, Communicate number of staff needed for ambulation/transfer, Patient to call before getting OOB         Medication Interventions: Bed/chair exit alarm, Patient to call before getting OOB    Elimination Interventions: Bed/chair exit alarm, Call light in reach, Patient to call for help with toileting needs, Urinal in reach    History of Falls Interventions: Bed/chair exit alarm, Vital signs minimum Q4HRs X 24 hrs (comment for end date), Room close to nurse's station      6/76/2147 4405 by Iram Lugo RN  Outcome: Resolved/Not Met  Note: Fall Risk Interventions:  Mobility Interventions: Bed/chair exit alarm, Communicate number of staff needed for ambulation/transfer, Patient to call before getting OOB         Medication Interventions: Bed/chair exit alarm, Patient to call before getting OOB    Elimination Interventions: Bed/chair exit alarm, Call light in reach, Patient to call for help with toileting needs, Urinal in reach    History of Falls Interventions: Bed/chair exit alarm, Vital signs minimum Q4HRs X 24 hrs (comment for end date), Room close to nurse's station         Problem: Patient Education: Go to Patient Education Activity  Goal: Patient/Family Education  8/32/0196 9531 by Iram Lugo RN  Outcome: Resolved/Met  9/71/4608 9731 by Iram Lugo RN  Outcome: Resolved/Not Met     Problem: Pressure Injury - Risk of  Goal: *Prevention of pressure injury  Description: Document Alan Scale and appropriate interventions in the flowsheet.   7/75/1890 1815 by Iram Lugo RN  Outcome: Resolved/Met  Note: Pressure Injury Interventions:  Sensory Interventions: Keep linens dry and wrinkle-free, Maintain/enhance activity level, Turn and reposition approx. every two hours (pillows and wedges if needed)    Moisture Interventions: Apply protective barrier, creams and emollients, Check for incontinence Q2 hours and as needed, Internal/External urinary devices    Activity Interventions: Increase time out of bed    Mobility Interventions: HOB 30 degrees or less, Turn and reposition approx. every two hours(pillow and wedges)    Nutrition Interventions: Document food/fluid/supplement intake, Offer support with meals,snacks and hydration    Friction and Shear Interventions: Apply protective barrier, creams and emollients             5/81/3827 5658 by Ruben Gomez RN  Outcome: Resolved/Not Met  Note: Pressure Injury Interventions:  Sensory Interventions: Keep linens dry and wrinkle-free, Maintain/enhance activity level, Turn and reposition approx. every two hours (pillows and wedges if needed)    Moisture Interventions: Apply protective barrier, creams and emollients, Check for incontinence Q2 hours and as needed, Internal/External urinary devices    Activity Interventions: Increase time out of bed    Mobility Interventions: HOB 30 degrees or less, Turn and reposition approx.  every two hours(pillow and wedges)    Nutrition Interventions: Document food/fluid/supplement intake, Offer support with meals,snacks and hydration    Friction and Shear Interventions: Apply protective barrier, creams and emollients                Problem: Patient Education: Go to Patient Education Activity  Goal: Patient/Family Education  2/43/6420 0589 by Ruben Gomez RN  Outcome: Resolved/Met  0/59/9871 2935 by Ruben Gomez RN  Outcome: Resolved/Not Met     Problem: Urinary Tract Infection - Adult  Goal: *Absence of infection signs and symptoms  2/88/6482 0553 by Ruben Gomez RN  Outcome: Resolved/Met  9/78/1889 1508 by Ruben Gomez RN  Outcome: Resolved/Not Met     Problem: Patient Education: Go to Patient Education Activity  Goal: Patient/Family Education  4/29/2021 1455 by Caro Harrison RN  Outcome: Resolved/Met  4/93/5119 1905 by Caro Harrison RN  Outcome: Resolved/Not Met     Problem: Patient Education: Go to Patient Education Activity  Goal: Patient/Family Education  0/06/8387 1359 by Caro Harrison, RN  Outcome: Resolved/Met  4/48/1185 1557 by Caro Harrison RN  Outcome: Resolved/Not Met     Problem: Pneumonia: Day 1  Goal: Off Pathway (Use only if patient is Off Pathway)  9/99/6843 9070 by Caro Harrison RN  Outcome: Resolved/Met  1/14/0755 7459 by Caro Harrison RN  Outcome: Resolved/Not Met  Goal: Activity/Safety  1/75/6839 3168 by Caro Harrison, RN  Outcome: Resolved/Met  1/70/1191 7292 by Caro Harrison RN  Outcome: Resolved/Not Met  Goal: Consults, if ordered  8/37/0677 6193 by Caro Harrison RN  Outcome: Resolved/Met  9/44/3168 6433 by Caro Harrison RN  Outcome: Resolved/Not Met  Goal: Diagnostic Test/Procedures  7/68/6336 1683 by Caro Harrison RN  Outcome: Resolved/Met  1/77/9531 4167 by Caro Harrison RN  Outcome: Resolved/Not Met  Goal: Nutrition/Diet  6/01/0768 6611 by Caro Harrison, RN  Outcome: Resolved/Met  0/61/9290 7661 by Caro Harrison RN  Outcome: Resolved/Not Met  Goal: Medications  1/64/9531 0898 by Caro Harrison RN  Outcome: Resolved/Met  8/79/9726 2057 by Caro Harrison RN  Outcome: Resolved/Not Met  Goal: Respiratory  1/95/7374 1348 by Caro Harrison RN  Outcome: Resolved/Met  2/22/4971 3111 by Caro Harrison RN  Outcome: Resolved/Not Met  Goal: Treatments/Interventions/Procedures  4/62/5881 5380 by Caro Harrison, RN  Outcome: Resolved/Met  3/30/4361 4303 by Caro Harrison RN  Outcome: Resolved/Not Met  Goal: Psychosocial  2/31/0309 9661 by Caro Harrison RN  Outcome: Resolved/Met  1/25/0015 7826 by Caro Harrison RN  Outcome: Resolved/Not Met  Goal: *Oxygen saturation within defined limits  4/93/0905 1537 by Caro Harrison RN  Outcome: Resolved/Met  8/84/4691 3963 by Caro Harrison RN  Outcome: Resolved/Not Met  Goal: *Influenza vaccine administered (October-March)  4/29/2021 7408 by Josepha Frankel, RN  Outcome: Resolved/Met  8/43/3166 0756 by Josepha Frankel, RN  Outcome: Resolved/Not Met  Goal: *Pneumoccocal vaccine administered  5/29/3315 4229 by Josepha Frankel, RN  Outcome: Resolved/Met  3/39/7539 7571 by Josepha Frankel, RN  Outcome: Resolved/Not Met  Goal: *Hemodynamically stable  9/48/9017 2062 by Josepha Frankel, RN  Outcome: Resolved/Met  8/69/0198 4268 by Josepha Frankel, RN  Outcome: Resolved/Not Met  Goal: *Demonstrates progressive activity  6/28/6972 3331 by Josepha Frankel, RN  Outcome: Resolved/Met  4/87/0214 1051 by Josepha Frankel, RN  Outcome: Resolved/Not Met  Goal: *Tolerating diet  3/60/0479 8076 by Josepha Frankel, RN  Outcome: Resolved/Met  3/45/7103 8918 by Josepha Frankel, RN  Outcome: Resolved/Not Met     Problem: Pneumonia: Day 2  Goal: Off Pathway (Use only if patient is Off Pathway)  2/26/4057 7983 by Josepha Frankel, RN  Outcome: Resolved/Met  0/04/3874 0649 by Josepha Frankel, RN  Outcome: Resolved/Not Met  Goal: Activity/Safety  7/31/0665 1837 by Josepha Frankel, RN  Outcome: Resolved/Met  6/76/5745 6503 by Josepha Frankel, RN  Outcome: Resolved/Not Met  Goal: Consults, if ordered  1/95/3626 8297 by Josepha Frankel, RN  Outcome: Resolved/Met  9/72/3461 4001 by Josepha Frankel, RN  Outcome: Resolved/Not Met  Goal: Diagnostic Test/Procedures  7/53/8920 0755 by Josepha Frankel, RN  Outcome: Resolved/Met  5/26/5071 5235 by Josepha Frankel, RN  Outcome: Resolved/Not Met  Goal: Nutrition/Diet  9/98/4339 3206 by Josepha Frankel, RN  Outcome: Resolved/Met  2/86/4458 2712 by Josepha Frankel, RN  Outcome: Resolved/Not Met  Goal: Discharge Planning  3/77/0760 1366 by Josepha Frankel, RN  Outcome: Resolved/Met  7/56/1963 7581 by Josepha Frankel, RN  Outcome: Resolved/Not Met  Goal: Medications  6/63/2901 8525 by Josepha Frankel, RN  Outcome: Resolved/Met  1/18/1173 2216 by Josepha Frankel, RN  Outcome: Resolved/Not Met  Goal: Respiratory  6/61/7416 0311 by Josepha Frankel, RN  Outcome: Resolved/Met  7/27/5427 3288 by Josepha Frankel, RN  Outcome: Resolved/Not Met  Goal: Treatments/Interventions/Procedures  0/42/5763 9774 by Elida Castellanos, RN  Outcome: Resolved/Met  0/86/2253 9454 by Elida Castellanos, RN  Outcome: Resolved/Not Met  Goal: Psychosocial  4/69/6474 1866 by Elida Castellanos, RN  Outcome: Resolved/Met  4/59/1754 2258 by Elida Castellanos, RN  Outcome: Resolved/Not Met  Goal: *Oxygen saturation within defined limits  9/71/1076 6405 by Elida Castellanos, RN  Outcome: Resolved/Met  3/55/7353 6594 by Elida Castellanos, RN  Outcome: Resolved/Not Met  Goal: *Hemodynamically stable  6/03/6113 1851 by Elida Castellanos, RN  Outcome: Resolved/Met  3/94/2927 4881 by Elida Castellanos, RN  Outcome: Resolved/Not Met  Goal: *Demonstrates progressive activity  1/08/5702 7819 by Elida Castellanos, RN  Outcome: Resolved/Met  0/03/8477 2097 by Elida Castellanos, RN  Outcome: Resolved/Not Met  Goal: *Tolerating diet  6/12/5601 6125 by Elida Castellanos, RN  Outcome: Resolved/Met  8/39/3762 6800 by Elida Castellanos, RN  Outcome: Resolved/Not Met  Goal: *Optimal pain control at patient's stated goal  5/80/0550 0121 by Elida Castellanos, RN  Outcome: Resolved/Met  1/18/7370 3184 by Elida Castellanos, RN  Outcome: Resolved/Not Met     Problem: Pneumonia: Day 3  Goal: Off Pathway (Use only if patient is Off Pathway)  7/55/5132 8948 by Elida Castellanos, RN  Outcome: Resolved/Met  3/89/2909 8930 by Elida Castellanos RN  Outcome: Resolved/Not Met  Goal: Activity/Safety  6/01/3572 1434 by Elida Castellanos, RN  Outcome: Resolved/Met  5/95/0233 7982 by Elida Castellanos, RN  Outcome: Resolved/Not Met  Goal: Consults, if ordered  8/38/1793 8924 by Elida Castellanos, RN  Outcome: Resolved/Met  6/78/4370 2643 by Elida Castellanos, RN  Outcome: Resolved/Not Met  Goal: Diagnostic Test/Procedures  3/91/1963 0953 by Elida Castellanos RN  Outcome: Resolved/Met  4/13/9157 5697 by Elida Castellanos RN  Outcome: Resolved/Not Met  Goal: Nutrition/Diet  6/18/2550 9605 by Elida Castellanos RN  Outcome: Resolved/Met  6/02/4187 7001 by Elida Castellanos RN  Outcome: Resolved/Not Met  Goal: Discharge Planning  8/27/7381 3855 by Daija Falling, RN  Outcome: Resolved/Met  3/99/4262 0301 by Daija Falling, RN  Outcome: Resolved/Not Met  Goal: Medications  6/98/8055 4129 by Daija Falling, RN  Outcome: Resolved/Met  8/03/2097 8056 by Daija Falling, RN  Outcome: Resolved/Not Met  Goal: Respiratory  9/29/5849 0261 by Daija Falling, RN  Outcome: Resolved/Met  7/27/8098 8196 by Daija Falling, RN  Outcome: Resolved/Not Met  Goal: Treatments/Interventions/Procedures  0/93/8013 9135 by Daija Falling, RN  Outcome: Resolved/Met  7/36/6711 0786 by Daija Falling, RN  Outcome: Resolved/Not Met  Goal: Psychosocial  1/33/7076 8291 by Daija Falling, RN  Outcome: Resolved/Met  2/76/1990 8889 by Daija Falling, RN  Outcome: Resolved/Not Met  Goal: *Oxygen saturation within defined limits  8/63/0834 5942 by Daija Falling, RN  Outcome: Resolved/Met  2/82/3569 0817 by Daija Falling, RN  Outcome: Resolved/Not Met  Goal: *Hemodynamically stable  7/37/1622 4289 by Daija Falling, RN  Outcome: Resolved/Met  9/01/4303 3506 by Daija Falling, RN  Outcome: Resolved/Not Met  Goal: *Demonstrates progressive activity  5/70/5874 4799 by Daija Falling, RN  Outcome: Resolved/Met  9/45/5711 3796 by Daija Falling, RN  Outcome: Resolved/Not Met  Goal: *Tolerating diet  1/85/4732 5339 by Daija Falling, RN  Outcome: Resolved/Met  9/78/4815 4469 by Daija Falling, RN  Outcome: Resolved/Not Met  Goal: *Describes available resources and support systems  7/72/5649 4353 by Daija Falling, RN  Outcome: Resolved/Met  3/94/7109 0034 by Daija Falling, RN  Outcome: Resolved/Not Met  Goal: *Optimal pain control at patient's stated goal  9/49/0657 4064 by Daija Falling, RN  Outcome: Resolved/Met  0/70/5694 7852 by Daija Falling, RN  Outcome: Resolved/Not Met     Problem: Pneumonia: Day 4  Goal: Off Pathway (Use only if patient is Off Pathway)  5/73/8173 0792 by Daija Falling, RN  Outcome: Resolved/Met  8/21/2797 9272 by Daija Santana RN  Outcome: Resolved/Not Met  Goal: Activity/Safety  4/98/2454 5868 by Caro Harrison RN  Outcome: Resolved/Met  1/41/3105 7541 by Caro Harrison RN  Outcome: Resolved/Not Met  Goal: Nutrition/Diet  6/78/7867 5717 by Caro Harrison, SOLEDAD  Outcome: Resolved/Met  3/39/9171 7336 by Caro Harrison RN  Outcome: Resolved/Not Met  Goal: Discharge Planning  4/17/1253 6493 by Caro Harrison RN  Outcome: Resolved/Met  8/20/7190 2061 by Caro Harrison RN  Outcome: Resolved/Not Met  Goal: Medications  0/93/6194 5643 by Caro Harrison RN  Outcome: Resolved/Met  1/95/5952 6570 by Caro Harrison RN  Outcome: Resolved/Not Met  Goal: Respiratory  2/20/7603 0229 by Caro Harrison RN  Outcome: Resolved/Met  0/06/2231 6195 by Caro Harrison RN  Outcome: Resolved/Not Met  Goal: Treatments/Interventions/Procedures  1/37/3724 6994 by Caro Harrison RN  Outcome: Resolved/Met  1/59/4970 1585 by Caro Harrison RN  Outcome: Resolved/Not Met  Goal: Psychosocial  8/64/7017 6152 by Caro Harrison RN  Outcome: Resolved/Met  7/56/4302 1083 by Caro Harrison RN  Outcome: Resolved/Not Met     Problem: Pneumonia: Discharge Outcomes  Goal: *Demonstrates progressive activity  0/33/2435 9256 by Caro Harrison RN  Outcome: Resolved/Met  3/86/5318 1291 by Caro Harrison RN  Outcome: Resolved/Not Met  Goal: *Describes follow-up/return visits to physicians  8/22/8880 6588 by Caro Harrison RN  Outcome: Resolved/Met  6/28/7901 6346 by Caro Harrison RN  Outcome: Resolved/Not Met  Goal: *Tolerating diet  6/02/8919 1196 by Caro Harrison RN  Outcome: Resolved/Met  4/20/2325 2091 by Caro Harrison RN  Outcome: Resolved/Not Met  Goal: Leory Pop name, dosage, time, side effects, and number of days to continue medications  3/08/2417 3200 by Caro Harrison RN  Outcome: Resolved/Met  8/95/0051 1667 by Caro Harrison RN  Outcome: Resolved/Not Met  Goal: *Influenza immunization  4/04/1510 8487 by Caro Harrison RN  Outcome: Resolved/Met  5/59/8081 0640 by Caro Harrison RN  Outcome: Resolved/Not Met  Goal: *Pneumococcal immunization  1/97/8620 9730 by Aakash Llamas RN  Outcome: Resolved/Met  5/02/6842 5968 by Aakash Llamas RN  Outcome: Resolved/Not Met  Goal: *Respiratory status at baseline  1/71/4849 0311 by Aakash Llamas RN  Outcome: Resolved/Met  1/20/0296 8729 by Aakash Llamas RN  Outcome: Resolved/Not Met  Goal: *Vital signs within defined limits  3/23/4652 0596 by Aakash Llamas RN  Outcome: Resolved/Met  5/40/3723 6379 by Aakash Llamas RN  Outcome: Resolved/Not Met  Goal: *Describes available resources and support systems  7/61/6542 2808 by Aakash Llamas RN  Outcome: Resolved/Met  8/42/3536 9026 by Aakash Llamas RN  Outcome: Resolved/Not Met  Goal: *Optimal pain control at patient's stated goal  7/07/5902 6614 by Aakash Llamas RN  Outcome: Resolved/Met  2/85/8071 2179 by Aakash Llamas RN  Outcome: Resolved/Not Met

## 2021-04-29 NOTE — PROGRESS NOTES
Patient has been scheduled for follow up PCP BREANNE appointment on 5/3/21 at 11:20am with Dr. [facility]. AVS updated. Order received for home health nursing. Home health follow up discussed with patient. Home health provider list given to patient. Choice is [facility], referral sent via [facility]. [place] of Choice letter signed. Await response. Patient declined by [facility] care due to staffing. Referral sent to [facility] and [facility], await response. Patient ACCEPTED by [facility], AVS updated. Care Management Interventions  PCP Verified by CM: Yes([place] )  Mode of Transport at Discharge:  Other (see comment)(Family)  Transition of Care Consult (CM Consult): [address]: No  Reason Outside [place]: Unable to staff case  Judah Early: No  Discharge Durable Medical Equipment: No  Physical Therapy Consult: No  Occupational Therapy Consult: No  Speech Therapy Consult: No  Current Support Network: Lives with Spouse  Confirm Follow Up Transport: Family  The Plan for Transition of Care is Related to the Following Treatment Goals : return to [address]  The Patient and/or Patient Representative was Provided with a Choice of Provider and Agrees with the Discharge Plan?: (S) Yes  Freedom of Choice List was Provided with Basic Dialogue that Supports the Patient's Individualized Plan of Care/Goals, Treatment Preferences and Shares the Quality Data Associated with the Providers?: (S) Yes  Discharge Location  Discharge Placement: Home with home health    Milind Duff RN, MSN/Care manager

## 2021-04-29 NOTE — TELEPHONE ENCOUNTER
----- Message from [facility] sent at 4/29/2021  3:01 PM EDT -----  Regarding: Dr. Angelia Gonzalez Message/Vendor Calls    Caller's first and last name: Astrid Bunn      Reason for call: Will Dr. Sandra Liriano follow this pt for home health, nursing only?       Callback required yes/no and why: Yes      Best contact number(s): [phone]      Details to clarify the request: N/A      [facility]

## 2021-04-30 ENCOUNTER — PATIENT OUTREACH (OUTPATIENT)
Dept: CASE MANAGEMENT | Age: 81
End: 2021-04-30

## 2021-04-30 NOTE — PROGRESS NOTES
Physician Progress Note      Zo Stanley  CSN #:                  [id number]  :                       1940  ADMIT DATE:       2021 7:11 AM  DISCH DATE:        2021 3:09 PM  RESPONDING  PROVIDER #:        Jolene Ignacio MD          QUERY TEXT:    Dear Hospitalist Team,  Patient admitted with Sepsis. Documentation reflects Acute hypoxic respiratory failure within the H&P dated . If possible, please document in the progress notes and discharge summary if Acute Hypoxic Respiratory Failure was: The medical record reflects the following:    Risk Factors: 78 Yr F admitted with Sepsis, PNA and UTI    Clinical Indicators: Patient arrives to the ED from [facility] with c/o weakness and chills. Work up in the ED revealed WBC 18.5,  with UA indicative of UTI with UC positive for Klebsiella pneumoniae confirming. On admission patient with RR 20 90% on RA and placed on 2L via NC. Patient titrated up to 4L O2 for RR 25 and O2 sats of 92%. Patient has been on 4L O2 from  thur  at 2337 where O2 has slowly been titrated to currently 2L. Per H&P states, #AHRF: Presented without oxygen requirement and clear cxr. Now needing 4L and with crackles to L mid lobe, R base. Suspect either aspiration as pt now vomiting copiously with difficulty leaning forward 2/2 post-polio weakness vs pulm edema from aggressive fluid resuscitation. Presently on 4L NC. Treatment: Frequent monitoring/vital signs with supplemental oxygen as needed and repeat CXR.     Thank you,  Mahesh Salinas RN, [facility], [address] [place] [address]  Options provided:  -- Acute Hypoxic Respiratory Failure POA confirmed after study  -- Acute Hypoxic Respiratory Failure POA ruled out after study  -- Other - I will add my own diagnosis  -- Disagree - Not applicable / Not valid  -- Disagree - Clinically unable to determine / Unknown  -- Refer to Clinical Documentation Reviewer    PROVIDER RESPONSE TEXT:    Acute Hypoxic Respiratory Failure POA confirmed after study. Query created by:  Alisa Downing on 4/28/2021 2:32 PM      Electronically signed by:  Giuseppe Machuca MD 4/30/2021 8:49 AM

## 2021-04-30 NOTE — PROGRESS NOTES
.  Care Transitions Initial Follow Up Call    Call within 2 business days of discharge: Yes     Patient: Katina Burton Patient : 1940 MRN: [id number]    Last Discharge [address]       Complaint Diagnosis Description Type Department Provider    21 Nausea; Chills Severe sepsis ([facility] . .. ED to Hosp-Admission (Discharged) (ADMIT) Kristina Donovan MD; Emanuel Camacho. .. Was this an external facility discharge? No     Challenges to be reviewed by the provider   Additional needs identified to be addressed with provider no         Method of communication with provider : chart routing    Discussed COVID-19 related testing which was not done at this time. Advance Care Planning:   Does patient have an Advance Directive: yes, reviewed and current     Inpatient Readmission Risk score: Unplanned Readmit Risk Score: 6    Was this a readmission? no   Patient stated reason for the admission: weak,nausea,chills    Patients top risk factors for readmission: medical condition- sepsis d/t UTI. H/o :  CAD/polio/HTN  Interventions to address risk factors: Scheduled appointment with PCP- 5/3 11 am, Scheduled appointment with Angelica Raman and Obtained and reviewed discharge summary and/or continuity of care documents    Care Transition Nurse (CTN) contacted the patient by telephone to perform post hospital discharge assessment. Verified name and  with patient as identifiers. Provided introduction to self, and explanation of the CTN role. Patient reports he is good, feeling better. Just tired. Drinking more water. Denies any burning with urination. CTN reviewed discharge instructions, medical action plan and red flags with patient who verbalized understanding. Were discharge instructions available to patient? yes.  Reviewed appropriate site of care based on symptoms and resources available to patient including: PCP, Specialist, Urgent [address], When to call 911 and [facility]. Patient given an opportunity to ask questions and does not have any further questions or concerns at this time. The patient agrees to contact the PCP office for questions related to their healthcare. Medication reconciliation was performed with patient, who verbalizes understanding of administration of home medications. Advised obtaining a 90-day supply of all daily and as-needed medications. Referral to Pharm D needed: no     Home Health/Outpatient orders at discharge: home health care and [address]: [facility]  Date of initial visit: 5/1/21    Durable Medical Equipment ordered at discharge: none  [facility] -  [phone] received: na    Covid Risk Education    Patient has following risk factors of: sepsis. Education provided regarding infection prevention, and signs and symptoms of COVID-19 and when to seek medical attention with patient who verbalized understanding. Discussed exposure protocols and quarantine From CDC: Are you at higher risk for severe illness?  and given an opportunity for questions and concerns. The patient agrees to contact the COVID-19 hotline [phone] or PCP office for questions related to COVID-19. For more information on steps you can take to protect yourself, see CDC's How to Protect Yourself     Was patient discharged with a pulse oximeter? no Discussed and confirmed pulse oximeter discharge instructions and when to notify provider or seek emergency care. Discussed follow-up appointments. If no appointment was previously scheduled, appointment scheduling offered: yes Is follow up appointment scheduled within 7 days of discharge? yes   [address] follow up appointment(s):   Future Appointments   Date Time Provider Deon Quintero   5/3/2021 11:20 AM Eloy Chavarria MD [facility]     [facility] follow up appointment(s): reminded to schedule appt with Urology.     Plan for follow-up call in 7-10 days based on severity of symptoms and risk factors. Plan for next call: symptom management-assess current symptoms, self management-following discharge instructions, follow up appointment-attended BREANNE with pcp and medication management-taking meds as ordered. CTN provided contact information for future needs. Goals Addressed                 This Visit's Progress     Prevent complications post hospitalization. 4/30/21 [facility] 4/26-4/29 Sepsis d/t UTI   Reviewed discharge instructions with patient   Reviewed meds- education provided on new meds.  Reviewed red flags: fever,nausea,vomiting,diarrhea,pain with urination,decreased amount of urine, dark color to urine or odor, sob, chest pain, increased weakness.  BREANNE with pcp, , 5/3 at 11am.   Reminded to schedule f/u with urology  [facility] to start 5/1.  Given CTN contact info if any questions/concerns.    CTN to check back in about a week, sooner [email or web address]

## 2021-05-03 ENCOUNTER — OFFICE VISIT (OUTPATIENT)
Dept: INTERNAL MEDICINE CLINIC | Age: 81
End: 2021-05-03
Payer: MEDICARE

## 2021-05-03 VITALS
RESPIRATION RATE: 12 BRPM | HEIGHT: 74 IN | TEMPERATURE: 97.9 F | HEART RATE: 89 BPM | BODY MASS INDEX: 29.24 KG/M2 | WEIGHT: 227.8 LBS | DIASTOLIC BLOOD PRESSURE: 79 MMHG | OXYGEN SATURATION: 96 % | SYSTOLIC BLOOD PRESSURE: 145 MMHG

## 2021-05-03 DIAGNOSIS — R39.81 FUNCTIONAL URINARY INCONTINENCE: ICD-10-CM

## 2021-05-03 DIAGNOSIS — A41.9 SEPSIS DUE TO URINARY TRACT INFECTION (HCC): Primary | ICD-10-CM

## 2021-05-03 DIAGNOSIS — R93.89 ABNORMAL CT OF THE CHEST: ICD-10-CM

## 2021-05-03 DIAGNOSIS — N39.0 SEPSIS DUE TO URINARY TRACT INFECTION (HCC): Primary | ICD-10-CM

## 2021-05-03 DIAGNOSIS — Q61.3 POLYCYSTIC KIDNEY DISEASE: ICD-10-CM

## 2021-05-03 DIAGNOSIS — N40.1 BENIGN PROSTATIC HYPERPLASIA WITH INCOMPLETE BLADDER EMPTYING: ICD-10-CM

## 2021-05-03 DIAGNOSIS — R39.14 BENIGN PROSTATIC HYPERPLASIA WITH INCOMPLETE BLADDER EMPTYING: ICD-10-CM

## 2021-05-03 DIAGNOSIS — B96.89 UTI DUE TO KLEBSIELLA SPECIES: ICD-10-CM

## 2021-05-03 DIAGNOSIS — N39.0 UTI DUE TO KLEBSIELLA SPECIES: ICD-10-CM

## 2021-05-03 DIAGNOSIS — D69.6 THROMBOCYTOPENIA (HCC): ICD-10-CM

## 2021-05-03 PROCEDURE — G8427 DOCREV CUR MEDS BY ELIG CLIN: HCPCS | Performed by: INTERNAL MEDICINE

## 2021-05-03 PROCEDURE — 99496 TRANSJ CARE MGMT HIGH F2F 7D: CPT | Performed by: INTERNAL MEDICINE

## 2021-05-03 RX ORDER — CIPROFLOXACIN 500 MG/1
500 TABLET ORAL 2 TIMES DAILY
Qty: 20 TAB | Refills: 0 | Status: SHIPPED | OUTPATIENT
Start: 2021-05-03 | End: 2021-10-20 | Stop reason: SDUPTHER

## 2021-05-04 LAB
BACTERIA SPEC CULT: NORMAL
SERVICE CMNT-IMP: NORMAL

## 2021-05-04 NOTE — PROGRESS NOTES
HISTORY OF PRESENT ILLNESS    Chief Complaint   Patient presents with   [facility] Follow Up     [facility] - Sepsis due to UTI       Presents for Transitional care management (TCM) visit s/p of hospital admission from 04/26 until 04/29/2021 at [facility].    Nurse Navigator call noted to patient and documented in [address] on 04/30/21. Hospital record and relevant lab results, test results and consult notes have personally been reviewed by me at this office visit. Medications reviewed and reconciled. Patient was hospitalized for:    Sepsis due to urinary tract infection (4/26/2021) POA: he has had recurrent UTIs. Has urine incontinence and polycystic kidney disease. Blood cultures reported positive in ERROR. Lab confirm his blood cultures are all neg. Urine cultures grew Klebsiella pneumoniae. CT abdomen and pelvis showed a left basal consolidation but he had no respiratory symptoms. Has been on IV Ceftriaxone + Doxycyline. Seen by ID and he will continue with Ciprofloxacin      Coronary artery disease) POA: Overview: s/p stent x2. saw Dr. Guanako Thompson in [place]. He remains stable. Continue BB and Simvastatin     Post-polio syndrome POA: Overview: weakness, R deltoid weakness, s/p L arm amputation, R hip pain, R leg-ankle brace POA: continue supportive care     BPH (benign prostatic hyperplasia) POA: Continue Flomax      HTN (hypertension) (10/25/2019) POA: BP stable. On BB      Thrombocytopenia ([address] (10/26/2019) POA: likely chronic vs triggered with sepsis episodes. Previously seen by hematology and no further testing recommended at that time. Monitor while here. DC enoxaparin    Today, he arrives unaccompanied. He took the [employer]. He is no longer driving. His wife has some degree of dementia. He is in his motorized wheelchair. States that he has no further fevers, chills or significant urinary symptoms. Tolerating Cipro and taking as prescribed.   He questions why he has had 2 urinary tract infections in 3 months. Chest CT showed bilateral airspace disease, left greater than right. This is slowly progressing compared to CT October 2019. He denies any cough or shortness of breath. Review of Systems   All other systems reviewed and are negative, except as noted in HPI    Past Medical and Surgical History   has a past medical history of Abnormal CT of the chest, BPH (benign prostatic hyperplasia), CAD in native artery, DNR (do not resuscitate) (4/17/2018), HTN (hypertension), diverticulitis of colon, Hypercholesterolemia, Melanoma ([address] Phantom pain ([address] Polycystic kidney disease, Post-polio syndrome (age 4), Renal cyst, Right hip pain, Thrombocytopenia ([address] Urinary incontinence, Venous insufficiency, Weakness of right leg, and Zoster (1982). 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 reports that he has never smoked. He has never used smokeless tobacco. He reports that he does not drink alcohol or use drugs. family history includes Diabetes in his mother. Physical Exam   Nursing note and vitals reviewed. Blood pressure (!) 145/79, pulse 89, temperature 97.9 °F (36.6 °C), temperature source Oral, resp. rate 12, height 6' 2\" (1.88 m), weight 227 lb 12.8 oz (103.3 kg), SpO2 96 %. Constitutional:  No distress. Eyes: Conjunctivae are normal.   Ears:  Hearing grossly intact  Cardiovascular: Normal rate. regular rhythm, no murmurs or gallops  No edema  Pulmonary/Chest: Effort normal.   CTAB  Musculoskeletal: moves all 4 extremities   Neurological: Alert and oriented to person, place, and time. Skin: No rash noted. Psychiatric: Normal mood and affect. Behavior is normal.     ASSESSMENT and PLAN  Diagnoses and all orders for this visit:    1.  Sepsis due to urinary tract infection ([facility]    2. UTI due to Klebsiella species  Currently asymptomatic, completing Cipro. He would like to check urinalysis at home if possible and he can purchase 1 of those home testing kits and certainly start Cipro as needed as needed for recurrence. Consider dispatch health for illness and unclear diagnosis. Difficulty with transportation at this point.  -     ciprofloxacin HCl (CIPRO) 500 mg tablet; Take 1 Tab by mouth two (2) times a day for 10 days. Take as needed for recurrent UTI  Indications: urinary tract infection caused by Klebsiella bacteria    3. Polycystic kidney disease  polycystic kidney disease. It is possible that backflow urinary could contribute to recurrent UTIs. 4. 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 He may have some overflow incontinence. Would benefit from follow-up with urology. He agrees, but may choose to do it virtually. Cont flomax    5. Benign prostatic hyperplasia with incomplete bladder emptying  He says that postvoid residuals were done in the hospital and he does not think there was a lot of residual urine. Will benefit from follow-up with urology. Perhaps consider procedure to open outflow tract? Cont flomax    6. Abnormal CT of the chest  He has no respiratory symptoms whatsoever. Consolidation of left lower lobe and mildly of the right lower lobe perhaps is secondary to neurologic effects of decreased diaphragmatic excursion from post polio syndrome. I think this is simply atelectasis. Could consider additional imaging but patient declines and I do not think it would change his management. 7. Thrombocytopenia ([facility]  He has chronic thrombocytopenia, exacerbated by infection but also likely pseudothrombocytopenia since it does tend to improve with a citrate tube. Monitor for now.       There are no Patient Instructions on file for this visit.    lab results and schedule of future lab studies reviewed with patient  reviewed medications and side effects in detail    Return to clinic for further evaluation if new symptoms develop        Current Outpatient Medications   Medication Sig    ciprofloxacin HCl (CIPRO) 500 mg tablet Take 1 Tab by mouth two (2) times a day for 10 days. Take as needed for recurrent UTI  Indications: urinary tract infection caused by Klebsiella bacteria    acetaminophen-codeine (TYLENOL #3) 300-30 mg per tablet Take 1 Tab by mouth two (2) times daily as needed for Pain (Severe pain).  acetaminophen (TYLENOL) 325 mg tablet Take 650 mg by mouth two (2) times daily as needed for Pain (Mild pain).  polyvinyl alcohol (LIQUIFILM TEARS) 1.4 % ophthalmic solution Administer 1 Drop to both eyes as needed (Dry eyes).  simvastatin (ZOCOR) 40 mg tablet TAKE 1 TABLET BY MOUTH EVERYDAY AT BEDTIME    tamsulosin (FLOMAX) 0.4 mg capsule Take 2 Caps by mouth daily.  metoprolol succinate (TOPROL-XL) 25 mg XL tablet Take 12.5 mg by mouth daily.  cyanocobalamin (Vitamin B-12) 1,000 mcg tablet Take 1,000 mcg by mouth daily.  cholecalciferol (Vitamin D3) (1000 Units /25 mcg) tablet Take 1,000 Units by mouth daily.  triamcinolone acetonide (KENALOG) 0.1 % topical cream Apply  to affected area two (2) times daily as needed for Skin Irritation. use thin layer    ascorbic acid, vitamin C, (VITAMIN C) 1,000 mg tablet Take 1,000 mg by mouth daily.  therapeutic multivitamin (THERAGRAN) tablet Take 1 Tab by mouth daily. No current facility-administered medications for this visit.

## 2021-05-18 ENCOUNTER — PATIENT OUTREACH (OUTPATIENT)
Dept: CASE MANAGEMENT | Age: 81
End: 2021-05-18

## 2021-05-18 NOTE — PROGRESS NOTES
Called and spoke to patient. Goals      Prevent complications post hospitalization. 4/30/21 [facility] 4/26-4/29 Sepsis d/t UTI   Reviewed discharge instructions with patient   Reviewed meds- education provided on new meds.  Reviewed red flags: fever,nausea,vomiting,diarrhea,pain with urination,decreased amount of urine, dark color to urine or odor, sob, chest pain, increased weakness.  BREANNE with pcp, , 5/3 at 11am.   Reminded to schedule f/u with urology  [facility] to start 5/1.  Given CTN contact info if any questions/concerns.  CTN to check back in about a week, sooner [email or web address]  5/18/21  Reports he feels good. More energy. Home Health nurses have seen him several times and said he looked good. No fever, urine clear and better flow. Completed antibiotic. No red flags noted. Reminded to call if concerns. mbt

## 2021-05-28 ENCOUNTER — PATIENT OUTREACH (OUTPATIENT)
Dept: CASE MANAGEMENT | Age: 81
End: 2021-05-28

## 2021-05-28 NOTE — PROGRESS NOTES
Patient has graduated from the Transitions of Care Coordination  program on 5/28/21. Patient/family has the ability to self-manage at this time Care management goals have been completed. Patient was not referred to the [facility] team for further management. Goals Addressed                 This Visit's Progress     COMPLETED: Prevent complications post hospitalization. 4/30/21 [facility] 4/26-4/29 Sepsis d/t UTI   Reviewed discharge instructions with patient   Reviewed meds- education provided on new meds.  Reviewed red flags: fever,nausea,vomiting,diarrhea,pain with urination,decreased amount of urine, dark color to urine or odor, sob, chest pain, increased weakness.  BREANNE with pcp, , 5/3 at 11am.   Reminded to schedule f/u with urology  [facility] to start 5/1.  Given CTN contact info if any questions/concerns.  CTN to check back in about a week, sooner [email or web address]  5/18/21  Reports he feels good. More energy. Home Health nurses have seen him several times and said he looked good. No fever, urine clear and better flow. Completed antibiotic. No red flags noted. Reminded to call if concerns. mbt  5/28/21  Says feels good, stronger. Saw Home Health nurse yesterday, all was good. No UTI symptoms, has bought OTC UTI test that pcp advised he use if has symptoms. Encouraged increase fluids. Wished him well. Advised f/u as recommended. mbt              Patient has Care Transition Nurse's contact information for any further questions, concerns, or needs. Patients upcoming visits:  No future appointments.

## 2021-09-13 DIAGNOSIS — E78.00 HYPERCHOLESTEROLEMIA: ICD-10-CM

## 2021-09-13 RX ORDER — SIMVASTATIN 40 MG/1
TABLET, FILM COATED ORAL
Qty: 90 TABLET | Refills: 1 | Status: SHIPPED | OUTPATIENT
Start: 2021-09-13 | End: 2022-04-13

## 2021-10-20 ENCOUNTER — PATIENT MESSAGE (OUTPATIENT)
Dept: INTERNAL MEDICINE CLINIC | Age: 81
End: 2021-10-20

## 2021-10-20 DIAGNOSIS — N39.0 UTI DUE TO KLEBSIELLA SPECIES: ICD-10-CM

## 2021-10-20 DIAGNOSIS — B96.89 UTI DUE TO KLEBSIELLA SPECIES: ICD-10-CM

## 2021-10-20 RX ORDER — CIPROFLOXACIN 500 MG/1
500 TABLET ORAL 2 TIMES DAILY
Qty: 20 TABLET | Refills: 2 | Status: SHIPPED | OUTPATIENT
Start: 2021-10-20 | End: 2022-03-29 | Stop reason: SDUPTHER

## 2021-10-28 NOTE — TELEPHONE ENCOUNTER
From: Renay Manzano  To: Sophy Orozco MD  Sent: 10/20/2021 9:12 AM EDT  Subject: Visit Follow-Up Question    Dr Cas Victor to home test kit for UTI and a reserve prescription of Cipro on hand to start immediately in event of positive test result, I have managed to avoid the hospital. Test result is now negative for leukocytes, and I feel good. Do you think I should come in, or arrange a video appointment? I would like a prescription refill for the Cipro, which I will finish tomorrow (500 mg, 20 pills) to keep on hand for when and if needed. My old immune system needs quick help when attacked! Thanks.   --Cleola Cipro

## 2021-11-02 ENCOUNTER — PATIENT MESSAGE (OUTPATIENT)
Dept: INTERNAL MEDICINE CLINIC | Age: 81
End: 2021-11-02

## 2021-11-02 DIAGNOSIS — G54.6 PHANTOM PAIN (HCC): Primary | ICD-10-CM

## 2021-11-02 DIAGNOSIS — G14 POST-POLIO SYNDROME: ICD-10-CM

## 2021-11-02 RX ORDER — ACETAMINOPHEN AND CODEINE PHOSPHATE 300; 30 MG/1; MG/1
1 TABLET ORAL
Qty: 60 TABLET | Refills: 0 | Status: SHIPPED | OUTPATIENT
Start: 2021-11-02 | End: 2022-02-17 | Stop reason: SDUPTHER

## 2021-11-02 NOTE — TELEPHONE ENCOUNTER
From: Ginny Cho  To: Cyndy Wong MD  Sent: 11/2/2021 11:12 AM EDT  Subject: Prescription Question    I would like a prescription for Tylenol 3. I have used it for 20 years for post polio muscle pain and phantom arm pain from amputation. You have prescribed it previously, but it is no longer on my list for renewal. I rotate with ibuprofen and regular Tylenol. On average will take one Tylenol 3 two or three times per week to help relax and take a nap.    --Ginny Cho

## 2022-01-01 ENCOUNTER — PATIENT MESSAGE (OUTPATIENT)
Dept: INTERNAL MEDICINE CLINIC | Age: 82
End: 2022-01-01

## 2022-01-01 ENCOUNTER — HOSPICE ADMISSION (OUTPATIENT)
Dept: HOSPICE | Facility: HOSPICE | Age: 82
End: 2022-01-01
Payer: MEDICARE

## 2022-01-01 ENCOUNTER — HOSPITAL ENCOUNTER (EMERGENCY)
Age: 82
Discharge: OTHER INSTITUTION | End: 2022-10-28
Attending: EMERGENCY MEDICINE
Payer: MEDICARE

## 2022-01-01 ENCOUNTER — APPOINTMENT (OUTPATIENT)
Dept: CT IMAGING | Age: 82
End: 2022-01-01
Attending: EMERGENCY MEDICINE
Payer: MEDICARE

## 2022-01-01 ENCOUNTER — HOSPITAL ENCOUNTER (INPATIENT)
Age: 82
LOS: 1 days | DRG: 951 | End: 2022-10-28
Attending: EMERGENCY MEDICINE | Admitting: FAMILY MEDICINE
Payer: OTHER MISCELLANEOUS

## 2022-01-01 VITALS
RESPIRATION RATE: 16 BRPM | WEIGHT: 224.87 LBS | HEIGHT: 73 IN | TEMPERATURE: 97 F | OXYGEN SATURATION: 96 % | HEART RATE: 108 BPM | SYSTOLIC BLOOD PRESSURE: 123 MMHG | DIASTOLIC BLOOD PRESSURE: 74 MMHG | BODY MASS INDEX: 29.8 KG/M2

## 2022-01-01 VITALS
OXYGEN SATURATION: 92 % | HEART RATE: 96 BPM | RESPIRATION RATE: 26 BRPM | HEIGHT: 73 IN | SYSTOLIC BLOOD PRESSURE: 159 MMHG | BODY MASS INDEX: 29.8 KG/M2 | TEMPERATURE: 99.2 F | WEIGHT: 224.87 LBS | DIASTOLIC BLOOD PRESSURE: 78 MMHG

## 2022-01-01 DIAGNOSIS — Z86.2 HISTORY OF THROMBOCYTOPENIA: ICD-10-CM

## 2022-01-01 DIAGNOSIS — S06.5XAA SDH (SUBDURAL HEMATOMA): Primary | ICD-10-CM

## 2022-01-01 DIAGNOSIS — J96.00 ACUTE RESPIRATORY FAILURE, UNSPECIFIED WHETHER WITH HYPOXIA OR HYPERCAPNIA (HCC): ICD-10-CM

## 2022-01-01 DIAGNOSIS — Z51.5 ENCOUNTER FOR ADMISSION TO HOSPICE CARE: ICD-10-CM

## 2022-01-01 DIAGNOSIS — G93.5: ICD-10-CM

## 2022-01-01 LAB
ALBUMIN SERPL-MCNC: 3.4 G/DL (ref 3.5–5)
ALBUMIN/GLOB SERPL: 1 {RATIO} (ref 1.1–2.2)
ALP SERPL-CCNC: 75 U/L (ref 45–117)
ALT SERPL-CCNC: 19 U/L (ref 12–78)
ANION GAP SERPL CALC-SCNC: 10 MMOL/L (ref 5–15)
AST SERPL-CCNC: 32 U/L (ref 15–37)
ATRIAL RATE: 90 BPM
BASOPHILS # BLD: 0 K/UL (ref 0–0.1)
BASOPHILS NFR BLD: 0 % (ref 0–1)
BILIRUB SERPL-MCNC: 0.5 MG/DL (ref 0.2–1)
BUN SERPL-MCNC: 19 MG/DL (ref 6–20)
BUN/CREAT SERPL: 17 (ref 12–20)
CALCIUM SERPL-MCNC: 8.9 MG/DL (ref 8.5–10.1)
CALCULATED P AXIS, ECG09: 56 DEGREES
CALCULATED R AXIS, ECG10: 2 DEGREES
CALCULATED T AXIS, ECG11: -4 DEGREES
CHLORIDE SERPL-SCNC: 106 MMOL/L (ref 97–108)
CO2 SERPL-SCNC: 23 MMOL/L (ref 21–32)
COMMENT, HOLDF: NORMAL
CREAT SERPL-MCNC: 1.1 MG/DL (ref 0.7–1.3)
DIAGNOSIS, 93000: NORMAL
DIFFERENTIAL METHOD BLD: ABNORMAL
EOSINOPHIL # BLD: 0.3 K/UL (ref 0–0.4)
EOSINOPHIL NFR BLD: 1 % (ref 0–7)
ERYTHROCYTE [DISTWIDTH] IN BLOOD BY AUTOMATED COUNT: 17.2 % (ref 11.5–14.5)
GLOBULIN SER CALC-MCNC: 3.5 G/DL (ref 2–4)
GLUCOSE BLD STRIP.AUTO-MCNC: 121 MG/DL (ref 65–117)
GLUCOSE SERPL-MCNC: 119 MG/DL (ref 65–100)
HCT VFR BLD AUTO: 32.8 % (ref 36.6–50.3)
HGB BLD-MCNC: 9.4 G/DL (ref 12.1–17)
IMM GRANULOCYTES # BLD AUTO: 0 K/UL (ref 0–0.04)
IMM GRANULOCYTES NFR BLD AUTO: 0 % (ref 0–0.5)
INR PPP: 1 (ref 0.9–1.1)
LYMPHOCYTES # BLD: 2.6 K/UL (ref 0.8–3.5)
LYMPHOCYTES NFR BLD: 9 % (ref 12–49)
MCH RBC QN AUTO: 26.8 PG (ref 26–34)
MCHC RBC AUTO-ENTMCNC: 28.7 G/DL (ref 30–36.5)
MCV RBC AUTO: 93.4 FL (ref 80–99)
METAMYELOCYTES NFR BLD MANUAL: 2 %
MONOCYTES # BLD: 5.2 K/UL (ref 0–1)
MONOCYTES NFR BLD: 18 % (ref 5–13)
MYELOCYTES NFR BLD MANUAL: 4 %
NEUTS BAND NFR BLD MANUAL: 4 %
NEUTS SEG # BLD: 16.3 K/UL (ref 1.8–8)
NEUTS SEG NFR BLD: 52 % (ref 32–75)
NRBC # BLD: 0.18 K/UL (ref 0–0.01)
NRBC BLD-RTO: 0.6 PER 100 WBC
OTHER CELLS NFR BLD MANUAL: 4 %
P-R INTERVAL, ECG05: 166 MS
PATH REV BLD -IMP: NORMAL
PLATELET # BLD AUTO: 37 K/UL (ref 150–400)
POTASSIUM SERPL-SCNC: 3.8 MMOL/L (ref 3.5–5.1)
PROMYELOCYTES NFR BLD MANUAL: 6 %
PROT SERPL-MCNC: 6.9 G/DL (ref 6.4–8.2)
PROTHROMBIN TIME: 10.9 SEC (ref 9–11.1)
Q-T INTERVAL, ECG07: 402 MS
QRS DURATION, ECG06: 116 MS
QTC CALCULATION (BEZET), ECG08: 491 MS
RBC # BLD AUTO: 3.51 M/UL (ref 4.1–5.7)
RBC MORPH BLD: ABNORMAL
SAMPLES BEING HELD,HOLD: NORMAL
SARS-COV-2, NAA: NEGATIVE
SERVICE CMNT-IMP: ABNORMAL
SODIUM SERPL-SCNC: 139 MMOL/L (ref 136–145)
VENTRICULAR RATE, ECG03: 90 BPM
WBC # BLD AUTO: 29.1 K/UL (ref 4.1–11.1)
WBC MORPH BLD: ABNORMAL

## 2022-01-01 PROCEDURE — 36415 COLL VENOUS BLD VENIPUNCTURE: CPT

## 2022-01-01 PROCEDURE — 74011000258 HC RX REV CODE- 258: Performed by: EMERGENCY MEDICINE

## 2022-01-01 PROCEDURE — 85610 PROTHROMBIN TIME: CPT

## 2022-01-01 PROCEDURE — 74011250636 HC RX REV CODE- 250/636: Performed by: EMERGENCY MEDICINE

## 2022-01-01 PROCEDURE — 96374 THER/PROPH/DIAG INJ IV PUSH: CPT

## 2022-01-01 PROCEDURE — 0656 HSPC GENERAL INPATIENT

## 2022-01-01 PROCEDURE — 70450 CT HEAD/BRAIN W/O DYE: CPT

## 2022-01-01 PROCEDURE — 94761 N-INVAS EAR/PLS OXIMETRY MLT: CPT

## 2022-01-01 PROCEDURE — 85025 COMPLETE CBC W/AUTO DIFF WBC: CPT

## 2022-01-01 PROCEDURE — 96365 THER/PROPH/DIAG IV INF INIT: CPT

## 2022-01-01 PROCEDURE — 96376 TX/PRO/DX INJ SAME DRUG ADON: CPT

## 2022-01-01 PROCEDURE — 74011250636 HC RX REV CODE- 250/636: Performed by: FAMILY MEDICINE

## 2022-01-01 PROCEDURE — 99285 EMERGENCY DEPT VISIT HI MDM: CPT

## 2022-01-01 PROCEDURE — 65270000029 HC RM PRIVATE

## 2022-01-01 PROCEDURE — 94762 N-INVAS EAR/PLS OXIMTRY CONT: CPT

## 2022-01-01 PROCEDURE — 74011000250 HC RX REV CODE- 250: Performed by: EMERGENCY MEDICINE

## 2022-01-01 PROCEDURE — 99223 1ST HOSP IP/OBS HIGH 75: CPT | Performed by: FAMILY MEDICINE

## 2022-01-01 PROCEDURE — 74011000250 HC RX REV CODE- 250: Performed by: FAMILY MEDICINE

## 2022-01-01 PROCEDURE — 80053 COMPREHEN METABOLIC PANEL: CPT

## 2022-01-01 PROCEDURE — 96366 THER/PROPH/DIAG IV INF ADDON: CPT

## 2022-01-01 PROCEDURE — 96375 TX/PRO/DX INJ NEW DRUG ADDON: CPT

## 2022-01-01 PROCEDURE — 93005 ELECTROCARDIOGRAM TRACING: CPT

## 2022-01-01 PROCEDURE — 82962 GLUCOSE BLOOD TEST: CPT

## 2022-01-01 RX ORDER — MIDAZOLAM HYDROCHLORIDE 1 MG/ML
2 INJECTION, SOLUTION INTRAMUSCULAR; INTRAVENOUS ONCE
Status: COMPLETED | OUTPATIENT
Start: 2022-01-01 | End: 2022-01-01

## 2022-01-01 RX ORDER — GLYCOPYRROLATE 0.2 MG/ML
0.2 INJECTION INTRAMUSCULAR; INTRAVENOUS EVERY 6 HOURS
Status: DISCONTINUED | OUTPATIENT
Start: 2022-01-01 | End: 2022-10-29 | Stop reason: HOSPADM

## 2022-01-01 RX ORDER — FACIAL-BODY WIPES
10 EACH TOPICAL DAILY PRN
Status: DISCONTINUED | OUTPATIENT
Start: 2022-01-01 | End: 2022-10-29 | Stop reason: HOSPADM

## 2022-01-01 RX ORDER — MIDAZOLAM HYDROCHLORIDE 1 MG/ML
5 INJECTION, SOLUTION INTRAMUSCULAR; INTRAVENOUS
Status: DISCONTINUED | OUTPATIENT
Start: 2022-01-01 | End: 2022-01-01

## 2022-01-01 RX ORDER — MORPHINE SULFATE 4 MG/ML
4 INJECTION INTRAVENOUS
Status: COMPLETED | OUTPATIENT
Start: 2022-01-01 | End: 2022-01-01

## 2022-01-01 RX ORDER — MORPHINE SULFATE 2 MG/ML
2 INJECTION, SOLUTION INTRAMUSCULAR; INTRAVENOUS
Status: DISCONTINUED | OUTPATIENT
Start: 2022-01-01 | End: 2022-01-01

## 2022-01-01 RX ORDER — GLYCOPYRROLATE 0.2 MG/ML
0.2 INJECTION INTRAMUSCULAR; INTRAVENOUS
Status: COMPLETED | OUTPATIENT
Start: 2022-01-01 | End: 2022-01-01

## 2022-01-01 RX ORDER — KETOROLAC TROMETHAMINE 30 MG/ML
30 INJECTION, SOLUTION INTRAMUSCULAR; INTRAVENOUS
Status: DISCONTINUED | OUTPATIENT
Start: 2022-01-01 | End: 2022-10-29 | Stop reason: HOSPADM

## 2022-01-01 RX ORDER — HYDROMORPHONE HYDROCHLORIDE 1 MG/ML
1 INJECTION, SOLUTION INTRAMUSCULAR; INTRAVENOUS; SUBCUTANEOUS
Status: DISCONTINUED | OUTPATIENT
Start: 2022-01-01 | End: 2022-10-29 | Stop reason: HOSPADM

## 2022-01-01 RX ORDER — MIDAZOLAM HYDROCHLORIDE 1 MG/ML
5 INJECTION, SOLUTION INTRAMUSCULAR; INTRAVENOUS
Status: DISCONTINUED | OUTPATIENT
Start: 2022-01-01 | End: 2022-10-29 | Stop reason: HOSPADM

## 2022-01-01 RX ORDER — MIDAZOLAM HYDROCHLORIDE 1 MG/ML
2 INJECTION, SOLUTION INTRAMUSCULAR; INTRAVENOUS
Status: COMPLETED | OUTPATIENT
Start: 2022-01-01 | End: 2022-01-01

## 2022-01-01 RX ORDER — HYDROMORPHONE HYDROCHLORIDE 1 MG/ML
1 INJECTION, SOLUTION INTRAMUSCULAR; INTRAVENOUS; SUBCUTANEOUS
Status: DISCONTINUED | OUTPATIENT
Start: 2022-01-01 | End: 2022-01-01

## 2022-01-01 RX ORDER — ONDANSETRON 2 MG/ML
4 INJECTION INTRAMUSCULAR; INTRAVENOUS ONCE
Status: COMPLETED | OUTPATIENT
Start: 2022-01-01 | End: 2022-01-01

## 2022-01-01 RX ORDER — MIDAZOLAM HYDROCHLORIDE 1 MG/ML
1 INJECTION, SOLUTION INTRAMUSCULAR; INTRAVENOUS
Status: DISCONTINUED | OUTPATIENT
Start: 2022-01-01 | End: 2022-01-01

## 2022-01-01 RX ORDER — LEVETIRACETAM 500 MG/5ML
2000 INJECTION, SOLUTION, CONCENTRATE INTRAVENOUS ONCE
Status: COMPLETED | OUTPATIENT
Start: 2022-01-01 | End: 2022-01-01

## 2022-01-01 RX ORDER — GLYCOPYRROLATE 0.2 MG/ML
0.2 INJECTION INTRAMUSCULAR; INTRAVENOUS
Status: DISCONTINUED | OUTPATIENT
Start: 2022-01-01 | End: 2022-10-29 | Stop reason: HOSPADM

## 2022-01-01 RX ADMIN — HYDROMORPHONE HYDROCHLORIDE 1 MG: 1 INJECTION, SOLUTION INTRAMUSCULAR; INTRAVENOUS; SUBCUTANEOUS at 22:03

## 2022-01-01 RX ADMIN — LEVETIRACETAM 2000 MG: 100 INJECTION INTRAVENOUS at 12:42

## 2022-01-01 RX ADMIN — MIDAZOLAM 5 MG: 1 INJECTION INTRAMUSCULAR; INTRAVENOUS at 16:03

## 2022-01-01 RX ADMIN — MIDAZOLAM 2 MG: 1 INJECTION, SOLUTION INTRAMUSCULAR; INTRAVENOUS at 14:25

## 2022-01-01 RX ADMIN — MIDAZOLAM 5 MG: 1 INJECTION INTRAMUSCULAR; INTRAVENOUS at 22:04

## 2022-01-01 RX ADMIN — MIDAZOLAM 2 MG: 1 INJECTION, SOLUTION INTRAMUSCULAR; INTRAVENOUS at 15:18

## 2022-01-01 RX ADMIN — SODIUM CHLORIDE 5 MG/HR: 9 INJECTION, SOLUTION INTRAVENOUS at 11:20

## 2022-01-01 RX ADMIN — ONDANSETRON 4 MG: 2 INJECTION INTRAMUSCULAR; INTRAVENOUS at 11:21

## 2022-01-01 RX ADMIN — MIDAZOLAM HYDROCHLORIDE 2 MG: 1 INJECTION, SOLUTION INTRAMUSCULAR; INTRAVENOUS at 13:12

## 2022-01-01 RX ADMIN — GLYCOPYRROLATE 0.2 MG: 0.2 INJECTION INTRAMUSCULAR; INTRAVENOUS at 18:30

## 2022-01-01 RX ADMIN — HYDROMORPHONE HYDROCHLORIDE 1 MG: 1 INJECTION, SOLUTION INTRAMUSCULAR; INTRAVENOUS; SUBCUTANEOUS at 17:13

## 2022-01-01 RX ADMIN — MIDAZOLAM 5 MG: 1 INJECTION INTRAMUSCULAR; INTRAVENOUS at 17:13

## 2022-01-01 RX ADMIN — MORPHINE SULFATE 2 MG: 2 INJECTION, SOLUTION INTRAMUSCULAR; INTRAVENOUS at 14:25

## 2022-01-01 RX ADMIN — SODIUM CHLORIDE 1000 ML: 9 INJECTION, SOLUTION INTRAVENOUS at 12:42

## 2022-01-01 RX ADMIN — HYDROMORPHONE HYDROCHLORIDE 1 MG: 1 INJECTION, SOLUTION INTRAMUSCULAR; INTRAVENOUS; SUBCUTANEOUS at 16:04

## 2022-01-01 RX ADMIN — GLYCOPYRROLATE 0.2 MG: 0.2 INJECTION INTRAMUSCULAR; INTRAVENOUS at 13:12

## 2022-01-01 RX ADMIN — SODIUM CHLORIDE 7.5 MG/HR: 9 INJECTION, SOLUTION INTRAVENOUS at 11:30

## 2022-01-01 RX ADMIN — SODIUM CHLORIDE 5 MG/HR: 900 INJECTION, SOLUTION INTRAVENOUS at 12:32

## 2022-01-01 RX ADMIN — MIDAZOLAM 2 MG: 1 INJECTION, SOLUTION INTRAMUSCULAR; INTRAVENOUS at 14:50

## 2022-01-01 RX ADMIN — MORPHINE SULFATE 4 MG: 4 INJECTION INTRAVENOUS at 12:42

## 2022-02-17 ENCOUNTER — OFFICE VISIT (OUTPATIENT)
Dept: INTERNAL MEDICINE CLINIC | Age: 82
End: 2022-02-17
Payer: MEDICARE

## 2022-02-17 VITALS
DIASTOLIC BLOOD PRESSURE: 71 MMHG | HEIGHT: 74 IN | SYSTOLIC BLOOD PRESSURE: 120 MMHG | RESPIRATION RATE: 14 BRPM | TEMPERATURE: 97.4 F | WEIGHT: 220 LBS | BODY MASS INDEX: 28.23 KG/M2 | OXYGEN SATURATION: 94 % | HEART RATE: 88 BPM

## 2022-02-17 DIAGNOSIS — I10 PRIMARY HYPERTENSION: ICD-10-CM

## 2022-02-17 DIAGNOSIS — G54.6 PHANTOM PAIN (HCC): ICD-10-CM

## 2022-02-17 DIAGNOSIS — D69.6 THROMBOCYTOPENIA (HCC): ICD-10-CM

## 2022-02-17 DIAGNOSIS — N39.0 RECURRENT UTI: ICD-10-CM

## 2022-02-17 DIAGNOSIS — L30.9 DERMATITIS: ICD-10-CM

## 2022-02-17 DIAGNOSIS — Z00.00 MEDICARE ANNUAL WELLNESS VISIT, SUBSEQUENT: Primary | ICD-10-CM

## 2022-02-17 DIAGNOSIS — E78.00 HYPERCHOLESTEROLEMIA: ICD-10-CM

## 2022-02-17 DIAGNOSIS — T14.8XXA BRUISING: ICD-10-CM

## 2022-02-17 DIAGNOSIS — R09.89 DECREASED PEDAL PULSES: ICD-10-CM

## 2022-02-17 DIAGNOSIS — I25.10 CORONARY ARTERY DISEASE INVOLVING NATIVE CORONARY ARTERY OF NATIVE HEART WITHOUT ANGINA PECTORIS: ICD-10-CM

## 2022-02-17 DIAGNOSIS — R23.3 PETECHIAE: ICD-10-CM

## 2022-02-17 DIAGNOSIS — G14 POST-POLIO SYNDROME: ICD-10-CM

## 2022-02-17 PROCEDURE — G8752 SYS BP LESS 140: HCPCS | Performed by: INTERNAL MEDICINE

## 2022-02-17 PROCEDURE — G8510 SCR DEP NEG, NO PLAN REQD: HCPCS | Performed by: INTERNAL MEDICINE

## 2022-02-17 PROCEDURE — 99214 OFFICE O/P EST MOD 30 MIN: CPT | Performed by: INTERNAL MEDICINE

## 2022-02-17 PROCEDURE — G8419 CALC BMI OUT NRM PARAM NOF/U: HCPCS | Performed by: INTERNAL MEDICINE

## 2022-02-17 PROCEDURE — G8536 NO DOC ELDER MAL SCRN: HCPCS | Performed by: INTERNAL MEDICINE

## 2022-02-17 PROCEDURE — G8427 DOCREV CUR MEDS BY ELIG CLIN: HCPCS | Performed by: INTERNAL MEDICINE

## 2022-02-17 PROCEDURE — G0463 HOSPITAL OUTPT CLINIC VISIT: HCPCS | Performed by: INTERNAL MEDICINE

## 2022-02-17 PROCEDURE — 1101F PT FALLS ASSESS-DOCD LE1/YR: CPT | Performed by: INTERNAL MEDICINE

## 2022-02-17 PROCEDURE — G8754 DIAS BP LESS 90: HCPCS | Performed by: INTERNAL MEDICINE

## 2022-02-17 PROCEDURE — G0439 PPPS, SUBSEQ VISIT: HCPCS | Performed by: INTERNAL MEDICINE

## 2022-02-17 RX ORDER — TRIAMCINOLONE ACETONIDE 1 MG/G
CREAM TOPICAL
Qty: 30 G | Refills: 5 | Status: SHIPPED | OUTPATIENT
Start: 2022-02-17 | End: 2022-02-17

## 2022-02-17 RX ORDER — ACETAMINOPHEN AND CODEINE PHOSPHATE 300; 30 MG/1; MG/1
1 TABLET ORAL
Qty: 60 TABLET | Refills: 0 | Status: SHIPPED | OUTPATIENT
Start: 2022-02-17 | End: 2022-10-21 | Stop reason: SDUPTHER

## 2022-02-17 RX ORDER — TRIAMCINOLONE ACETONIDE 1 MG/G
CREAM TOPICAL
Qty: 453 G | Refills: 5 | Status: SHIPPED | OUTPATIENT
Start: 2022-02-17

## 2022-02-17 NOTE — PROGRESS NOTES
This is a Subsequent Medicare Annual Wellness Visit providing Personalized Prevention Plan Services (PPPS) (Performed 12 months after initial AWV and PPPS )    I have reviewed the patient's medical history in detail and updated the computerized patient record. He is brought in here today by [id number] [address] [place] [facility]. He is in a powered wheelchair. Reports rash on his bilateral lower extremities. He does have symptoms lots of itching and scaling for which she has been trying Vaseline with only mild improvement. Did have steroid cream triamcinolone in the past which she says has helped with these lesions. In addition, he has some darker spots near his feet and ankles which are not itching and painful and are not scaly. Denies any recent injuries. History of chronic thrombocytopenia. Is not taking aspirin or any other blood thinners. Has not had any significant work-up for this recently. On review of records, platelet count was in the 50s in 2019, steadily increased to 150 in January 2021, and then was in the 70s April 2021. Lab Results   Component Value Date/Time    WBC 5.6 04/29/2021 03:34 AM    HGB 10.6 (L) 04/29/2021 03:34 AM    HCT 34.9 (L) 04/29/2021 03:34 AM    PLATELET 76 (L) 96/61/1870 03:34 AM    MCV 93.6 04/29/2021 03:34 AM       Hypertension  Hypertension ROS: taking medications as instructed, no medication side effects noted, no TIA's, no chest pain on exertion, no dyspnea on exertion, no swelling of ankles     reports that he has never smoked. He has never used smokeless tobacco.    reports no history of alcohol use. BP Readings from Last 2 Encounters:   02/17/22 120/71   05/03/21 (!) 145/79     Recurrent UTI. Testing tat home and taking cipro prn. Last filled 10/20/21.   Taking flomax    Hyperlipidemia  Currently he takes simvastatin mg  ROS: taking medications as instructed, no medication side effects noted  No new myalgias, no joint pains, no weakness  No TIA's, no chest pain on exertion, no dyspnea on exertion, no swelling of ankles. Lab Results   Component Value Date/Time    Cholesterol, total 137 07/24/2017 11:58 AM    HDL Cholesterol 48 07/24/2017 11:58 AM    LDL, calculated 69 07/24/2017 11:58 AM    VLDL, calculated 20 07/24/2017 11:58 AM    Triglyceride 98 07/24/2017 11:58 AM               History     Past Medical History:   Diagnosis Date    Abnormal CT of the chest     CT 5/3/21: chronic worsning partial consolidation of the left lower lobe     BPH (benign prostatic hyperplasia)     saw urology in [place] CAD in native artery     s/p stent x2. saw Dr. Sidney Son [place]    DNR (do not resuscitate) 4/17/2018    HTN (hypertension)     Hx of diverticulitis of colon     Hypercholesterolemia     Melanoma ([facility] [address]     x4. one present on L shoulder stump. 2013. superficial per pt    Phantom pain ([facility] [address]     L arm    Polycystic kidney disease     Post-polio syndrome age 4    dx 2004 in [place]. He still drives. weakness, R deltoid weakness, s/p L arm amputation, R hip pain, R leg-ankle brace    Renal cyst     8 cm, increasing 20 years    Right hip pain     chronic, polio    Thrombocytopenia (HCC)     chronic. clumping seen 1/2021. 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  HX CORONARY STENT PLACEMENT  2006    HX CORONARY STENT PLACEMENT  2004?  HX HERNIA REPAIR Left     HX TONSILLECTOMY      HX VEIN STRIPPING         Current Outpatient Medications   Medication Sig    simvastatin (ZOCOR) 40 mg tablet TAKE 1 TABLET BY MOUTH EVERYDAY AT BEDTIME    tamsulosin (FLOMAX) 0.4 mg capsule Take 2 Capsules by mouth daily.  acetaminophen (TYLENOL) 325 mg tablet Take 650 mg by mouth two (2) times daily as needed for Pain (Mild pain).  polyvinyl alcohol (LIQUIFILM TEARS) 1.4 % ophthalmic solution Administer 1 Drop to both eyes as needed (Dry eyes).  metoprolol succinate (TOPROL-XL) 25 mg XL tablet Take 12.5 mg by mouth daily.  cyanocobalamin (Vitamin B-12) 1,000 mcg tablet Take 1,000 mcg by mouth daily.  cholecalciferol (Vitamin D3) (1000 Units /25 mcg) tablet Take 1,000 Units by mouth daily.  ascorbic acid, vitamin C, (VITAMIN C) 1,000 mg tablet Take 1,000 mg by mouth daily.  therapeutic multivitamin (THERAGRAN) tablet Take 1 Tab by mouth daily.  triamcinolone acetonide (KENALOG) 0.1 % topical cream Apply  to affected area two (2) times daily as needed for Skin Irritation. use thin layer (Patient not taking: Reported on 2/17/2022)     No current facility-administered medications for this visit. No Known Allergies    Family History   Problem Relation Age of Onset    Diabetes Mother         reports that he has never smoked. He has never used smokeless tobacco.   reports no history of alcohol use. Depression Risk Factor Screening:       Alcohol Risk Factor Screening: On any occasion during the past 3 months, have you had more than 3 drinks containing alcohol? No    Do you average more than 14 drinks per week? No      Functional Ability and Level of Safety:     Hearing Loss   mild    Activities of Daily Living   Self-care. Requires assistance with: no ADLs    Fall Risk     Fall Risk Assessment, last 12 mths 2/17/2022   Able to walk? No   Fall in past 12 months? -   Do you feel unsteady? -   Are you worried about falling -   Is the gait abnormal? -   Number of falls in past 12 months -   Fall with injury? -         Abuse Screen   Patient is not abused    Review of Systems   A comprehensive review of systems was negative except for that written in the HPI.     Physical Examination     Evaluation of Cognitive Function:  Mood/affect:  neutral, happy  Appearance: age appropriate  Family member/caregiver input: none    Blood pressure 120/71, pulse 88, temperature 97.4 °F (36.3 °C), temperature source Oral, resp. rate 14, height 6' 2\" (1.88 m), weight 220 lb (99.8 kg), SpO2 94 %. General appearance: alert, cooperative, no distress, appears stated age  Neck: supple, symmetrical, trachea midline, no adenopathy, thyroid: not enlarged, symmetric, no tenderness/mass/nodules, no carotid bruit and no JVD  Lungs: clear to auscultation bilaterally  Heart: regular rate and rhythm, S1, S2 normal, no murmur, click, rub or gallop  Extremities: Bilateral lower extremities with patchy scaly lesions of lower legs. There are some regions of petechiae and ecchymosis, largely on right medial ankle, bilateral feet and toes. Hyperemia of bilateral toes and mildly slow cap refill. He does have a faintly palpable dorsalis pedis pulse bilaterally. Mild edema. Patient Care Team:  Bobo Keys MD as PCP - General (Internal Medicine)  Bobo Keys MD as PCP [facility]      Advice/Referrals/Counseling   Education and counseling provided. See below for specific orders    Assessment/Plan   Diagnoses and all orders for this visit:    1. Medicare annual wellness visit, subsequent  He is up-to-date on preventative services. His wife Rian Lopez is his primary medical decision-maker. ACP updated. 2. Dermatitis  He does have scaly lesions consistent with eczema. Will refill Kenalog cream to use as needed. -     triamcinolone acetonide (KENALOG) 0.1 % topical cream; Apply  to affected area two (2) times daily as needed for Skin Irritation. use thin layer. PLEASE FILL 453 g JAR    3. Thrombocytopenia ([facility] [address]  Unclear etiology. May need to consider consultation with hematology, especially if below 50. He does have some degree of platelet company which may be playing a role  I did not order a sodium citrate tube but could consider rechecking with that. -     CBC WITH AUTOMATED DIFF; Future  -     FERRITIN; Future    4. Petechiae  7. Bruising  Also noted to have significant petechiae and bruising. Suspect this is secondary to thrombocytopenia. Not taking aspirin or any other anticoagulants. Should try to avoid NSAIDs as well which he is taking. May need to consider consultation with hematology. 5. Decreased pedal pulses  He does have some decreased pedal pulses and some hyperemia. I think getting a vascular evaluation would be reasonable. -     REFERRAL TO VASCULAR SURGERY    6. Recurrent UTI  Currently asymptomatic. Taking Cipro sparingly when he has symptoms and does a home test.  Was admitted for severe sepsis and I think as needed use of antibiotics at home is a smart idea for him. 8. Primary hypertension  This condition is controlled on current medication regimen as written in medication list.  Medications refilled. -     METABOLIC PANEL, COMPREHENSIVE; Future    9. Coronary artery disease involving native coronary artery of native heart without angina pectoris  Currently asymptomatic. Follow-up with cardiology. 10. Hypercholesterolemia  Likely resume well-controlled on simvastatin 40 mg daily. We will continue.  -     LIPID PANEL; Future  -     METABOLIC PANEL, COMPREHENSIVE; Future    11. Phantom pain ([address]  Relatively stable phantom pain symptoms. Taking Tylenol 3 very sparingly.  reviewed. No suspicious activities. Try to avoid NSAIDs because of bruising. Tylenol is a better option for him. -     acetaminophen-codeine (TYLENOL #3) 300-30 mg per tablet; Take 1 Tablet by mouth every six (6) hours as needed for Pain (Severe pain) for up to 30 days. Max Daily Amount: 4 Tablets. 12. Post-polio syndrome  He is wheelchair bound for the most part. -     acetaminophen-codeine (TYLENOL #3) 300-30 mg per tablet; Take 1 Tablet by mouth every six (6) hours as needed for Pain (Severe pain) for up to 30 days. Max Daily Amount: 4 Tablets.     .    Potential medication side effects were discussed with the patient; let me know if any occur.   Return for yearly Annual Wellness Visits

## 2022-02-18 LAB
ALBUMIN SERPL-MCNC: 3.7 G/DL (ref 3.5–5)
ALBUMIN/GLOB SERPL: 1.3 {RATIO} (ref 1.1–2.2)
ALP SERPL-CCNC: 91 U/L (ref 45–117)
ALT SERPL-CCNC: 22 U/L (ref 12–78)
ANION GAP SERPL CALC-SCNC: 8 MMOL/L (ref 5–15)
AST SERPL-CCNC: 16 U/L (ref 15–37)
BASOPHILS # BLD: 0 K/UL (ref 0–0.1)
BASOPHILS NFR BLD: 0 % (ref 0–1)
BILIRUB SERPL-MCNC: 0.4 MG/DL (ref 0.2–1)
BUN SERPL-MCNC: 20 MG/DL (ref 6–20)
BUN/CREAT SERPL: 24 (ref 12–20)
CALCIUM SERPL-MCNC: 9 MG/DL (ref 8.5–10.1)
CHLORIDE SERPL-SCNC: 110 MMOL/L (ref 97–108)
CHOLEST SERPL-MCNC: 102 MG/DL
CO2 SERPL-SCNC: 22 MMOL/L (ref 21–32)
CREAT SERPL-MCNC: 0.82 MG/DL (ref 0.7–1.3)
DIFFERENTIAL METHOD BLD: ABNORMAL
EOSINOPHIL # BLD: 0 K/UL (ref 0–0.4)
EOSINOPHIL NFR BLD: 0 % (ref 0–7)
ERYTHROCYTE [DISTWIDTH] IN BLOOD BY AUTOMATED COUNT: 15.6 % (ref 11.5–14.5)
FERRITIN SERPL-MCNC: 170 NG/ML (ref 26–388)
GLOBULIN SER CALC-MCNC: 2.8 G/DL (ref 2–4)
GLUCOSE SERPL-MCNC: 69 MG/DL (ref 65–100)
HCT VFR BLD AUTO: 39 % (ref 36.6–50.3)
HDLC SERPL-MCNC: 39 MG/DL
HDLC SERPL: 2.6 {RATIO} (ref 0–5)
HGB BLD-MCNC: 11.4 G/DL (ref 12.1–17)
IMM GRANULOCYTES # BLD AUTO: 0 K/UL (ref 0–0.04)
IMM GRANULOCYTES NFR BLD AUTO: 0 % (ref 0–0.5)
LDLC SERPL CALC-MCNC: 41.6 MG/DL (ref 0–100)
LYMPHOCYTES # BLD: 0.7 K/UL (ref 0.8–3.5)
LYMPHOCYTES NFR BLD: 17 % (ref 12–49)
MCH RBC QN AUTO: 26.5 PG (ref 26–34)
MCHC RBC AUTO-ENTMCNC: 29.2 G/DL (ref 30–36.5)
MCV RBC AUTO: 90.5 FL (ref 80–99)
MONOCYTES # BLD: 0.2 K/UL (ref 0–1)
MONOCYTES NFR BLD: 5 % (ref 5–13)
NEUTS SEG # BLD: 3.4 K/UL (ref 1.8–8)
NEUTS SEG NFR BLD: 78 % (ref 32–75)
NRBC # BLD: 0 K/UL (ref 0–0.01)
NRBC BLD-RTO: 0 PER 100 WBC
PLATELET # BLD AUTO: 76 K/UL (ref 150–400)
POTASSIUM SERPL-SCNC: 4.2 MMOL/L (ref 3.5–5.1)
PROT SERPL-MCNC: 6.5 G/DL (ref 6.4–8.2)
RBC # BLD AUTO: 4.31 M/UL (ref 4.1–5.7)
RBC MORPH BLD: ABNORMAL
SODIUM SERPL-SCNC: 140 MMOL/L (ref 136–145)
TRIGL SERPL-MCNC: 107 MG/DL (ref ?–150)
VLDLC SERPL CALC-MCNC: 21.4 MG/DL
WBC # BLD AUTO: 4.3 K/UL (ref 4.1–11.1)
WBC MORPH BLD: ABNORMAL

## 2022-03-18 PROBLEM — N39.0 SEPSIS DUE TO URINARY TRACT INFECTION (HCC): Status: ACTIVE | Noted: 2021-04-26

## 2022-03-18 PROBLEM — B34.8 PARAINFLUENZA INFECTION: Status: ACTIVE | Noted: 2019-10-27

## 2022-03-18 PROBLEM — Z86.69 HISTORY OF RETINAL DETACHMENT: Status: ACTIVE | Noted: 2017-08-28

## 2022-03-18 PROBLEM — A41.9 SEPSIS DUE TO URINARY TRACT INFECTION (HCC): Status: ACTIVE | Noted: 2021-04-26

## 2022-03-19 PROBLEM — H40.013 OPEN ANGLE WITH BORDERLINE FINDINGS OF BOTH EYES: Status: ACTIVE | Noted: 2017-08-28

## 2022-03-19 PROBLEM — H55.00 NYSTAGMUS: Status: ACTIVE | Noted: 2017-08-28

## 2022-03-19 PROBLEM — N45.2 ORCHITIS OF RIGHT TESTICLE: Status: ACTIVE | Noted: 2021-01-24

## 2022-03-19 PROBLEM — D69.6 THROMBOCYTOPENIA (HCC): Status: ACTIVE | Noted: 2019-10-26

## 2022-03-19 PROBLEM — J18.9 PNEUMONIA INVOLVING LEFT LUNG: Status: ACTIVE | Noted: 2021-04-26

## 2022-03-19 PROBLEM — N39.0 UTI (URINARY TRACT INFECTION): Status: ACTIVE | Noted: 2019-03-16

## 2022-03-20 PROBLEM — Z71.89 ADVANCED CARE PLANNING/COUNSELING DISCUSSION: Status: ACTIVE | Noted: 2017-07-25

## 2022-03-20 PROBLEM — Z66 DNR (DO NOT RESUSCITATE): Status: ACTIVE | Noted: 2018-04-17

## 2022-03-20 PROBLEM — I10 HTN (HYPERTENSION): Status: ACTIVE | Noted: 2019-10-25

## 2022-03-29 DIAGNOSIS — B96.89 UTI DUE TO KLEBSIELLA SPECIES: ICD-10-CM

## 2022-03-29 DIAGNOSIS — N39.0 UTI DUE TO KLEBSIELLA SPECIES: ICD-10-CM

## 2022-03-29 RX ORDER — CIPROFLOXACIN 500 MG/1
500 TABLET ORAL 2 TIMES DAILY
Qty: 20 TABLET | Refills: 5 | Status: SHIPPED | OUTPATIENT
Start: 2022-03-29 | End: 2022-04-08

## 2022-04-13 DIAGNOSIS — E78.00 HYPERCHOLESTEROLEMIA: ICD-10-CM

## 2022-04-13 RX ORDER — SIMVASTATIN 40 MG/1
TABLET, FILM COATED ORAL
Qty: 90 TABLET | Refills: 1 | Status: SHIPPED | OUTPATIENT
Start: 2022-04-13 | End: 2022-09-18

## 2022-05-20 DIAGNOSIS — N39.0 UTI DUE TO KLEBSIELLA SPECIES: ICD-10-CM

## 2022-05-20 DIAGNOSIS — B96.89 UTI DUE TO KLEBSIELLA SPECIES: ICD-10-CM

## 2022-06-18 ENCOUNTER — HOSPITAL ENCOUNTER (OUTPATIENT)
Age: 82
Setting detail: OBSERVATION
Discharge: HOME HEALTH | End: 2022-06-20
Attending: EMERGENCY MEDICINE | Admitting: INTERNAL MEDICINE
Payer: MEDICARE

## 2022-06-18 ENCOUNTER — APPOINTMENT (OUTPATIENT)
Dept: CT IMAGING | Age: 82
End: 2022-06-18
Attending: EMERGENCY MEDICINE
Payer: MEDICARE

## 2022-06-18 ENCOUNTER — APPOINTMENT (OUTPATIENT)
Dept: GENERAL RADIOLOGY | Age: 82
End: 2022-06-18
Attending: EMERGENCY MEDICINE
Payer: MEDICARE

## 2022-06-18 DIAGNOSIS — I25.119 CORONARY ARTERY DISEASE INVOLVING NATIVE CORONARY ARTERY OF NATIVE HEART WITH ANGINA PECTORIS (HCC): ICD-10-CM

## 2022-06-18 DIAGNOSIS — R07.9 ACUTE CHEST PAIN: Primary | ICD-10-CM

## 2022-06-18 DIAGNOSIS — I10 PRIMARY HYPERTENSION: ICD-10-CM

## 2022-06-18 LAB
ALBUMIN SERPL-MCNC: 3.6 G/DL (ref 3.5–5)
ALBUMIN/GLOB SERPL: 1.1 {RATIO} (ref 1.1–2.2)
ALP SERPL-CCNC: 87 U/L (ref 45–117)
ALT SERPL-CCNC: 25 U/L (ref 12–78)
ANION GAP SERPL CALC-SCNC: 9 MMOL/L (ref 5–15)
AST SERPL-CCNC: 24 U/L (ref 15–37)
BASOPHILS # BLD: 0 K/UL (ref 0–0.1)
BASOPHILS NFR BLD: 0 % (ref 0–1)
BILIRUB SERPL-MCNC: 0.6 MG/DL (ref 0.2–1)
BNP SERPL-MCNC: 119 PG/ML
BUN SERPL-MCNC: 21 MG/DL (ref 6–20)
BUN/CREAT SERPL: 22 (ref 12–20)
CALCIUM SERPL-MCNC: 8.6 MG/DL (ref 8.5–10.1)
CHLORIDE SERPL-SCNC: 108 MMOL/L (ref 97–108)
CO2 SERPL-SCNC: 21 MMOL/L (ref 21–32)
CREAT SERPL-MCNC: 0.96 MG/DL (ref 0.7–1.3)
D DIMER PPP FEU-MCNC: 1.6 MG/L FEU (ref 0–0.65)
DIFFERENTIAL METHOD BLD: ABNORMAL
EOSINOPHIL # BLD: 0.1 K/UL (ref 0–0.4)
EOSINOPHIL NFR BLD: 2 % (ref 0–7)
ERYTHROCYTE [DISTWIDTH] IN BLOOD BY AUTOMATED COUNT: 16 % (ref 11.5–14.5)
GLOBULIN SER CALC-MCNC: 3.2 G/DL (ref 2–4)
GLUCOSE SERPL-MCNC: 99 MG/DL (ref 65–100)
HCT VFR BLD AUTO: 37.8 % (ref 36.6–50.3)
HGB BLD-MCNC: 11.4 G/DL (ref 12.1–17)
IMM GRANULOCYTES # BLD AUTO: 0 K/UL
IMM GRANULOCYTES NFR BLD AUTO: 0 %
LYMPHOCYTES # BLD: 1.2 K/UL (ref 0.8–3.5)
LYMPHOCYTES NFR BLD: 25 % (ref 12–49)
MAGNESIUM SERPL-MCNC: 2.2 MG/DL (ref 1.6–2.4)
MCH RBC QN AUTO: 26.6 PG (ref 26–34)
MCHC RBC AUTO-ENTMCNC: 30.2 G/DL (ref 30–36.5)
MCV RBC AUTO: 88.3 FL (ref 80–99)
MONOCYTES # BLD: 0.4 K/UL (ref 0–1)
MONOCYTES NFR BLD: 9 % (ref 5–13)
NEUTS SEG # BLD: 3.2 K/UL (ref 1.8–8)
NEUTS SEG NFR BLD: 64 % (ref 32–75)
NRBC # BLD: 0 K/UL (ref 0–0.01)
NRBC BLD-RTO: 0 PER 100 WBC
PHOSPHATE SERPL-MCNC: 2.8 MG/DL (ref 2.6–4.7)
PLATELET # BLD AUTO: 81 K/UL (ref 150–400)
PMV BLD AUTO: 12 FL (ref 8.9–12.9)
POTASSIUM SERPL-SCNC: 4.5 MMOL/L (ref 3.5–5.1)
PROT SERPL-MCNC: 6.8 G/DL (ref 6.4–8.2)
RBC # BLD AUTO: 4.28 M/UL (ref 4.1–5.7)
RBC MORPH BLD: ABNORMAL
SODIUM SERPL-SCNC: 138 MMOL/L (ref 136–145)
TROPONIN-HIGH SENSITIVITY: 5 NG/L (ref 0–76)
WBC # BLD AUTO: 4.9 K/UL (ref 4.1–11.1)

## 2022-06-18 PROCEDURE — 36415 COLL VENOUS BLD VENIPUNCTURE: CPT

## 2022-06-18 PROCEDURE — 80053 COMPREHEN METABOLIC PANEL: CPT

## 2022-06-18 PROCEDURE — 84100 ASSAY OF PHOSPHORUS: CPT

## 2022-06-18 PROCEDURE — 85379 FIBRIN DEGRADATION QUANT: CPT

## 2022-06-18 PROCEDURE — 71275 CT ANGIOGRAPHY CHEST: CPT

## 2022-06-18 PROCEDURE — 93005 ELECTROCARDIOGRAM TRACING: CPT

## 2022-06-18 PROCEDURE — 84484 ASSAY OF TROPONIN QUANT: CPT

## 2022-06-18 PROCEDURE — 83880 ASSAY OF NATRIURETIC PEPTIDE: CPT

## 2022-06-18 PROCEDURE — 71045 X-RAY EXAM CHEST 1 VIEW: CPT

## 2022-06-18 PROCEDURE — 74011250637 HC RX REV CODE- 250/637: Performed by: EMERGENCY MEDICINE

## 2022-06-18 PROCEDURE — 83735 ASSAY OF MAGNESIUM: CPT

## 2022-06-18 PROCEDURE — 85025 COMPLETE CBC W/AUTO DIFF WBC: CPT

## 2022-06-18 PROCEDURE — 99285 EMERGENCY DEPT VISIT HI MDM: CPT

## 2022-06-18 RX ORDER — ASPIRIN 325 MG
325 TABLET ORAL ONCE
Status: COMPLETED | OUTPATIENT
Start: 2022-06-18 | End: 2022-06-18

## 2022-06-18 RX ADMIN — NITROGLYCERIN 1 INCH: 20 OINTMENT TOPICAL at 22:19

## 2022-06-18 RX ADMIN — ASPIRIN 325 MG: 325 TABLET ORAL at 22:20

## 2022-06-19 ENCOUNTER — APPOINTMENT (OUTPATIENT)
Dept: NON INVASIVE DIAGNOSTICS | Age: 82
End: 2022-06-19
Attending: INTERNAL MEDICINE
Payer: MEDICARE

## 2022-06-19 PROBLEM — D64.9 ANEMIA: Status: ACTIVE | Noted: 2022-01-01

## 2022-06-19 PROBLEM — J18.9 PNEUMONIA INVOLVING LEFT LUNG: Status: RESOLVED | Noted: 2021-04-26 | Resolved: 2022-01-01

## 2022-06-19 PROBLEM — H40.013 OPEN ANGLE WITH BORDERLINE FINDINGS OF BOTH EYES: Status: RESOLVED | Noted: 2017-08-28 | Resolved: 2022-01-01

## 2022-06-19 PROBLEM — Z71.89 ADVANCED CARE PLANNING/COUNSELING DISCUSSION: Status: RESOLVED | Noted: 2017-07-25 | Resolved: 2022-01-01

## 2022-06-19 PROBLEM — H55.00 NYSTAGMUS: Status: RESOLVED | Noted: 2017-08-28 | Resolved: 2022-01-01

## 2022-06-19 PROBLEM — N39.0 UTI (URINARY TRACT INFECTION): Status: RESOLVED | Noted: 2019-03-16 | Resolved: 2022-01-01

## 2022-06-19 PROBLEM — N39.0 UTI (URINARY TRACT INFECTION): Status: RESOLVED | Noted: 2019-03-16 | Resolved: 2022-06-19

## 2022-06-19 PROBLEM — N45.2 ORCHITIS OF RIGHT TESTICLE: Status: RESOLVED | Noted: 2021-01-24 | Resolved: 2022-01-01

## 2022-06-19 PROBLEM — A41.9 SEPSIS DUE TO URINARY TRACT INFECTION (HCC): Status: RESOLVED | Noted: 2021-04-26 | Resolved: 2022-06-19

## 2022-06-19 PROBLEM — B34.8 PARAINFLUENZA INFECTION: Status: RESOLVED | Noted: 2019-10-27 | Resolved: 2022-01-01

## 2022-06-19 PROBLEM — N39.0 SEPSIS DUE TO URINARY TRACT INFECTION (HCC): Status: RESOLVED | Noted: 2021-04-26 | Resolved: 2022-01-01

## 2022-06-19 PROBLEM — J18.9 PNEUMONIA INVOLVING LEFT LUNG: Status: RESOLVED | Noted: 2021-04-26 | Resolved: 2022-06-19

## 2022-06-19 PROBLEM — Z86.69 HISTORY OF RETINAL DETACHMENT: Status: RESOLVED | Noted: 2017-08-28 | Resolved: 2022-01-01

## 2022-06-19 PROBLEM — R07.9 CHEST PAIN: Status: ACTIVE | Noted: 2022-01-01

## 2022-06-19 PROBLEM — A41.9 SEPSIS DUE TO URINARY TRACT INFECTION (HCC): Status: RESOLVED | Noted: 2021-04-26 | Resolved: 2022-01-01

## 2022-06-19 LAB
ATRIAL RATE: 75 BPM
CALCULATED P AXIS, ECG09: 50 DEGREES
CALCULATED R AXIS, ECG10: 23 DEGREES
CALCULATED T AXIS, ECG11: 20 DEGREES
DIAGNOSIS, 93000: NORMAL
ECHO AV AREA PEAK VELOCITY: 3.2 CM2
ECHO AV AREA VTI: 3.2 CM2
ECHO AV AREA/BSA PEAK VELOCITY: 1.4 CM2/M2
ECHO AV AREA/BSA VTI: 1.4 CM2/M2
ECHO AV MEAN GRADIENT: 2 MMHG
ECHO AV MEAN VELOCITY: 0.7 M/S
ECHO AV PEAK GRADIENT: 5 MMHG
ECHO AV PEAK VELOCITY: 1.1 M/S
ECHO AV VELOCITY RATIO: 0.82
ECHO AV VTI: 18.5 CM
ECHO LA DIAMETER INDEX: 1.29 CM/M2
ECHO LA DIAMETER: 2.9 CM
ECHO LA VOL 4C: 27 ML (ref 18–58)
ECHO LA VOLUME INDEX A4C: 12 ML/M2 (ref 16–34)
ECHO LV E' LATERAL VELOCITY: 6 CM/S
ECHO LV E' SEPTAL VELOCITY: 5 CM/S
ECHO LV FRACTIONAL SHORTENING: 32 % (ref 28–44)
ECHO LV INTERNAL DIMENSION DIASTOLE INDEX: 2.23 CM/M2
ECHO LV INTERNAL DIMENSION DIASTOLIC: 5 CM (ref 4.2–5.9)
ECHO LV INTERNAL DIMENSION SYSTOLIC INDEX: 1.52 CM/M2
ECHO LV INTERNAL DIMENSION SYSTOLIC: 3.4 CM
ECHO LV IVSD: 1.2 CM (ref 0.6–1)
ECHO LV MASS 2D: 233.7 G (ref 88–224)
ECHO LV MASS INDEX 2D: 104.4 G/M2 (ref 49–115)
ECHO LV POSTERIOR WALL DIASTOLIC: 1.2 CM (ref 0.6–1)
ECHO LV RELATIVE WALL THICKNESS RATIO: 0.48
ECHO LVOT AREA: 3.8 CM2
ECHO LVOT AV VTI INDEX: 0.85
ECHO LVOT DIAM: 2.2 CM
ECHO LVOT MEAN GRADIENT: 2 MMHG
ECHO LVOT PEAK GRADIENT: 3 MMHG
ECHO LVOT PEAK VELOCITY: 0.9 M/S
ECHO LVOT STROKE VOLUME INDEX: 26.6 ML/M2
ECHO LVOT SV: 59.7 ML
ECHO LVOT VTI: 15.7 CM
ECHO MV A VELOCITY: 0.67 M/S
ECHO MV E DECELERATION TIME (DT): 234.6 MS
ECHO MV E VELOCITY: 0.64 M/S
ECHO MV E/A RATIO: 0.96
ECHO MV E/E' LATERAL: 10.67
ECHO MV E/E' RATIO (AVERAGED): 11.73
ECHO MV E/E' SEPTAL: 12.8
ECHO MV REGURGITANT PEAK GRADIENT: 125 MMHG
ECHO MV REGURGITANT PEAK VELOCITY: 5.6 M/S
ECHO PV MAX VELOCITY: 0.8 M/S
ECHO PV PEAK GRADIENT: 2 MMHG
ECHO RV INTERNAL DIMENSION: 5.5 CM
ECHO RVOT PEAK GRADIENT: 1 MMHG
ECHO RVOT PEAK VELOCITY: 0.6 M/S
ECHO TV REGURGITANT MAX VELOCITY: 1.66 M/S
ECHO TV REGURGITANT PEAK GRADIENT: 11 MMHG
P-R INTERVAL, ECG05: 192 MS
Q-T INTERVAL, ECG07: 430 MS
QRS DURATION, ECG06: 114 MS
QTC CALCULATION (BEZET), ECG08: 480 MS
TROPONIN-HIGH SENSITIVITY: 6 NG/L (ref 0–76)
VENTRICULAR RATE, ECG03: 75 BPM

## 2022-06-19 PROCEDURE — G0378 HOSPITAL OBSERVATION PER HR: HCPCS

## 2022-06-19 PROCEDURE — 93306 TTE W/DOPPLER COMPLETE: CPT

## 2022-06-19 PROCEDURE — 74011000636 HC RX REV CODE- 636: Performed by: RADIOLOGY

## 2022-06-19 PROCEDURE — 74011250636 HC RX REV CODE- 250/636: Performed by: INTERNAL MEDICINE

## 2022-06-19 PROCEDURE — 97161 PT EVAL LOW COMPLEX 20 MIN: CPT

## 2022-06-19 PROCEDURE — 99205 OFFICE O/P NEW HI 60 MIN: CPT | Performed by: INTERNAL MEDICINE

## 2022-06-19 PROCEDURE — 36415 COLL VENOUS BLD VENIPUNCTURE: CPT

## 2022-06-19 PROCEDURE — 74011250637 HC RX REV CODE- 250/637: Performed by: INTERNAL MEDICINE

## 2022-06-19 PROCEDURE — 84484 ASSAY OF TROPONIN QUANT: CPT

## 2022-06-19 PROCEDURE — 96372 THER/PROPH/DIAG INJ SC/IM: CPT

## 2022-06-19 PROCEDURE — 97116 GAIT TRAINING THERAPY: CPT

## 2022-06-19 PROCEDURE — 93306 TTE W/DOPPLER COMPLETE: CPT | Performed by: INTERNAL MEDICINE

## 2022-06-19 PROCEDURE — 71275 CT ANGIOGRAPHY CHEST: CPT

## 2022-06-19 RX ORDER — TAMSULOSIN HYDROCHLORIDE 0.4 MG/1
0.8 CAPSULE ORAL DAILY
Status: DISCONTINUED | OUTPATIENT
Start: 2022-06-19 | End: 2022-06-20 | Stop reason: HOSPADM

## 2022-06-19 RX ORDER — ONDANSETRON 4 MG/1
4 TABLET, ORALLY DISINTEGRATING ORAL
Status: DISCONTINUED | OUTPATIENT
Start: 2022-06-19 | End: 2022-06-20 | Stop reason: HOSPADM

## 2022-06-19 RX ORDER — POLYETHYLENE GLYCOL 3350 17 G/17G
17 POWDER, FOR SOLUTION ORAL DAILY PRN
Status: DISCONTINUED | OUTPATIENT
Start: 2022-06-19 | End: 2022-06-20 | Stop reason: HOSPADM

## 2022-06-19 RX ORDER — ACETAMINOPHEN 325 MG/1
650 TABLET ORAL
Status: DISCONTINUED | OUTPATIENT
Start: 2022-06-19 | End: 2022-06-20 | Stop reason: HOSPADM

## 2022-06-19 RX ORDER — ENOXAPARIN SODIUM 100 MG/ML
40 INJECTION SUBCUTANEOUS DAILY
Status: DISCONTINUED | OUTPATIENT
Start: 2022-06-19 | End: 2022-06-20 | Stop reason: HOSPADM

## 2022-06-19 RX ORDER — DEXTROSE, SODIUM CHLORIDE, AND POTASSIUM CHLORIDE 5; .45; .15 G/100ML; G/100ML; G/100ML
50 INJECTION INTRAVENOUS CONTINUOUS
Status: DISCONTINUED | OUTPATIENT
Start: 2022-06-19 | End: 2022-06-20 | Stop reason: HOSPADM

## 2022-06-19 RX ORDER — METOPROLOL SUCCINATE 25 MG/1
12.5 TABLET, EXTENDED RELEASE ORAL DAILY
Status: DISCONTINUED | OUTPATIENT
Start: 2022-06-19 | End: 2022-06-20 | Stop reason: HOSPADM

## 2022-06-19 RX ORDER — ONDANSETRON 2 MG/ML
4 INJECTION INTRAMUSCULAR; INTRAVENOUS
Status: DISCONTINUED | OUTPATIENT
Start: 2022-06-19 | End: 2022-06-20 | Stop reason: HOSPADM

## 2022-06-19 RX ORDER — FAMOTIDINE 20 MG/1
20 TABLET, FILM COATED ORAL DAILY
Status: DISCONTINUED | OUTPATIENT
Start: 2022-06-19 | End: 2022-06-20 | Stop reason: HOSPADM

## 2022-06-19 RX ORDER — ATORVASTATIN CALCIUM 20 MG/1
20 TABLET, FILM COATED ORAL DAILY
Status: DISCONTINUED | OUTPATIENT
Start: 2022-06-19 | End: 2022-06-20 | Stop reason: HOSPADM

## 2022-06-19 RX ORDER — ACETAMINOPHEN 650 MG/1
650 SUPPOSITORY RECTAL
Status: DISCONTINUED | OUTPATIENT
Start: 2022-06-19 | End: 2022-06-20 | Stop reason: HOSPADM

## 2022-06-19 RX ADMIN — TAMSULOSIN HYDROCHLORIDE 0.8 MG: 0.4 CAPSULE ORAL at 09:45

## 2022-06-19 RX ADMIN — ENOXAPARIN SODIUM 40 MG: 100 INJECTION SUBCUTANEOUS at 09:46

## 2022-06-19 RX ADMIN — POTASSIUM CHLORIDE, DEXTROSE MONOHYDRATE AND SODIUM CHLORIDE 50 ML/HR: 150; 5; 450 INJECTION, SOLUTION INTRAVENOUS at 10:31

## 2022-06-19 RX ADMIN — ALUMINUM HYDROXIDE AND MAGNESIUM HYDROXIDE 15 ML: 200; 200 SUSPENSION ORAL at 21:47

## 2022-06-19 RX ADMIN — ALUMINUM HYDROXIDE AND MAGNESIUM HYDROXIDE 15 ML: 200; 200 SUSPENSION ORAL at 09:46

## 2022-06-19 RX ADMIN — POTASSIUM CHLORIDE, DEXTROSE MONOHYDRATE AND SODIUM CHLORIDE 50 ML/HR: 150; 5; 450 INJECTION, SOLUTION INTRAVENOUS at 23:57

## 2022-06-19 RX ADMIN — METOPROLOL SUCCINATE 12.5 MG: 25 TABLET, EXTENDED RELEASE ORAL at 09:43

## 2022-06-19 RX ADMIN — IOPAMIDOL 83 ML: 755 INJECTION, SOLUTION INTRAVENOUS at 01:04

## 2022-06-19 RX ADMIN — ATORVASTATIN CALCIUM 20 MG: 20 TABLET, FILM COATED ORAL at 09:44

## 2022-06-19 RX ADMIN — FAMOTIDINE 20 MG: 20 TABLET, FILM COATED ORAL at 09:42

## 2022-06-19 RX ADMIN — ALUMINUM HYDROXIDE AND MAGNESIUM HYDROXIDE 15 ML: 200; 200 SUSPENSION ORAL at 15:49

## 2022-06-19 NOTE — H&P
[address]  [phone]    Hospitalist Admission Note      NAME:  Richard Jaramillo   :   1940   MRN:  [id number]     PCP:  Timothy Cosby MD     Date/Time of service:  2022 10:09 AM          Subjective:     CHIEF COMPLAINT: chest pain     HISTORY OF PRESENT ILLNESS:     Mr. Gianni Matute is a 78 y.o.  male who presented to the Emergency Department complaining of chest pain. Atypical.  Occurred at rest.  It is pleuritic. Started in his teeth and eyes. ER workup negative. We will admit him for observation. Past Medical History:   Diagnosis Date    Abnormal CT of the chest     CT 5/3/21: chronic worsning partial consolidation of the left lower lobe     BPH (benign prostatic hyperplasia)     saw [place]rology in [place] CAD in native artery     s/p stent x2. saw Dr. Claritza Valladares in [place]    DNR (do not resuscitate) 2018    HTN (hypertension)     Hx of diverticulitis of colon     Hypercholesterolemia     Melanoma ([address]     x4. one present on L shoulder stump. . superficial per pt    Phantom pain ([address]     L arm    Polycystic kidney disease     Post-polio syndrome age 4    dx  in [place]. He still drives. weakness, R deltoid weakness, s/p L arm amputation, R hip pain, R leg-ankle brace    Renal cyst     8 cm, increasing 20 years    Right hip pain     chronic, polio    Thrombocytopenia (HCC)     chronic. clumping seen 2021.  Urinary incontinence     Venous insufficiency     s/p venous stripping    Weakness of right leg     Zoster         Past Surgical History:   Procedure Laterality Date    HX AMPUTATION Left     arm, from polio, phantom pain    HX APPENDECTOMY      HX COLONOSCOPY  2008?  HX CORONARY STENT PLACEMENT  2006    HX CORONARY STENT PLACEMENT  2004?     HX HERNIA REPAIR Left     HX TONSILLECTOMY      HX VEIN STRIPPING         Social History     Tobacco Use    Smoking status: Never Smoker    Smokeless tobacco: Never Used   Substance Use Topics    Alcohol use: No        Family History   Problem Relation Age of Onset    Diabetes Mother       Family hx cannot be fully assessed, since the patient cannot provide information    No Known Allergies     Prior to Admission medications    Medication Sig Start Date End Date Taking? Authorizing Provider   simvastatin (ZOCOR) 40 mg tablet TAKE 1 TABLET BY MOUTH EVERYDAY AT BEDTIME 4/13/22   Chris Chavarria MD   triamcinolone acetonide (KENALOG) 0.1 % topical cream Apply  to affected area two (2) times daily as needed for Skin Irritation. use thin layer. PLEASE FILL 453 g JAR 2/17/22   Chris Chavarria MD   [facility]) 0.4 mg capsule Take 2 Capsules by mouth daily. 7/28/21   Chris Chavarria MD   acetaminophen (TYLENOL) 325 mg tablet Take 650 mg by mouth two (2) times daily as needed for Pain (Mild pain). Provider, Historical   polyvinyl alcohol (LIQUIFILM TEARS) 1.4 % ophthalmic solution Administer 1 Drop to both eyes as needed (Dry eyes). Provider, Historical   metoprolol succinate (TOPROL-XL) 25 mg XL tablet Take 12.5 mg by mouth daily. Provider, Historical   cyanocobalamin (Vitamin B-12) 1,000 mcg tablet Take 1,000 mcg by mouth daily. Provider, Historical   cholecalciferol (Vitamin D3) (1000 Units /25 mcg) tablet Take 1,000 Units by mouth daily. Provider, Historical   ascorbic acid, vitamin C, (VITAMIN C) 1,000 mg tablet Take 1,000 mg by mouth daily. Provider, Historical   therapeutic multivitamin (THERAGRAN) tablet Take 1 Tab by mouth daily.     Provider, Historical       Review of Systems:  (bold if positive, if negative)    Gen:  Eyes:  ENT:  CVS:  chest pain,Pulm:  GI:  :  MS:  Skin:  Psych:  Endo:  Hem:  Renal:  Neuro:        Objective:      VITALS:    Vital signs reviewed; most recent are:    Visit Vitals  BP (!) 151/91   Pulse 75   Temp 97.6 °F (36.4 °C)   Resp 22   Ht 6' 2\" (1.88 m)   Wt 97.5 kg (215 lb)   SpO2 93%   BMI 27.60 kg/m²     SpO2 Readings from Last 6 Encounters:   06/19/22 93%   02/17/22 94%   05/03/21 96%   04/29/21 99%   01/26/21 95%   08/27/20 94%        No intake or output data in the 24 hours ending 06/19/22 0826     Exam:     Physical Exam:    Gen:  Well-developed, well-nourished, in no acute distress  HEENT:  Pink conjunctivae, PERRL, hearing intact to voice, moist mucous membranes  Neck:  Supple, without masses, thyroid non-tender  Resp:  No accessory muscle use, clear breath sounds without wheezes rales or rhonchi  Card:  No murmurs, normal S1, S2 without thrills, bruits or peripheral edema  Abd:  Soft, non-tender, non-distended, normoactive bowel sounds are present, no mass  Lymph:  No cervical or inguinal adenopathy  Musc:  L arm amputation. No cyanosis or clubbing  Skin:  No rashes or ulcers, skin turgor is good  Neuro:  Cranial nerves are grossly intact, RLE motor weakness, follows commands appropriately  Psych:  Good insight, oriented to person, place and time, alert     Labs:    Recent Labs     06/18/22  2100   WBC 4.9   HGB 11.4*   HCT 37.8   PLT 81*     Recent Labs     06/18/22  2100      K 4.5      CO2 21   GLU 99   BUN 21*   CREA 0.96   CA 8.6   MG 2.2   PHOS 2.8   ALB 3.6   TBILI 0.6   ALT 25     No results found for: GLUCPOC  No results for input(s): PH, PCO2, PO2, HCO3, FIO2 in the last 72 hours. No results for input(s): INR, INREXT in the last 72 hours. All Micro Results     None          I have reviewed previous records       Assessment and Plan:      Chest pain - POA, pleuritic, atypical, unlikely cardiac, with very low troponin and recent unremarkable ECHO. Repeat ECHO. Consult cardiology. Add Maalox and Pepcid. He wants to DC home if possible    CAD (coronary artery disease) / HTN (hypertension) - Continue metoprolol and stimvastatin, but not ASA.  Appears stable.     Thrombocytopenia / Anemia - Chronic and stable.  This may be why he doesn't take ASA      Polycystic kidney disease - Cr stable. Post-polio syndrome / Phantom pain / R hip pain - Chronic, with R leg-ankle brace - Supportive care. Tylenol prn. Fall precautions. PT OT eval    Hypercholesterolemia - Simvastatin    BPH (benign prostatic hyperplasia) / Urinary incontinence - Continue flomax    Insomnia - Ambien prn      Telemetry reviewed:   normal sinus rhythm    Risk of deterioration: high      Total time spent with patient: 48 Minutes I personally reviewed chart, notes, data and current medications in the medical record. I have personally examined and treated the patient at bedside during this period. To assist coordination of care and communication with nursing and staff, this note may be preliminary early in the day, but finalized by end of the day.                  Care Plan discussed with: Patient, Nursing Staff, Consultant/Specialist and >50% of time spent in counseling and coordination of care    Discussed:  Care Plan and D/C Planning       ___________________________________________________    Attending Physician: Kaye Payne MD

## 2022-06-19 NOTE — ED PROVIDER NOTES
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 The patient has some old nitroglycerin that he took as well as a full dose aspirin without any significant relief of symptom or discomfort. The patient denies any fever chills, cough or congestion, headache, neck or back pain, nausea, vomiting, diarrhea, constipation, dysuria, dizziness, extremity weakness or numbness, sick contact, skin rash or recent travel, prior history of the same. Past Medical History:   Diagnosis Date    Abnormal CT of the chest     CT 5/3/21: chronic worsning partial consolidation of the left lower lobe     BPH (benign prostatic hyperplasia)     saw urology in [place] CAD in native artery     s/p stent x2. saw Dr. Judith Rivera in [facility] MD [place]    DNR (do not resuscitate) 4/17/2018    HTN (hypertension)     Hx of diverticulitis of colon     Hypercholesterolemia     Melanoma ([facility] [address]     x4. one present on L shoulder stump. 2013. superficial per pt    Phantom pain ([address]     L arm    Polycystic kidney disease     Post-polio syndrome age 4    dx 2004 in [place]. He still drives. weakness, R deltoid weakness, s/p L arm amputation, R hip pain, R leg-ankle brace    Renal cyst     8 cm, increasing 20 years    Right hip pain     chronic, polio    Thrombocytopenia (HCC)     chronic. clumping seen 1/2021.     Urinary incontinence  Venous insufficiency     s/p venous stripping    Weakness of right leg     Zoster 1982       Past Surgical History:   Procedure Laterality Date    HX AMPUTATION Left 1953    arm, from polio, phantom pain    HX APPENDECTOMY      HX COLONOSCOPY  2008?  HX CORONARY STENT PLACEMENT  2006    HX CORONARY STENT PLACEMENT  2004? 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 Tobacco Use    Smoking status: Never Smoker    Smokeless tobacco: Never Used   Substance and Sexual Activity    Alcohol use: No    Drug use: Never    Sexual activity: Yes   Other Topics Concern    Not on file   Social History Narrative    Not on file     Social Determinants of Health     Financial Resource Strain:     Difficulty of Paying Living Expenses: Not on file   Food Insecurity:     Worried About Running Out of Food in the Last Year: Not on file    Harinder of Food in the Last Year: Not on file   Transportation Needs:     Lack of Transportation (Medical): Not on file    Lack of Transportation (Non-Medical):  Not on file   Physical Activity:     Days of Exercise per Week: Not on file    Minutes of Exercise per Session: Not on file   Stress:     Feeling of Stress : Not on file   Social Connections:     Frequency of Communication with Friends and Family: Not on file    Frequency of Social Gatherings with Friends and Family: Not on file    Attends [employer] Services: Not on file    Active Member of Clubs or Organizations: Not on file    Attends Club or Organization Meetings: Not on file    Marital Status: Not on file   Intimate Partner Violence:     Fear of Current or Ex-Partner: Not on file    Emotionally Abused: Not on file    Physically Abused: Not on file    Sexually Abused: Not on file   Housing Stability:     Unable to Pay for Housing in the Last Year: Not on file    Number of [place] in the Last Year: Not on file    Unstable Housing in the Last Year: Not on file         ALLERGIES: Patient has no known allergies. Review of Systems   All other systems reviewed and are negative. Vitals:    06/18/22 2215 06/18/22 2219 06/18/22 2222 06/18/22 2235   BP: (!) 159/88 (!) 146/73     Pulse: 62 67 62    Resp: 16      Temp: 97.6 °F (36.4 °C)      SpO2: 97%   98%   Weight:       Height:                Physical Exam  Vitals and nursing note reviewed. CONSTITUTIONAL: Well-appearing; well-nourished; in no apparent distress  HEAD: Normocephalic; atraumatic  EYES: PERRL; EOM intact; conjunctiva and sclera are clear bilaterally. ENT: No rhinorrhea; normal pharynx with no tonsillar hypertrophy; mucous membranes pink/moist, no erythema, no exudate. NECK: Supple; non-tender; no cervical lymphadenopathy  CARD: Normal S1, S2; no murmurs, rubs, or gallops. Regular rate and rhythm. RESP: Normal respiratory effort; breath sounds clear and equal bilaterally; no wheezes, rhonchi, or rales. ABD: Normal bowel sounds; non-distended; non-tender; no palpable organomegaly, no masses, no bruits. Back Exam: Normal inspection; no vertebral point tenderness, no CVA tenderness. Normal range of motion. EXT: Status post left upper extremity amputation; normal ROM in all 3 extremities; non-tender to palpation; no swelling or deformity; distal pulses are normal, no edema. SKIN: Warm; dry; no rash.   NEURO:Alert and oriented x 3, coherent, FLOR-XII grossly intact, sensory and motor are non-focal.        MDM  Number of Diagnoses or Management Options  Diagnosis management comments: Assessment: 25-year-old male who presents to the ER for evaluation chest pain with jaw and neck pain consistent with angina equivalent rule out ACS, VTE, electrolyte abnormality, pleurisy and dissection. The patient remained hemodynamically stable at this time. Plan: EKG/lab/chest x-ray/aspirin/Nitropaste/serial exam/consult hospitalist and cardiology/ Monitor and Reevaluate. Amount and/or Complexity of Data Reviewed  Clinical lab tests: ordered and reviewed  Tests in the radiology section of CPT®: ordered and reviewed  Tests in the medicine section of CPT®: reviewed and ordered  Discussion of test results with the performing providers: yes  Decide to obtain previous medical records or to obtain history from someone other than the patient: yes  Obtain history from someone other than the patient: yes  Review and summarize past medical records: yes  Discuss the patient with other providers: yes  Independent visualization of images, tracings, or specimens: yes    Risk of Complications, Morbidity, and/or Mortality  Presenting problems: moderate  Diagnostic procedures: moderate  Management options: moderate           Procedures    ED EKG interpretation:  Rhythm: normal sinus rhythm; and regular . Rate (approx.): 75; Axis: normal; P wave: normal; QRS interval: Prolonged; ST/T wave: non-specific changes; in  Lead: Diffusely; LAFB; Other findings: abnormal ekg. This EKG was interpreted by Zayda Stoddard MD,ED Provider. 20;50    XRAY INTERPRETATION (ED MD)  Chest Xray  No acute process seen. Normal heart size. No bony abnormalities. No infiltrate. Jazmin Mendoza MD 1:00 AM    Repeat ED EKG interpretation:  Rhythm: normal sinus rhythm; and regular . Rate (approx.): 71; Axis: normal; P wave: normal; QRS interval: prolonged; ST/T wave: non-specific changes; in  Lead: Diffusely; LAFB; Other findings: abnormal ekg. This EKG was interpreted by Zayda Stoddard MD,ED Provider.  00:56 AM    PROGRESS NOTE:  Pt has been reexamined by Jazmin Mendoza MD all available results have been reviewed with pt and any available family. Pt understands sx, dx, and tx in ED. Care plan has been outlined and questions have been answered. Pt and any available family understands and agrees to need for admission to hospital for further tx not available in ED. Pt is ready for admission. Written by Jen Harding MD,  1:33 AM    CONSULT NOTE:  Melida De La Torre MD spoke with Dr. Marylou Sal of cardiology discussed patient's presentation, history, physical assessment, and available diagnostic results. He will see the patient in consult in the hospital in the morning. Perfect Serve Consult for Admission  1:33 AM    ED Room Number: ER07/07  Patient Name and age:  Reena Coyle 78 y.o.  male  Working Diagnosis:   1. Acute chest pain        COVID-19 Suspicion:  no  Sepsis present:  no  Reassessment needed: no  Code Status:  Full Code  Readmission: no  Isolation Requirements:  no  Recommended Level of Care:  telemetry  Department: [facility] ED - [phone]  Admitting Provider: Dr. Warren Wood    Other: Cardiology aware and will see patient in the morning in consult. Total critical care time spent exclusive of procedures: 65 minutes. Marquis Callaway

## 2022-06-19 NOTE — ED TRIAGE NOTES
Patient complaining of chest pain radiating to throat, jaw, and teeth, onset approximately 1500 this afternoon. Patient reports that he took two of his  SL nitro, which helped his pain, but the pain returned later this evening, prompting him to contact his daughter for transport to ED. Patient reports that he took an additional  SL nitro, his prescribed metoprolol, and one 325mg aspirin. Endorses cardiac history, but states it was approximately ten years ago. EKG performed by Lory Atkins RN.

## 2022-06-19 NOTE — ED NOTES
Bedside shift change report given to Maria C Weems RN (oncoming nurse) by Donavon Boucher RN (offgoing nurse). Report included the following information SBAR, ED Summary, MAR and Recent Results.

## 2022-06-19 NOTE — CONSULTS
Siva Connelly MD., [facility]    [address] [facility] [place], [address] [facility] [address]    Office [phone],DGD [phone]           6/19/2022     Admit Date: 6/18/2022      Franco Santos is a 78 y.o. male admitted for No admission diagnoses are documented for this encounter. . Consult requested by Maryann Solano MD       Assessment/Plan:    Chest pain-initial cardiac enzymes negative  History of CAD-prior PCI ([facility])  Hypertension  Hypercholesterolemia  Post polio syndrome/history of right upper extremity amputation  Chronic thrombocytopenia    Plan:  Echocardiogram  Stress nuclear study in AM.  Recommend hematology consult for thrombocytopenia in case patient would need invasive procedures/DAPT  Continue beta-blocker/statin     []    High complexity decision making was performed  []    Patient is at high-risk of decompensation with multiple organ involvement    Please do not hesitate to contact us with questions or concerns. See note below for details. Siva Connelly MD      Cardiac Testing/Procedures: A. Cardiac Cath/PCI:    B.ECHO/YAHAIRA:    C.StressNuclear/Stress ECHO/Stress test:    D.Vascular:    E. EP:    F. Miscellaneous:    History:     Patient  is a 78 y.o. male admitted for  chest pain. History of pain which started in his teeth, jaw and radiated to the retrosternal chest area. He took some  I revisited had a somewhat delayed the pain. It again required and lasted for a while which made him come to the hospital.  Currently chest pain-free. Symptoms occurred a few hours prior to admission at rest.  No recurrent chest pain in the hospital.  Initial cardiac work-up including serial high-sensitivity troponins negative. History of CAD with prior PCI/stent. Past medical history significant for BPH, CAD, hypertension, hypercholesterolemia, polycystic kidney disease, postpolio syndrome with chronic pain, s/p amputation left arm secondary to polio.   History of chronic thrombocytopenia. Currently not on aspirin. History of chronic left lower lobe atelectasis/consolidation. Currently on Nitropaste. PMH/PSH/FH/Soc Hx:     Past Medical History:   Diagnosis Date    Abnormal CT of the chest     CT 5/3/21: chronic worsning partial consolidation of the left lower lobe     BPH (benign prostatic hyperplasia)     saw urology in [place] CAD in native artery     s/p stent x2. saw Dr. Mahendra Peace in [facility] MD [place]    DNR (do not resuscitate) 4/17/2018    HTN (hypertension)     Hx of diverticulitis of colon     Hypercholesterolemia     Melanoma ([facility] [address]     x4. one present on L shoulder stump. 2013. superficial per pt    Phantom pain ([facility] [address]     L arm    Polycystic kidney disease     Post-polio syndrome age 4    dx 2004 in [place]. He still drives. weakness, R deltoid weakness, s/p L arm amputation, R hip pain, R leg-ankle brace    Renal cyst     8 cm, increasing 20 years    Right hip pain     chronic, polio    Thrombocytopenia (HCC)     chronic. clumping seen 1/2021.  Urinary incontinence     Venous insufficiency     s/p venous stripping    Weakness of right leg     Zoster 1982      Past Surgical History:   Procedure Laterality Date    HX AMPUTATION Left 1953    arm, from polio, phantom pain    HX APPENDECTOMY      HX COLONOSCOPY  2008?  HX CORONARY STENT PLACEMENT  2006    HX CORONARY STENT PLACEMENT  2004?  HX HERNIA REPAIR Left     HX TONSILLECTOMY      HX VEIN STRIPPING       No Known Allergies  Family History   Problem Relation Age of Onset    Diabetes Mother       Social History     Tobacco Use    Smoking status: Never Smoker    Smokeless tobacco: Never Used   Substance Use Topics    Alcohol use: No    Drug use: Never           Medications:       No current facility-administered medications for this encounter.      Current Outpatient Medications   Medication Sig    simvastatin (ZOCOR) 40 mg tablet TAKE 1 TABLET BY MOUTH EVERYDAY AT BEDTIME    triamcinolone acetonide (KENALOG) 0.1 % topical cream Apply  to affected area two (2) times daily as needed for Skin Irritation. use thin layer. PLEASE FILL 453 g JAR    tamsulosin (FLOMAX) 0.4 mg capsule Take 2 Capsules by mouth daily.  acetaminophen (TYLENOL) 325 mg tablet Take 650 mg by mouth two (2) times daily as needed for Pain (Mild pain).  polyvinyl alcohol (LIQUIFILM TEARS) 1.4 % ophthalmic solution Administer 1 Drop to both eyes as needed (Dry eyes).  metoprolol succinate (TOPROL-XL) 25 mg XL tablet Take 12.5 mg by mouth daily.  cyanocobalamin (Vitamin B-12) 1,000 mcg tablet Take 1,000 mcg by mouth daily.  cholecalciferol (Vitamin D3) (1000 Units /25 mcg) tablet Take 1,000 Units by mouth daily.  ascorbic acid, vitamin C, (VITAMIN C) 1,000 mg tablet Take 1,000 mg by mouth daily.  therapeutic multivitamin (THERAGRAN) tablet Take 1 Tab by mouth daily.        Review of Systems:     As in HPI - all other ROS negative        Physical Exam:       Visit Vitals  BP (!) 151/91   Pulse 75   Temp 97.6 °F (36.4 °C)   Resp 22   Ht 6' 2\" (1.88 m)   Wt 215 lb (97.5 kg)   SpO2 93%   BMI 27.60 kg/m²         Telemetry: normal sinus rhythm    Gen: Well-developed, well-nourished, in no acute distress, elderly  Neck: Supple,No Carotid Bruit,   Resp: No accessory muscle use, Clear breath sounds, No rales or rhonchi  Card: Regular Rate,Rythm,Normal S1, S2, 2/6 sys murmur+   Abd:  Soft,BS+,   MSK: No cyanosis  Skin: No rashes    Neuro: moving all four extremities , follows commands appropriately  Psych:  Good insight, oriented to person, place , alert, Nml Affect  LE: No edema  History of left upper extremity amputation    EKG: Sinus rhythm, inferior T wave changes (chronic),        Cxray: Reviewed     LABS: Reviewed      Care Plan discussed with: Patient and Nursing Staff      Total time:      mins     Ollie Dixon MD

## 2022-06-19 NOTE — ED NOTES
Verbal shift change report given to Lady  (oncoming nurse) by Nimesh Merino (offgoing nurse). Report included the following information SBAR, Kardex, ED Summary, Intake/Output, MAR and Recent Results.

## 2022-06-19 NOTE — ED NOTES
Report received from Janneth Alicea, [address] [place] [address]. Assumed care of patient at this time.

## 2022-06-19 NOTE — ED NOTES
Verbal shift change report given to Monique Huerta (oncoming nurse) by Any Saldana RN (offgoing nurse). Report included the following information SBAR, Kardex, ED Summary, [facility] and Recent Results. [id number]: lab called and stated the morning specimen hemolyzed for the CBC blood test, this nurse attempted to recollect from pt however he denied and stated he does not wish to give anymore blood and is ready to be discharged today.  Harvinder Valencia MD made aware

## 2022-06-19 NOTE — PROGRESS NOTES
Problem: Mobility Impaired (Adult and Pediatric)  Goal: *Acute Goals and Plan of Care (Insert Text)  Description: FUNCTIONAL STATUS PRIOR TO ADMISSION: Patient was modified independent using a single point cane for functional mobility. HOME SUPPORT PRIOR TO ADMISSION: The patient lived with spouse and required supervision/set-up for transfers and ambulation. Physical Therapy Goals  Initiated 6/19/2022  1. Patient will move from supine to sit and sit to supine , scoot up and down, and roll side to side in bed with independence within 7 day(s). 2.  Patient will transfer from bed to chair and chair to bed with modified independence using the least restrictive device within 7 day(s). 3.  Patient will perform sit to stand with modified independence within 7 day(s). 4.  Patient will ambulate with modified independence for 100 feet with the least restrictive device within 7 day(s). Outcome: Progressing Towards Goal   PHYSICAL THERAPY EVALUATION  Patient: Sina Golden (27 y.o. male)  Date: 6/19/2022  Primary Diagnosis: Chest pain [R07.9]        Precautions: fall, hx of Left UE amputation       ASSESSMENT  Based on the objective data described below, the patient presents with difficulty with ambulation. Communicated with nurse cleared for therapy. Patient supine on bed when received in ED. Rolled on the edge of bed min assist, supine to sit min assist, sit to stand min assist, ambulate hand held assist towards the chair min assist. Patient use a single point cane and wheelchair at baseline at home. OOB to chair as tolerated performed some active range of motion exercise on both LE all planes. Educate and instructed patient to continue active range of motion exercise on both legs while up on chair or on bed multiple times. Recommend patient to be up on the chair at least 3 times a day every meal times as tolerated.  Communicated with nurse who agreed to monitor patient     Current Level of Function Impacting Discharge (mobility/balance): min assist with transfers and ambulation hand held assist.    Functional Outcome Measure: The patient scored 45/100 on the barthel index outcome measure   Other factors to consider for discharge: fall     Patient will benefit from skilled therapy intervention to address the above noted impairments. PLAN :  Recommendations and Planned Interventions: bed mobility training, transfer training, gait training, therapeutic exercises, neuromuscular re-education, orthotic/prosthetic training, patient and family training/education, and therapeutic activities      Frequency/Duration: Patient will be followed by physical therapy:  5 times a week to address goals. Recommendation for discharge: (in order for the patient to meet his/her long term goals)  Physical therapy at least 2 days/week in the home     This discharge recommendation:  Has been made in collaboration with the attending provider and/or case management    IF patient discharges home will need the following DME: patient owns DME required for discharge         SUBJECTIVE:   Patient stated [address]     OBJECTIVE DATA SUMMARY:   HISTORY:    Past Medical History:   Diagnosis Date    Abnormal CT of the chest     CT 5/3/21: chronic worsning partial consolidation of the left lower lobe     BPH (benign prostatic hyperplasia)     saw urology in [place]    CAD in native artery     s/p stent x2. saw Dr. Claritza Valladares in [place]    DNR (do not resuscitate) 4/17/2018    HTN (hypertension)     Hx of diverticulitis of colon     Hypercholesterolemia     Melanoma ([facility] [address]     x4. one present on L shoulder stump. 2013. superficial per pt    Phantom pain (HCC)     L arm    Polycystic kidney disease     Post-polio syndrome age 4    dx 2004 in [place]. He still drives. weakness, R deltoid weakness, s/p L arm amputation, R hip pain, R leg-ankle brace    Renal cyst     8 cm, increasing 20 years    Right hip pain     chronic, polio    Thrombocytopenia ([address] chronic. clumping seen 1/2021. 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 HX CORONARY STENT PLACEMENT  2006    HX CORONARY STENT PLACEMENT  2004? HX HERNIA REPAIR Left     HX TONSILLECTOMY      HX VEIN STRIPPING         Personal factors and/or comorbidities impacting plan of care:          EXAMINATION/PRESENTATION/DECISION MAKING:   Critical Behavior:  Neurologic State: Eyes open to voice  Orientation Level: Oriented to person,Disoriented to time,Disoriented to situation,Disoriented to place  Cognition: Follows commands,Memory loss     Hearing:    Range Of Motion:  AROM: Within functional limits (hx of Left UE amputation)           PROM: Within functional limits (hx of Left UE amputation)           Strength:    Strength: Generally decreased, functional (hx of Left UE amputation)                    Tone & Sensation:                                  Coordination:     Vision:      Functional Mobility:  Bed Mobility:  Rolling: Minimum assistance  Supine to Sit: Minimum assistance  Sit to Supine: Minimum assistance  Scooting: Minimum assistance  Transfers:  Sit to Stand: Minimum assistance  Stand to Sit: Minimum assistance  Stand Pivot Transfers: Minimum assistance     Bed to Chair: Minimum assistance              Balance:   Sitting: Intact; High guard  Standing: Impaired; With support  Standing - Static: Fair  Standing - Dynamic : Fair  Ambulation/Gait Training:  Distance (ft): 5 Feet (ft)  Assistive Device: Gait belt; Other (comment) (hand held assist)  Ambulation - Level of Assistance: Minimal assistance     Gait Description (WDL): Exceptions to WDL  Gait Abnormalities: Path deviations; Step to gait        Base of Support: Widened     Speed/Eloisa: Fluctuations; Slow  Step Length: Right shortened;Left shortened                      Therapeutic Exercises:   Educate and instructed patient to continue active range of motion exercise on both legs while up on chair or on bed multiple times. Recommend patient to be up on the chair at least 3 times a day every meal times as tolerated. Functional Measure:  Barthel Index:    Bathin  Bladder: 5  Bowels: 5  Groomin  Dressin  Feeding: 10  Mobility: 0  Stairs: 0  Toilet Use: 5  Transfer (Bed to Chair and Back): 10  Total: 45/100       The Barthel ADL Index: Guidelines  1. The index should be used as a record of what a patient does, not as a record of what a patient could do. 2. The main aim is to establish degree of independence from any help, physical or verbal, however minor and for whatever reason. 3. The need for supervision renders the patient not independent. 4. A patient's performance should be established using the best available evidence. Asking the patient, friends/relatives and nurses are the usual sources, but direct observation and common sense are also important. However direct testing is not needed. 5. Usually the patient's performance over the preceding 24-48 hours is important, but occasionally longer periods will be relevant. 6. Middle categories imply that the patient supplies over 50 per cent of the effort. 7. Use of aids to be independent is allowed. Score Interpretation (from [address] [facility] [address])    Independent   60-79 Minimally independent   40-59 Partially dependent   20-39 Very dependent   <20 Totally dependent     -Jayson Daugherty., Barthel, D.W. (1965). Functional evaluation: the Barthel Index. [facility] ([address] (). The Barthel activities of daily living index: self-reporting versus actual performance in the old (> or = 75 years). Journal of [address] [place] [address] 45(7), [facility], J.JMATTF, Iza Perez., Sylvia Pool. (1999).  Measuring the change in disability after inpatient rehabilitation; comparison of the responsiveness of the Barthel Index and Functional [place] Measure. Journal of Neurology, Neurosurgery, and Psychiatry, 66(4), 430-384. OSEAS Maciel, LING Escobar, & Mary Morrell M.A. (2004) Assessment of post-stroke quality of life in cost-effectiveness studies: The usefulness of the Barthel Index and the EuroQoL-5D. Quality of Life Research, 15, 338-97           Physical Therapy Evaluation Charge Determination   History Examination Presentation Decision-Making   LOW Complexity : Zero comorbidities / personal factors that will impact the outcome / POC LOW Complexity : 1-2 Standardized tests and measures addressing body structure, function, activity limitation and / or participation in recreation  LOW Complexity : Stable, uncomplicated  Other outcome measures barthel  LOW       Based on the above components, the patient evaluation is determined to be of the following complexity level: LOW     Pain Ratin/10    Activity Tolerance:   Good    After treatment patient left in no apparent distress:   Sitting in chair, Heels elevated for pressure relief, and Call bell within reach    COMMUNICATION/EDUCATION:   The patients plan of care was discussed with: Registered nurse. Fall prevention education was provided and the patient/caregiver indicated understanding. Thank you for this referral.  Steve Thapa, PT,WCC.    Time Calculation: 28 mins

## 2022-06-20 ENCOUNTER — HOSPITAL ENCOUNTER (OUTPATIENT)
Dept: NUCLEAR MEDICINE | Age: 82
Discharge: HOME | End: 2022-06-20
Attending: INTERNAL MEDICINE
Payer: MEDICARE

## 2022-06-20 ENCOUNTER — TELEPHONE (OUTPATIENT)
Dept: INTERNAL MEDICINE CLINIC | Age: 82
End: 2022-06-20

## 2022-06-20 ENCOUNTER — APPOINTMENT (OUTPATIENT)
Dept: NUCLEAR MEDICINE | Age: 82
End: 2022-06-20
Attending: INTERNAL MEDICINE
Payer: MEDICARE

## 2022-06-20 ENCOUNTER — APPOINTMENT (OUTPATIENT)
Dept: NON INVASIVE DIAGNOSTICS | Age: 82
End: 2022-06-20
Attending: INTERNAL MEDICINE
Payer: MEDICARE

## 2022-06-20 VITALS
OXYGEN SATURATION: 96 % | WEIGHT: 215 LBS | HEART RATE: 76 BPM | BODY MASS INDEX: 27.59 KG/M2 | SYSTOLIC BLOOD PRESSURE: 185 MMHG | RESPIRATION RATE: 24 BRPM | HEIGHT: 74 IN | DIASTOLIC BLOOD PRESSURE: 87 MMHG | TEMPERATURE: 97.5 F

## 2022-06-20 LAB
ALBUMIN SERPL-MCNC: 3.3 G/DL (ref 3.5–5)
ALBUMIN/GLOB SERPL: 1.1 {RATIO} (ref 1.1–2.2)
ALP SERPL-CCNC: 70 U/L (ref 45–117)
ALT SERPL-CCNC: 18 U/L (ref 12–78)
ANION GAP SERPL CALC-SCNC: 7 MMOL/L (ref 5–15)
AST SERPL-CCNC: 22 U/L (ref 15–37)
BILIRUB SERPL-MCNC: 0.6 MG/DL (ref 0.2–1)
BUN SERPL-MCNC: 13 MG/DL (ref 6–20)
BUN/CREAT SERPL: 14 (ref 12–20)
CALCIUM SERPL-MCNC: 8.3 MG/DL (ref 8.5–10.1)
CHLORIDE SERPL-SCNC: 112 MMOL/L (ref 97–108)
CO2 SERPL-SCNC: 22 MMOL/L (ref 21–32)
CREAT SERPL-MCNC: 0.92 MG/DL (ref 0.7–1.3)
GLOBULIN SER CALC-MCNC: 2.9 G/DL (ref 2–4)
GLUCOSE SERPL-MCNC: 158 MG/DL (ref 65–100)
MAGNESIUM SERPL-MCNC: 2.1 MG/DL (ref 1.6–2.4)
NUC STRESS EJECTION FRACTION: 76 %
POTASSIUM SERPL-SCNC: 4.3 MMOL/L (ref 3.5–5.1)
PROT SERPL-MCNC: 6.2 G/DL (ref 6.4–8.2)
SODIUM SERPL-SCNC: 141 MMOL/L (ref 136–145)
STRESS BASELINE DIAS BP: 84 MMHG
STRESS BASELINE HR: 69 BPM
STRESS BASELINE SYS BP: 145 MMHG
STRESS ESTIMATED WORKLOAD: 1 METS
STRESS EXERCISE DUR MIN: 3 MIN
STRESS EXERCISE DUR SEC: 0 SEC
STRESS PEAK DIAS BP: 78 MMHG
STRESS PEAK SYS BP: 149 MMHG
STRESS PERCENT HR ACHIEVED: 77 %
STRESS POST PEAK HR: 106 BPM
STRESS RATE PRESSURE PRODUCT: NORMAL BPM*MMHG
STRESS TARGET HR: 137 BPM

## 2022-06-20 PROCEDURE — 74011250636 HC RX REV CODE- 250/636: Performed by: INTERNAL MEDICINE

## 2022-06-20 PROCEDURE — APPSS15 APP SPLIT SHARED TIME 0-15 MINUTES: Performed by: NURSE PRACTITIONER

## 2022-06-20 PROCEDURE — A9500 TC99M SESTAMIBI: HCPCS

## 2022-06-20 PROCEDURE — 74011250637 HC RX REV CODE- 250/637: Performed by: INTERNAL MEDICINE

## 2022-06-20 PROCEDURE — 80053 COMPREHEN METABOLIC PANEL: CPT

## 2022-06-20 PROCEDURE — 78452 HT MUSCLE IMAGE SPECT MULT: CPT | Performed by: INTERNAL MEDICINE

## 2022-06-20 PROCEDURE — 99214 OFFICE O/P EST MOD 30 MIN: CPT | Performed by: INTERNAL MEDICINE

## 2022-06-20 PROCEDURE — G0378 HOSPITAL OBSERVATION PER HR: HCPCS

## 2022-06-20 PROCEDURE — 83735 ASSAY OF MAGNESIUM: CPT

## 2022-06-20 PROCEDURE — 36415 COLL VENOUS BLD VENIPUNCTURE: CPT

## 2022-06-20 RX ORDER — TETRAKIS(2-METHOXYISOBUTYLISOCYANIDE)COPPER(I) TETRAFLUOROBORATE 1 MG/ML
11 INJECTION, POWDER, LYOPHILIZED, FOR SOLUTION INTRAVENOUS
Status: COMPLETED | OUTPATIENT
Start: 2022-06-20 | End: 2022-06-20

## 2022-06-20 RX ORDER — FAMOTIDINE 20 MG/1
20 TABLET, FILM COATED ORAL DAILY
Qty: 30 TABLET | Refills: 0 | Status: SHIPPED | OUTPATIENT
Start: 2022-06-20 | End: 2022-07-20

## 2022-06-20 RX ORDER — TETRAKIS(2-METHOXYISOBUTYLISOCYANIDE)COPPER(I) TETRAFLUOROBORATE 1 MG/ML
31.8 INJECTION, POWDER, LYOPHILIZED, FOR SOLUTION INTRAVENOUS
Status: COMPLETED | OUTPATIENT
Start: 2022-06-20 | End: 2022-06-20

## 2022-06-20 RX ADMIN — TETRAKIS(2-METHOXYISOBUTYLISOCYANIDE)COPPER(I) TETRAFLUOROBORATE 11 MILLICURIE: 1 INJECTION, POWDER, LYOPHILIZED, FOR SOLUTION INTRAVENOUS at 08:33

## 2022-06-20 RX ADMIN — METOPROLOL SUCCINATE 12.5 MG: 25 TABLET, EXTENDED RELEASE ORAL at 11:45

## 2022-06-20 RX ADMIN — ALUMINUM HYDROXIDE AND MAGNESIUM HYDROXIDE 15 ML: 200; 200 SUSPENSION ORAL at 11:43

## 2022-06-20 RX ADMIN — TETRAKIS(2-METHOXYISOBUTYLISOCYANIDE)COPPER(I) TETRAFLUOROBORATE 31.8 MILLICURIE: 1 INJECTION, POWDER, LYOPHILIZED, FOR SOLUTION INTRAVENOUS at 10:06

## 2022-06-20 RX ADMIN — TAMSULOSIN HYDROCHLORIDE 0.8 MG: 0.4 CAPSULE ORAL at 11:46

## 2022-06-20 RX ADMIN — FAMOTIDINE 20 MG: 20 TABLET, FILM COATED ORAL at 11:44

## 2022-06-20 RX ADMIN — REGADENOSON 0.4 MG: 0.08 INJECTION, SOLUTION INTRAVENOUS at 10:00

## 2022-06-20 RX ADMIN — POTASSIUM CHLORIDE, DEXTROSE MONOHYDRATE AND SODIUM CHLORIDE 50 ML/HR: 150; 5; 450 INJECTION, SOLUTION INTRAVENOUS at 07:30

## 2022-06-20 NOTE — ED NOTES
Discharged by provider. Leaves via wheelchair in no acute distress.  Instructions and results reviewed by MD.

## 2022-06-20 NOTE — TELEPHONE ENCOUNTER
Spoke with Nicole/Deniz (HIPPA VERIFIED) and she confirms patients appointment for 6/27/22 @ 11;20 AM.  Grateful for the call.

## 2022-06-20 NOTE — PROGRESS NOTES
CARE MANAGEMENT NOTE    Pt admitted as OBS on 6/18/22 for chest pain. CM met with Pt to complete initial assessment. Pt lives at home with his spouse. Pt and spouse are residents of [facility]. Pt has hx of HH through [facility] but is not currently open. Pt has no hx of home O2. Pt has a shower chair, grab bars, and electric w/c at home. Pt reports he is usually independent with ADLs. Pt states spouse assists as needed. Pt reports he is able to walk OK around his apartment. Pt uses the electric w/c outside of the apartment for mobility. Pt has received COVID vaccinations. Pt has an AMD.     Pt's PCP is Dr. Wilver Palacios. Pt's primary pharmacy is [facility]. Pt will return home upon discharge. Family (daughter) will transport. Medicare Outpatient Observation Notice (MOON)/ [place] Outpatient Observation Notice (Ace Peer) provided to patient/representative with verbal explanation of the notice. Time allotted for questions regarding the notice. Patient /representative provided a completed copy of the MOON/VOON notice. Copy placed on bedside chart. CM received orders for [facility]. Pt is declining HH services at this time. Pt states he does not believe he needs it. CM told Pt to call PCP if he needs HH in the future. Pt voiced understanding. Pt is interested in personal care services. CM provided education on personal care. Pt voiced understanding.  CM provided Pt with personal care list.     _____________________________________  Radha Sumner W [facility]   Available via [facility]  6/20/2022   1:30 PM

## 2022-06-20 NOTE — TELEPHONE ENCOUNTER
Please call pt [phone], he is being discharged from [facility] today needs transition appt. I could not find one. [facility] called wanting it for him,.

## 2022-06-20 NOTE — DISCHARGE SUMMARY
Physician Discharge Summary     Patient ID:  Rosy Ford  [id number]  48 y.o.  1940    Admit date: 6/18/2022    Discharge date of service and time: 6/20/2022    Admission Diagnoses: Chest pain [R07.9]    Discharge Diagnoses:    Principal Diagnosis   Chest pain                                             Hospital Course and other diagnoses  Chest pain - POA, overtly pleuritic, atypical. Unlikely cardiac, with very low troponin and unremarkable ECHO. [facility] cardiology. Stress test this AM.  DC on Pepcid.       CAD (coronary artery disease) / HTN (hypertension) - Continue metoprolol and stimvastatin, but not ASA.  Appears stable. Home today if cleared by cardiology.     Thrombocytopenia / Anemia - Chronic and stable for years. Known to hematology. This may be why he doesn't take ASA      Polycystic kidney disease - Cr stable.     Post-polio syndrome / Phantom pain / R hip pain - Chronic, with R leg-ankle brace - Supportive care. Tylenol prn.  Fall precautions.  PT OT eval recommends additional  PT session.     Hypercholesterolemia - Simvastatin     BPH (benign prostatic hyperplasia) / Urinary incontinence - Continue flomax     Insomnia - Ambien prn     PCP: Renata Homans, MD    Consults: Cardiology    Significant Diagnostic Studies: See Hospital Course    Discharged home in improved condition.     Discharge Exam:  /67   Pulse 78   Temp 98.2 °F (36.8 °C)   Resp 18   Ht 6' 2\" (1.88 m)   Wt 97.5 kg (215 lb)   SpO2 93%   BMI 27.60 kg/m²      Gen:  Well-developed, well-nourished, in no acute distress  HEENT:  Pink conjunctivae, PERRL, hearing intact to voice, moist mucous membranes  Neck:  Supple, without masses, thyroid non-tender  Resp:  No accessory muscle use, clear breath sounds without wheezes rales or rhonchi  Card:  No murmurs, normal S1, S2 without thrills, bruits or peripheral edema  Abd:  Soft, non-tender, non-distended, normoactive bowel sounds are present, no mass  Lymph:  No cervical or inguinal adenopathy  Musc:  L arm amputation. No cyanosis or clubbing  Skin:  No rashes or ulcers, skin turgor is good  Neuro:  Cranial nerves are grossly intact, RLE motor weakness, follows commands appropriately  Psych:  Good insight, oriented to person, place and time, alert    Patient Instructions:   Current Discharge Medication List      START taking these medications    Details   famotidine (PEPCID) 20 mg tablet Take 1 Tablet by mouth daily for 30 days. Over the counter, or any similar antiacid  Qty: 30 Tablet, Refills: 0         CONTINUE these medications which have NOT CHANGED    Details   simvastatin (ZOCOR) 40 mg tablet TAKE 1 TABLET BY MOUTH EVERYDAY AT BEDTIME  Qty: 90 Tablet, Refills: 1    Associated Diagnoses: Hypercholesterolemia      triamcinolone acetonide (KENALOG) 0.1 % topical cream Apply  to affected area two (2) times daily as needed for Skin Irritation. use thin layer. PLEASE FILL 453 g JAR  Qty: 453 g, Refills: 5    Associated Diagnoses: Dermatitis      acetaminophen (TYLENOL) 325 mg tablet Take 650 mg by mouth two (2) times daily as needed for Pain (Mild pain). tamsulosin (FLOMAX) 0.4 mg capsule Take 2 Capsules by mouth daily. Qty: 180 Capsule, Refills: 1      polyvinyl alcohol (LIQUIFILM TEARS) 1.4 % ophthalmic solution Administer 1 Drop to both eyes as needed (Dry eyes). metoprolol succinate (TOPROL-XL) 25 mg XL tablet Take 12.5 mg by mouth daily. cyanocobalamin (Vitamin B-12) 1,000 mcg tablet Take 1,000 mcg by mouth daily. cholecalciferol (Vitamin D3) (1000 Units /25 mcg) tablet Take 1,000 Units by mouth daily. ascorbic acid, vitamin C, (VITAMIN C) 1,000 mg tablet Take 1,000 mg by mouth daily. therapeutic multivitamin (THERAGRAN) tablet Take 1 Tab by mouth daily. Activity: Activity as tolerated and PT/OT per Home Health  Diet: Cardiac Diet and Low fat, Low cholesterol  Wound Care: None needed    Follow-up with your PCP in a few weeks.   Follow-up tests/labs - none    Signed:  Simon Gomez MD  6/20/2022  7:10 AM

## 2022-06-20 NOTE — PROGRESS NOTES
OT order received, chart reviewed, nurse consulted. Patient off floor for testing. Per nurse patient is ready to DC home. No overt ADL/mobiltiy deficits noted. Will follow up as able for OT evaluation following testing  KEREN Pritchard/STONE    Addendum:Followed up with patient after return from stress test. Educated patient on role of OT/PT. He politely declines any therapy services. He states he is at his baseline for ADL/mobiltiy. Physical therapist and RN made aware.  Completing OT order  KEREN Pritchard/STONE

## 2022-06-20 NOTE — TELEPHONE ENCOUNTER
Spoke with Nadja/Spouse (HIPPA VERIFIED) regarding upcoming appt. She requests that her daughter Hillary Farmer be notified. Grateful for the call.

## 2022-06-20 NOTE — PROGRESS NOTES
PT order received, chart reviewed, nurse consulted. Patient politely declines any therapy services. He states he is at his baseline for ADL/mobiltiy. RN made aware. Patient has discharge orders.  Completing PT order  Caryl Fountainr, PT, DPT

## 2022-06-20 NOTE — PROGRESS NOTES
CARDIOLOGY PROGRESS NOTE        [address], [place], [address] [facility]  Phone [phone]; Fax [phone]          2022 10:22 AM       Admit Date:           2022  Admit Diagnosis:  Chest pain [R07.9]  :          1940   MRN:          [id number]        Assessment/Plan  1. Chest pain: cardiac enzymes negative: for nuclear stress test today. ECHO with preserved LV function, no WMA. 2. History of CAD-prior PCI ([address] [place] [address]). Cont BB/statin. Awaiting OS records. 3Hypertension  4Hypercholesterolemia  5Post polio syndrome/history of right upper extremity amputation  6Chronic thrombocytopenia: followed by hematology OP. Ok for d/c of stress test is normal.             Pt personally seen and examined. Chart reviewed. Agree with advanced NP's history, exam and  A/P with changes/additons. No CP    Blood pressure (!) 165/92, pulse 75, temperature 97.5 °F (36.4 °C), resp. rate 16, height 6' 2\" (1.88 m), weight 215 lb (97.5 kg), SpO2 97 %. Alert/Not in acute distress  Neck - no JVD  CVS - S1 S2 +; Reg; 2/6 sys murmur+  RS : CTAB;   Abd: Soft/BS+  LE - trace edema    A/P:    Chest pain-Stress nuc study today  History of CAD-prior PCI ([address] [place] [address])  Hypertension -   Hypercholesterolemia  Post polio syndrome/history of right upper extremity amputation  Chronic thrombocytopenia     Continue beta-blocker/statin    22    NUCLEAR CARDIAC STRESS TEST 2022    Interpretation Summary    Nuclear Findings: LVEF measures 76%. LV perfusion is normal.    Nuclear Findings: Normal left ventricular systolic function post-stress. LVEF measures 76%.   Nuclear Findings: Normal pharmacological myocardial perfusion study. Findings suggest a low risk of myocardial ischemia.   ECG: Resting ECG demonstrates normal sinus rhythm and interventricular conduction delay.   ECG: Stress ECG was negative for ischemia.     Stress Test: A pharmacological stress test was performed using lexiscan. Blood pressure demonstrated a normal response and heart rate demonstrated a normal response to stress. The patient's heart rate recovery was normal. The patient reported dyspnea during the stress test.    Resting ECG: The ECG shows sinus rhythm. Resting ECG shows non-specific ST-segment abnormalities. ECG demonstrates interventricular conduction delay. The ECG shows premature atrial contractions. Signed by: Krys Caal MD on 6/20/2022  3:14 PM    06/18/22    ECHO ADULT COMPLETE 06/19/2022 6/19/2022    Interpretation Summary    Left Ventricle: Normal left ventricular systolic function with a visually estimated EF of 55 - 60%. Left ventricle size is normal. Mildly increased wall thickness. Normal wall motion. Signed by: Krys Caal MD on 6/19/2022  4:04 PM      Add stress nuc study nml - can be DC'd from cardiac standpoint    João Lew MD, [facility]          We discussed the expected course, resolution and complications of the diagnosis(es) in detail. Medication risks, benefits, costs, interactions, and alternatives were discussed as indicated. No intake/output data recorded. Last 3 Recorded Weights in this Encounter    06/18/22 2052 06/19/22 1143 06/20/22 0951   Weight: 97.5 kg (215 lb) 97.5 kg (215 lb) 97.5 kg (215 lb)         06/18 1901 - 06/20 0700  In: -   Out: Pricilla Corley 89 reports none.       Current Facility-Administered Medications   Medication Dose Route Frequency    aluminum-magnesium hydroxide (MAALOX) oral suspension 15 mL  15 mL Oral TID    famotidine (PEPCID) tablet 20 mg  20 mg Oral DAILY    metoprolol succinate (TOPROL-XL) XL tablet 12.5 mg  12.5 mg Oral DAILY    atorvastatin (LIPITOR) tablet 20 mg  20 mg Oral DAILY    tamsulosin (FLOMAX) capsule 0.8 mg  0.8 mg Oral DAILY    acetaminophen (TYLENOL) tablet 650 mg  650 mg Oral Q6H PRN    Or    acetaminophen (TYLENOL) suppository 650 mg  650 mg Rectal Q6H PRN    polyethylene glycol (MIRALAX) packet 17 g  17 g Oral DAILY PRN    ondansetron (ZOFRAN ODT) tablet 4 mg  4 mg Oral Q8H PRN    Or    ondansetron (ZOFRAN) injection 4 mg  4 mg IntraVENous Q6H PRN    enoxaparin (LOVENOX) injection 40 mg  40 mg SubCUTAneous DAILY    dextrose 5% - 0.45% NaCl with KCl 20 mEq/L infusion  50 mL/hr IntraVENous CONTINUOUS     Current Outpatient Medications   Medication Sig    famotidine (PEPCID) 20 mg tablet Take 1 Tablet by mouth daily for 30 days. Over the counter, or any similar antiacid    simvastatin (ZOCOR) 40 mg tablet TAKE 1 TABLET BY MOUTH EVERYDAY AT BEDTIME    triamcinolone acetonide (KENALOG) 0.1 % topical cream Apply  to affected area two (2) times daily as needed for Skin Irritation. use thin layer. PLEASE FILL 453 g JAR    acetaminophen (TYLENOL) 325 mg tablet Take 650 mg by mouth two (2) times daily as needed for Pain (Mild pain).  tamsulosin (FLOMAX) 0.4 mg capsule Take 2 Capsules by mouth daily.  polyvinyl alcohol (LIQUIFILM TEARS) 1.4 % ophthalmic solution Administer 1 Drop to both eyes as needed (Dry eyes).  metoprolol succinate (TOPROL-XL) 25 mg XL tablet Take 12.5 mg by mouth daily.  cyanocobalamin (Vitamin B-12) 1,000 mcg tablet Take 1,000 mcg by mouth daily.  cholecalciferol (Vitamin D3) (1000 Units /25 mcg) tablet Take 1,000 Units by mouth daily.  ascorbic acid, vitamin C, (VITAMIN C) 1,000 mg tablet Take 1,000 mg by mouth daily.  therapeutic multivitamin (THERAGRAN) tablet Take 1 Tab by mouth daily.       OBJECTIVE               Intake/Output Summary (Last 24 hours) at 6/20/2022 1022  Last data filed at 6/19/2022 2052  Gross per 24 hour   Intake --   Output 900 ml   Net -900 ml       Review of Systems - History obtained from the patient AS PER  HPI        PHYSICAL EXAM        Visit Vitals  BP (!) 145/84   Pulse 77   Temp 97.7 °F (36.5 °C)   Resp 20   Ht 6' 2\" (1.88 m)   Wt 97.5 kg (215 lb)   SpO2 94%   BMI 27.60 kg/m²       Gen: Well-developed, well-nourished, in no acute distress  alert and oriented x 3  HEENT:  Pink conjunctivae, Hearing grossly normal. No scleral icterus or conjunctival, moist mucous membranes  Neck: Supple,No JVD, No Carotid Bruit  Resp: No accessory muscle use, Clear breath sounds, No rales or rhonchi  Card: Regular Rate,Rythm,Normal S1, S2, No murmurs, rubs or gallop  MSK: No cyanosis or clubbing, good capillary refill  Skin: No rashes or ulcers, no bruising  Neuro:  Moving all four extremities, no focal deficit, follows commands appropriately  Psych:  Good insight, oriented to person, place and time, alert, Nml Affect  LE: No edema       DATA REVIEW      06/18/22    ECHO ADULT COMPLETE 06/19/2022 6/19/2022    Interpretation Summary    Left Ventricle: Normal left ventricular systolic function with a visually estimated EF of 55 - 60%. Left ventricle size is normal. Mildly increased wall thickness. Normal wall motion. Signed by: Diandra Smith MD on 6/19/2022  4:04 PM        Cardiac monitor: SR         Laboratory and Imaging have been reviewed by me and are notable for  No results for input(s): CPK, CKMB, TROIQ in the last 72 hours.   Recent Labs     06/20/22  0325 06/18/22  2100    138   K 4.3 4.5   CO2 22 21   BUN 13 21*   CREA 0.92 0.96   * 99   PHOS  --  2.8   MG 2.1 2.2   WBC  --  4.9   HGB  --  11.4*   HCT  --  37.8   PLT  --  81*

## 2022-06-20 NOTE — TELEPHONE ENCOUNTER
T/C to Nicole/Dtayo (HIPPA VERIFIED) to update on upcoming appt with Dr. Jhon Zhou on 6/27/22 @ 11:20 AM.  No answer and LVM.

## 2022-06-20 NOTE — PROGRESS NOTES
[facility]  [address] [facility], [facility] 19  [phone]    Medical Progress Note      NAME: Richard Jaramillo   :  1940  MRM:  [id number]    Date/Time of service 2022  8:43 AM          Assessment and Plan:     Chest pain - POA, overtly pleuritic, atypical. Unlikely cardiac, with very low troponin and unremarkable ECHO. Consulted cardiology, and they plan stress test for this AM.  Add Maalox and Pepcid. He wants to DC home, he does not want a cath     CAD (coronary artery disease) / HTN (hypertension) - Continue metoprolol and stimvastatin, but not ASA.  Appears stable. Stress test may show disease, but I do not think it is causing him symptoms. Home today if cleared by cardiology. He does not want a cath     Thrombocytopenia / Anemia - Chronic and stable for years. Known to hematology. This may be why he doesn't take ASA      Polycystic kidney disease - Cr stable.     Post-polio syndrome / Phantom pain / R hip pain - Chronic, with R leg-ankle brace - Supportive care. Tylenol prn. Fall precautions. PT OT eval recommends additional HH PT session.     Hypercholesterolemia - Simvastatin     BPH (benign prostatic hyperplasia) / Urinary incontinence - Continue flomax     Insomnia - Ambien prn       Subjective:     Chief Complaint:  Wants to go home    ROS:  (bold if positive, if negative)    Tolerating usual PT  Tolerating Diet        Objective:     Last 24hrs VS reviewed since prior progress note.  Most recent are:    Visit Vitals  /67   Pulse 78   Temp 98.2 °F (36.8 °C)   Resp 18   Ht 6' 2\" (1.88 m)   Wt 97.5 kg (215 lb)   SpO2 93%   BMI 27.60 kg/m²     SpO2 Readings from Last 6 Encounters:   22 93%   22 94%   21 96%   21 99%   21 95%   20 94%            Intake/Output Summary (Last 24 hours) at 2022 7333  Last data filed at 2022  Gross per 24 hour   Intake --   Output 900 ml   Net -900 ml        Physical Exam:    Gen:  Well-developed, well-nourished, in no acute distress  HEENT:  Pink conjunctivae, PERRL, hearing intact to voice, moist mucous membranes  Neck:  Supple, without masses, thyroid non-tender  Resp:  No accessory muscle use, clear breath sounds without wheezes rales or rhonchi  Card:  No murmurs, normal S1, S2 without thrills, bruits or peripheral edema  Abd:  Soft, non-tender, non-distended, normoactive bowel sounds are present, no mass  Lymph:  No cervical or inguinal adenopathy  Musc:  L arm amputation.  No cyanosis or clubbing  Skin:  No rashes or ulcers, skin turgor is good  Neuro:  Cranial nerves are grossly intact, RLE motor weakness, follows commands appropriately  Psych:  Good insight, oriented to person, place and time, alert    Telemetry reviewed:   normal sinus rhythm  __________________________________________________________________  Medications Reviewed: (see below)  Medications:     Current Facility-Administered Medications   Medication Dose Route Frequency    aluminum-magnesium hydroxide (MAALOX) oral suspension 15 mL  15 mL Oral TID    famotidine (PEPCID) tablet 20 mg  20 mg Oral DAILY    metoprolol succinate (TOPROL-XL) XL tablet 12.5 mg  12.5 mg Oral DAILY    atorvastatin (LIPITOR) tablet 20 mg  20 mg Oral DAILY    tamsulosin (FLOMAX) capsule 0.8 mg  0.8 mg Oral DAILY    acetaminophen (TYLENOL) tablet 650 mg  650 mg Oral Q6H PRN    Or    acetaminophen (TYLENOL) suppository 650 mg  650 mg Rectal Q6H PRN    polyethylene glycol (MIRALAX) packet 17 g  17 g Oral DAILY PRN    ondansetron (ZOFRAN ODT) tablet 4 mg  4 mg Oral Q8H PRN    Or    ondansetron (ZOFRAN) injection 4 mg  4 mg IntraVENous Q6H PRN    enoxaparin (LOVENOX) injection 40 mg  40 mg SubCUTAneous DAILY    dextrose 5% - 0.45% NaCl with KCl 20 mEq/L infusion  50 mL/hr IntraVENous CONTINUOUS     Current Outpatient Medications   Medication Sig    simvastatin (ZOCOR) 40 mg tablet TAKE 1 TABLET BY MOUTH EVERYDAY AT BEDTIME  triamcinolone acetonide (KENALOG) 0.1 % topical cream Apply  to affected area two (2) times daily as needed for Skin Irritation. use thin layer. PLEASE FILL 453 g JAR    acetaminophen (TYLENOL) 325 mg tablet Take 650 mg by mouth two (2) times daily as needed for Pain (Mild pain).  tamsulosin (FLOMAX) 0.4 mg capsule Take 2 Capsules by mouth daily.  polyvinyl alcohol (LIQUIFILM TEARS) 1.4 % ophthalmic solution Administer 1 Drop to both eyes as needed (Dry eyes).  metoprolol succinate (TOPROL-XL) 25 mg XL tablet Take 12.5 mg by mouth daily.  cyanocobalamin (Vitamin B-12) 1,000 mcg tablet Take 1,000 mcg by mouth daily.  cholecalciferol (Vitamin D3) (1000 Units /25 mcg) tablet Take 1,000 Units by mouth daily.  ascorbic acid, vitamin C, (VITAMIN C) 1,000 mg tablet Take 1,000 mg by mouth daily.  therapeutic multivitamin (THERAGRAN) tablet Take 1 Tab by mouth daily. Lab Data Reviewed: (see below)  Lab Review:     Recent Labs     06/18/22  2100   WBC 4.9   HGB 11.4*   HCT 37.8   PLT 81*     Recent Labs     06/20/22  0325 06/18/22  2100    138   K 4.3 4.5   * 108   CO2 22 21   * 99   BUN 13 21*   CREA 0.92 0.96   CA 8.3* 8.6   MG 2.1 2.2   PHOS  --  2.8   ALB 3.3* 3.6   TBILI 0.6 0.6   ALT 18 25     No results found for: GLUCPOC  No results for input(s): PH, PCO2, PO2, HCO3, FIO2 in the last 72 hours. No results for input(s): INR, INREXT in the last 72 hours. All Micro Results     None          Other pertinent lab: none    Total time spent with patient: 30 Minutes I personally reviewed chart, notes, data and current medications in the medical record. I have personally examined and treated the patient at bedside during this period. To assist coordination of care and communication with nursing and staff, this note may be preliminary early in the day, but finalized by end of the day.                  Care Plan discussed with: Patient, Care Manager, Nursing Staff, Consultant/Specialist and >50% of time spent in counseling and coordination of care    Discussed:  Care Plan and D/C Planning    Prophylaxis:  Lovenox and H2B/PPI    Disposition:   PT, OT, RN           ___________________________________________________    Attending Physician: Marlene Galeano MD

## 2022-06-20 NOTE — DISCHARGE INSTRUCTIONS
Patient Discharge Instructions    Katerine Forrester / [id number] : 1940    Admitted 2022 Discharged: 2022     Primary Diagnoses  Problem List as of 2022 Date Reviewed: 2022           * (Principal) Chest pain   Anemia   Polycystic kidney disease   Urinary incontinence   Thrombocytopenia (HCC)   HTN (hypertension)   DNR (do not resuscitate)   Phantom pain ([address]   Hypercholesterolemia   CAD (coronary artery disease)   Post-polio syndrome   BPH (benign prostatic hyperplasia)   Insomnia          Take Home Medications     · It is important that you take the medication exactly as they are prescribed. · Keep your medication in the bottles provided by the pharmacist and keep a list of the medication names, dosages, and times to be taken in your wallet. · Do not take other medications without consulting your doctor. What to do at Home    Recommended diet: Cardiac Diet and Low fat, Low cholesterol    Recommended activity: Activity as tolerated and PT/OT per Home Health    If you experience worse symptoms, please follow up with your pCP. Follow-up with your PCP in a few weeks    Follow-up Information     Follow up With Specialties Details Why Contact Info    Susanna Nicholson MD Internal Medicine Physician Schedule an appointment as soon as possible for a visit in 1 week  [address]  [phone]             Information obtained by :  I understand that if any problems occur once I am at home I am to contact my physician. I understand and acknowledge receipt of the instructions indicated above.                                                                                                                                            Physician's or R.N.'s Signature                                                                  Date/Time Patient or Representative Signature                                                          Date/Time

## 2022-06-21 ENCOUNTER — PATIENT OUTREACH (OUTPATIENT)
Dept: CASE MANAGEMENT | Age: 82
End: 2022-06-21

## 2022-06-21 NOTE — PROGRESS NOTES
Care Transitions Initial Call    Call within 2 business days of discharge: Yes     Patient: Roxanna Arciniega Patient : 1940 MRN: [id number]    Last Discharge [address]       Complaint Diagnosis Description Type Department Provider    22 Chest Pain Acute chest pain . .. ED (ADMIT) NUBIA Matthew MD; Marvine Essex, Will... Was this an external facility discharge? No     Challenges to be reviewed by the provider   Additional needs identified to be addressed with provider:        Method of communication with provider : none    Discussed COVID-19 related testing which was not done at this time. Advance Care Planning:   Does patient have an Advance Directive: did not address. Inpatient Readmission Risk score: No data recorded  Was this a readmission? no   Patient stated reason for the admission: chest pain    Patients top risk factors for readmission: NA   Interventions to address risk factors: NA    Care Transition Nurse (CTN) contacted the patient by telephone to perform post hospital discharge assessment. Verified name and  with patient as identifiers. Provided introduction to self, and explanation of the CTN role. Reports things are going well. Denies chest pain, sob    CTN reviewed discharge instructions, medical action plan and red flags with patient who verbalized understanding. Were discharge instructions available to patient? yes. Reviewed appropriate site of care based on symptoms and resources available to patient including: PCP. Patient given an opportunity to ask questions and does not have any further questions or concerns at this time. Medication reconciliation was NOT performed with patient,frank. Referral to Pharm D needed: no     Home Health/Outpatient orders at discharge: none    Durable Medical Equipment ordered at discharge: None    Was patient discharged with a pulse oximeter? no    Discussed follow-up appointments.  If no appointment was previously scheduled, appointment scheduling offered: no. Is follow up appointment scheduled within 7 days of discharge? no.   REHABILITATION [facility] follow up appointment(s):   Future Appointments   Date Time Provider Deon Quintero   6/27/2022 11:20 AM Wojciech Chavarria MD [facility]     Non-[facility] follow up appointment(s): NA    No further follow up per patients request.

## 2022-06-24 NOTE — PROGRESS NOTES
1/25/2021  1:36 PM  Reason for Admission: Elective - Orchitis of right testicle. Assessment:   [x]In person with pt   []Via p/c with pt   []With family member in person. Who/Relation:     []With family member via p/c. Who/Relation:   []Chart Review    RUR:  8%  Risk Level: [x]Low []Moderate []High  Value-based purchasing: [] Yes [] No  Bundle patient: [] Yes [] No   Specify:     Advance Directive: Full Code. AD on file. Assessment:    Age: 78    Sex: [x] Male []Female     Residency: []Private residence [x]Apartment ([address] [place] [address]) []Assisted Living []LTC []Other:   Exterior Steps:  0  Interior Steps: 0 (elevator)    Lives With: [x]With spouse []Other family members []Underage children []Alone []Care provider []Other:    Prior functioning:  [x]Independent []Dependent []Partial dependence   Pt requires assistance with: []Bathing []Dressing []Toileting []Ambulation     Prior DME required:  []None []RW [x]Cane []Crutches []Bedside commode []CPAP []Home O2 (Liter/Provider: ) []Nebulizer   []Shower Chair []Wheelchair []Hospital Bed []Aashish []Stair lift []Rollator [x]Other: Power wheelchair outside and about premises. DME available: [x]None []RW []Cane []Crutches []Bedside commode []CPAP []Home O2 (Liter/Provider: ) []Nebulizer   []Shower Chair []Wheelchair []Hospital Bed []Aashish []Stair lift []Rollator []Other:    Rehab history: [x]None []Outpatient PT []Home Health (Provider/Date: ) []SNF (Provider/Date: ) []IPR (Provider/Date: ) []LTC (Provider/Date: )    Discharge Concerns: []Yes [x]No []Unknown   Describe: Insurer:  Medicare / [facility]    PCP: Joceline Jesus MD   Name of Practice: [facility]   Current patient: [x]Yes []No   Approximate date of last visit: 1 year   Access to virtual PCP visits: []Yes [x]No    Pharmacy:  [facility] - [address] [address] Transport:  Family        Transition of care plan:    []Unable to determine at this time.  Awaiting clinical progress, and disposition recommendations. [x] Home with outpatient follow-up    [] Home with Outpatient PT and outpatient follow-up   Pt aware of OP appt? []Yes, Provider:   []Not scheduled   Transport provider:     [] Home with family assistance as needed and outpatient follow-up   Family able to assist:    Schedule:  Transport provider:      [] Home with Home Health   - Provider:     []SNF/IPR   -[]Preferences given:   []Listing provided and preferences requested   -Status: []Pending []Accepted:    -Auth required: []Yes []No    -Auth initiated date:   -3 midnight stay required: []Yes []No  Date satisfied:     [] Other:     Madhavi Skaggs MA    Care Management Interventions  PCP Verified by CM: Yes(Port)  Mode of Transport at Discharge:  Other (see comment)(Family)  Transition of Care Consult (CM Consult): Discharge Planning  MyChart Signup: No  Discharge Durable Medical Equipment: No  Physical Therapy Consult: Yes  Occupational Therapy Consult: Yes  Speech Therapy Consult: No  Current Support Network: Lives with Spouse  Confirm Follow Up Transport: Family  Discharge Location  Discharge Placement: Home with family assistance Extended Length of Stay

## 2022-06-27 ENCOUNTER — OFFICE VISIT (OUTPATIENT)
Dept: INTERNAL MEDICINE CLINIC | Age: 82
End: 2022-06-27
Payer: MEDICARE

## 2022-06-27 VITALS
HEART RATE: 91 BPM | OXYGEN SATURATION: 95 % | TEMPERATURE: 97.8 F | DIASTOLIC BLOOD PRESSURE: 71 MMHG | BODY MASS INDEX: 28.57 KG/M2 | WEIGHT: 222.6 LBS | SYSTOLIC BLOOD PRESSURE: 110 MMHG | HEIGHT: 74 IN | RESPIRATION RATE: 15 BRPM

## 2022-06-27 DIAGNOSIS — Z78.9: ICD-10-CM

## 2022-06-27 DIAGNOSIS — E78.00 HYPERCHOLESTEROLEMIA: ICD-10-CM

## 2022-06-27 DIAGNOSIS — D69.6 THROMBOCYTOPENIA (HCC): ICD-10-CM

## 2022-06-27 DIAGNOSIS — I25.10 CORONARY ARTERY DISEASE INVOLVING NATIVE CORONARY ARTERY OF NATIVE HEART WITHOUT ANGINA PECTORIS: ICD-10-CM

## 2022-06-27 DIAGNOSIS — R07.9 CHEST PAIN, UNSPECIFIED TYPE: Primary | ICD-10-CM

## 2022-06-27 PROCEDURE — 99495 TRANSJ CARE MGMT MOD F2F 14D: CPT | Performed by: INTERNAL MEDICINE

## 2022-06-27 PROCEDURE — G8427 DOCREV CUR MEDS BY ELIG CLIN: HCPCS | Performed by: INTERNAL MEDICINE

## 2022-06-27 RX ORDER — IBUPROFEN 400 MG/1
TABLET ORAL
COMMUNITY

## 2022-06-28 NOTE — PROGRESS NOTES
HISTORY OF PRESENT ILLNESS    Chief Complaint   Patient presents with   [facility] Follow Up     [facility]       Presents for Transitional care management (TCM) visit s/p of hospital admission from 06/18 until 06/19/2022 at [facility].    Nurse Navigator call noted to patient and documented in [address] on 06/21/22. Hospital record and relevant lab results, test results and consult notes have personally been reviewed by me at this office visit. Medications reviewed and reconciled. Patient was hospitalized for chest pain. Patient states that he was at home and noticed some discomfort in his upper chest, radiating to his neck into his lower teeth. Denied any associated shortness of breath, dysphagia, diaphoresis, weakness, palpitations. He does have a history of coronary disease and is status post stent placement 2004 2006. Hospital records reviewed personally. Extensive work-up was done including CT angiogram which revealed no evidence of pulmonary embolism. EKG showed poor R wave progression. T wave inversion but less evident than prior EKG. Troponin is negative. Echocardiogram June 19 showed ejection fraction 55 to 60% with no other significant abnormalities. Mildly increased left ventricular wall thickness. Lexiscan nuclear cardiac stress test June 20, 2022 showed ejection fraction of 76% with normal left ventricular perfusion. Low risk myocardial ischemia. Patient was discharged with a diagnosis of possible GERD. Today, patient says he is doing well. He was driven here by Josue Rover[employer] patient. He is in his wheelchair. A diagnosis of GERD since he did not have a sour taste in his mouth, but has learned that his symptoms could have been GERD. No recurrence.       Review of Systems   All other systems reviewed and are negative, except as noted in HPI    Past Medical and Surgical History   has a past medical history of Abnormal CT of the chest, BPH (benign prostatic hyperplasia), CAD in native artery, DNR (do not resuscitate) (4/17/2018), HTN (hypertension), diverticulitis of colon, Hypercholesterolemia, Melanoma ([address] Phantom pain ([address] Polycystic kidney disease, Post-polio syndrome (age 4), Renal cyst, Right hip pain, Thrombocytopenia ([address] Urinary incontinence, Venous insufficiency, Weakness of right leg, and Zoster (1982). 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 reports that he has never smoked. He has never used smokeless tobacco. He reports that he does not drink alcohol and does not use drugs. family history includes Diabetes in his mother. Physical Exam   Nursing note and vitals reviewed. Blood pressure 110/71, pulse 91, temperature 97.8 °F (36.6 °C), temperature source Oral, resp. rate 15, height 6' 2\" (1.88 m), weight 222 lb 9.6 oz (101 kg), SpO2 95 %. Constitutional:  No distress. Eyes: Conjunctivae are normal.   Ears:  Hearing grossly intact  Cardiovascular: Normal rate. regular rhythm, no murmurs or gallops  No edema  Pulmonary/Chest: Effort normal.   CTAB  Musculoskeletal: moves all 4 extremities   Neurological: Alert and oriented to person, place, and time. Skin: No rash noted. Psychiatric: Normal mood and affect. Behavior is normal.     ASSESSMENT and PLAN  Diagnoses and all orders for this visit:    1. Chest pain, unspecified type  Atypical chest pain. Resolved. Extensive cardiac work-up was benign. Agree with possible diagnosis of GERD versus esophageal spasm is less likely. No overt coronary symptoms. Continue famotidine as needed.     2. Coronary artery disease involving native coronary artery of native heart without angina pectoris  Continue current therapy with beta-blocker metoprolol, statin simvastatin  Not taking aspirin due to thrombocytopenia and history of epistaxis on it    3. Thrombocytopenia  This condition is controlled    4. Hyperlipidemia  This condition is controlled by medication therapy with simvastatin. Refilled medications. lab results and schedule of future lab studies reviewed with patient  reviewed medications and side effects in detail    Return to clinic for further evaluation if new symptoms develop        Current Outpatient Medications   Medication Sig    ibuprofen (MOTRIN) 400 mg tablet Take  by mouth every six (6) hours as needed for Pain.  famotidine (PEPCID) 20 mg tablet Take 1 Tablet by mouth daily for 30 days. Over the counter, or any similar antiacid    simvastatin (ZOCOR) 40 mg tablet TAKE 1 TABLET BY MOUTH EVERYDAY AT BEDTIME    triamcinolone acetonide (KENALOG) 0.1 % topical cream Apply  to affected area two (2) times daily as needed for Skin Irritation. use thin layer. PLEASE FILL 453 g JAR    tamsulosin (FLOMAX) 0.4 mg capsule Take 2 Capsules by mouth daily.  acetaminophen (TYLENOL) 325 mg tablet Take 650 mg by mouth two (2) times daily as needed for Pain (Mild pain).  polyvinyl alcohol (LIQUIFILM TEARS) 1.4 % ophthalmic solution Administer 1 Drop to both eyes as needed (Dry eyes).  metoprolol succinate (TOPROL-XL) 25 mg XL tablet Take 12.5 mg by mouth daily.  cyanocobalamin (Vitamin B-12) 1,000 mcg tablet Take 1,000 mcg by mouth daily.  cholecalciferol (Vitamin D3) (1000 Units /25 mcg) tablet Take 1,000 Units by mouth daily.  ascorbic acid, vitamin C, (VITAMIN C) 1,000 mg tablet Take 500 mg by mouth daily.  therapeutic multivitamin (THERAGRAN) tablet Take 1 Tab by mouth daily. No current facility-administered medications for this visit.

## 2022-09-18 DIAGNOSIS — E78.00 HYPERCHOLESTEROLEMIA: ICD-10-CM

## 2022-09-18 RX ORDER — SIMVASTATIN 40 MG/1
TABLET, FILM COATED ORAL
Qty: 90 TABLET | Refills: 1 | Status: SHIPPED | OUTPATIENT
Start: 2022-09-18

## 2022-10-20 RX ORDER — TAMSULOSIN HYDROCHLORIDE 0.4 MG/1
CAPSULE ORAL
Qty: 180 CAPSULE | Refills: 3 | Status: SHIPPED | OUTPATIENT
Start: 2022-10-20

## 2022-10-21 DIAGNOSIS — G54.6 PHANTOM PAIN (HCC): ICD-10-CM

## 2022-10-21 DIAGNOSIS — G14 POST-POLIO SYNDROME: ICD-10-CM

## 2022-10-21 RX ORDER — ACETAMINOPHEN AND CODEINE PHOSPHATE 300; 30 MG/1; MG/1
1 TABLET ORAL
Qty: 60 TABLET | Refills: 0 | Status: SHIPPED | OUTPATIENT
Start: 2022-10-21 | End: 2022-11-20

## 2022-10-25 NOTE — TELEPHONE ENCOUNTER
Spoke with patient and he requests to schedule a Virtual Apt with Dr. Gordy Lesch so he can discuss his frequent UTI's and low platelets. Advised that Dr. Gordy Lesch may want him to come into office. He states he has difficulty with transportation but if he has to come in he will try to arrange it. Dr. Gordy Lesch notified.

## 2022-10-28 PROBLEM — Z51.5 HOSPICE CARE: Status: ACTIVE | Noted: 2017-07-25

## 2022-10-28 PROBLEM — I61.9 CVA (CEREBROVASCULAR ACCIDENT DUE TO INTRACEREBRAL HEMORRHAGE) (HCC): Status: ACTIVE | Noted: 2022-10-28

## 2022-10-28 PROBLEM — I61.9 CVA (CEREBROVASCULAR ACCIDENT DUE TO INTRACEREBRAL HEMORRHAGE) (HCC): Status: ACTIVE | Noted: 2022-01-01

## 2022-10-28 PROBLEM — R45.1 RESTLESSNESS AND AGITATION: Status: ACTIVE | Noted: 2022-01-01

## 2022-10-28 PROBLEM — R06.02 SHORTNESS OF BREATH: Status: ACTIVE | Noted: 2022-01-01

## 2022-10-28 PROBLEM — R52 NONVERBAL SIGNS OF PAIN: Status: ACTIVE | Noted: 2022-01-01

## 2022-10-28 PROBLEM — R52 NONVERBAL SIGNS OF PAIN: Status: ACTIVE | Noted: 2022-10-28

## 2022-10-28 NOTE — ED TRIAGE NOTES
Pt arrives from [facility] for head bleed, currently bagging due to loss of airway per EMS. Pt placed on NRB mask while deciding of intubation status with family. Pt is unresponsive, GCS 3, no response to nail bed pressure. Snoring respirations.

## 2022-10-28 NOTE — HOSPICE
[facility]  [phone]    Inpatient Nursing Admission   Patient Name: Brigid Valencia  YOB: 1940  Age: 78 y.o. Date of Hospice Admission: 10/28/2022  Hospice Attending Elected by Patient: Walter Felton MD  Primary Care Physician: Dianne Campa MD  Admitting RN: Callie Villalobos RN  : Maddy Stearns     Level of Care (GIP/Routine/Respite): GIP  Facility of Care: [facility]  Patient Room: ER13/13     HOSPICE SUMMARY   ER Visits/ Hospitalizations in past year: 1  Hospice Diagnosis: CVA (cerebrovascular accident due to intracerebral hemorrhage) ([facility] [address] [I61.9] subdural hematoma/ intracranial hemorrhage  Onset Date of Hospice Diagnosis: today  Summary of Disease Progression Leading to Hospice Diagnosis: Per Hospital notes: 57-year-old male with history of BPH, coronary disease, DNR, hypertension, melanoma with recent melanoma surgery several days ago, thrombocytopenia presents by EMS to the emergency department with chief complaint of sudden onset of slurred speech at 7 AM this morning. He apparently woke up normal but at 7 AM had sudden onset of slurred speech. There is no history of stroke that I see in the chart. No blood thinners or antiplatelets. EMS reports blood sugar 170s. Daughter at bedside reports fall overnight around 3 AM, patient is unsure about this history. The patient called his family about 5:00 and they came to visit him. He was doing well until 7 AM when he had sudden onset of speech difficulty. The daughter also reports chronic thrombocytopenia and had surgery [facility] [place] facility for his skin cancer about 1 week ago. Prior to that surgery he was given a platelet transfusion. Pt transferred to [facility] from [facility] for a SDH w/ CT showing mass effect, midline shift and subfalcine herniation. Reportedly pt was awake and alert at OSH but pt became obtunded and somnolent w/ EMS en route.  Daughters at bedside who are POA and state that he is DNR. (EMS was reportedly unaware of this and attempted endotracheal intubation but did not give any medications.) Pt's wife has advanced dementia and daughter are decision makers. Pt unable to provide any hx on arrival.    Co-Morbidities:   Patient Active Problem List   Diagnosis Code    Phantom pain ([facility] [address] G54.6    Hypercholesterolemia E78.00    CAD (coronary artery disease) I25.10    Post-polio syndrome G14    BPH (benign prostatic hyperplasia) N40.0    Insomnia G47.00    Hospice care Z51.5    DNR (do not resuscitate) Z66    HTN (hypertension) I10    Thrombocytopenia ([facility] [address] D69.6    Polycystic kidney disease Q61.3    Urinary incontinence R32    Chest pain R07.9    Anemia D64.9    Aspirin declined by patient Z78.9    CVA (cerebrovascular accident due to intracerebral hemorrhage) ([facility] [address] I61.9    Restlessness and agitation R45.1    Nonverbal signs of pain R52    Shortness of breath R06.02     Diagnoses RELATED to the terminal prognosis: Shortness of breath/ thrombocytopenia  Other Diagnoses: post polio syndrome    Rationale for a prognosis of life expectancy of 6 months or less if the disease follows its normal course (Disease Specific History):     Brigid Valencia is a 78 y. o. who was admitted to [facility]. The patient's principle diagnosis of subdural hematoma with intracranial hemorrhage has resulted in labored breathing, unresponsive non verbal signs of pain  Functionally, the patient's Palliative Performance Scale has declined over a period of hours and is estimated at 10. Objective information that support this patients limited prognosis includes: shortness of breath, using accessory muscles to breath, unresponsive. The patient/family chose comfort measures with the support of Hospice.     Patient meets for GIP LOC as evidenced by shortness of breath and non verbal pain    Prognosis estimated based on 10/28/22 clinical assessment is:   [] Few to Many Hours  [x] Hours to Days   [] Few to Many Days   [] Days to Weeks   [] Few to Many Weeks   [] Weeks to Months   [] Few to Many Months    ASSESSMENT    Patient self-reports:  []  Yes    [x] No    SYMPTOMS: shortness of breath and non verbal pain    SIGNS/PHYSICAL FINDINGS: shortness of breath, using accessory muscles to breath, unresponsive. KARNOFSKY: 10    FAST for all dementia:      Learning Assessment:  Patient  Is patient willing/able to learn? no  What is the highest level of education completed? Learning preference (written material, demonstration, visual)? Learning barriers (ESOL, [place], poor vision)? Caregiver  Is caregiver willing to learn care for patient? yes  What is the highest level of education completed? Learning preference (written material, demonstration, visual)? Learning barriers (ESOL, [place], poor vision)? CLINICAL INFORMATION     Wt Readings from Last 3 Encounters:   10/28/22 102 kg (224 lb 13.9 oz)   10/28/22 102 kg (224 lb 13.9 oz)   06/27/22 101 kg (222 lb 9.6 oz)     Ht Readings from Last 3 Encounters:   10/28/22 6' 1\" (1.854 m)   10/28/22 6' 1\" (1.854 m)   06/27/22 6' 2\" (1.88 m)     Body mass index is 29.67 kg/m². Visit Vitals  /66   Pulse (!) 111   Resp 22   Ht 6' 1\" (1.854 m)   Wt 102 kg (224 lb 13.9 oz)   SpO2 99%   BMI 29.67 kg/m²       LAB VALUES  No results found for this visit on 10/28/22 (from the past 12 hour(s)). No results found for this visit on 10/28/22 (from the past 6 hour(s)). Lab Results   Component Value Date/Time    Protein, total 6.9 10/28/2022 11:07 AM    Albumin 3.4 (L) 10/28/2022 11:07 AM       Currently this patient has:  [x] Supplemental O2 [x] Peripheral IV  [] PICC    [] PORT   [] Hansen Catheter [] NG Tube   [] PEG Tube [] Ostomy    [] AICD: Has ICD been deactivated? [] Yes [] No:______    PLAN     1.  Patient admitted [facility] care as patient needs frequent nursing assessments, IV medication management, high risk for a very rapid decline and not safe to attempt to transition home.  Patient received multiple as needed doses of morphine prior to our evaluation and still show some evidence of increased work of breathing. Dilaudid 1 mg IV every 3 hours scheduled every 15 minutes as needed for pain and shortness of breath  Versed 5 mg IV every 3 hours scheduled for restlessness as well as seizure prevention  Comfort meds for all other symptoms  Plan reviewed with bedside nursing team as well as hospice liaison  Sw and  to give support to family    Hospice Team Frequency Orders:  Skilled Nurse -   Daily x 7 days /every other day x 7 days  with 5 PRN visits for symptom control. MSW - 1 visit for initial assessment/evaluation for family support and need for volunteer services. Moose Rivas - 1 visit for initial assessment/evaluation for spiritual support. ADVANCE CARE PLANNING (Complete in ACP Flow Sheet)   Code Status: DNR  Durable DNR: [x]  Yes  []  No  Code Status Discussed/Confirmed: with daughter  Preference for Other Life Sustaining Treatment Discussed/Confirmed: With daughter  Hospitalization Preference:  Patient would like to receive end of life care in the hospital,. Advance Care Planning 2021   Confirm Advance Directive Yes, not on file        Service: [] Yes  [x]  No      [] Unknown  Appropriate for Pinning Ceremony:  [] Yes     [] No  Restorationist: Mandaen   Home: cremation    DISCHARGE PLANNING     1. Discharge Plan: will likely pass in the hospital  2. Patient/Family teaching: symptom management  3.  Response to patient/family teaching: verbalizes understanding    SOCIAL/EMOTIONAL/SPIRITUAL NEEDS     Spiritual Issues Identified: family needs support    Psych/ Social/ Emotional Issues Identified: family in distress it happened so fast    Caregiver Support:  [x] Provided information on End of Life Care   [] Material Provided: [facility] From [facility]ight or Marilee Velasquez contacted, discharge to hospice order received   Chio Coronel contacted, agrees to serve as attending provider for hospice and provided verbal certification of terminal illness with life expectancy of 6 months or less. Orders for hospice admission, medications and plan of treatment received. Medication reconciliation completed. MEDS: See medication list below  DME: Per hospital  Supplies: Per hospital  IDT communication to include MD, , SW, CH and support team    ALLERGIES AND MEDICATIONS     Allergies: No Known Allergies  Current Facility-Administered Medications   Medication Dose Route Frequency    midazolam (VERSED) injection 5 mg  5 mg IntraVENous Q15MIN PRN    ketorolac (TORADOL) injection 30 mg  30 mg IntraVENous Q8H PRN    glycopyrrolate (ROBINUL) injection 0.2 mg  0.2 mg IntraVENous Q4H PRN    bisacodyL (DULCOLAX) suppository 10 mg  10 mg Rectal DAILY PRN    HYDROmorphone (DILAUDID) injection 1 mg  1 mg IntraVENous Q15MIN PRN    HYDROmorphone (DILAUDID) injection 1 mg  1 mg IntraVENous Q3H    midazolam (VERSED) injection 5 mg  5 mg IntraVENous Q3H    glycopyrrolate (ROBINUL) injection 0.2 mg  0.2 mg IntraVENous Q6H     Current Outpatient Medications   Medication Sig    acetaminophen-codeine (TYLENOL #3) 300-30 mg per tablet Take 1 Tablet by mouth every six (6) hours as needed for Pain (Severe pain) for up to 30 days. Max Daily Amount: 4 Tablets. tamsulosin (FLOMAX) 0.4 mg capsule TAKE 1 CAPSULE BY MOUTH TWICE A DAY    simvastatin (ZOCOR) 40 mg tablet TAKE 1 TABLET BY MOUTH EVERYDAY AT BEDTIME    ibuprofen (MOTRIN) 400 mg tablet Take  by mouth every six (6) hours as needed for Pain.    triamcinolone acetonide (KENALOG) 0.1 % topical cream Apply  to affected area two (2) times daily as needed for Skin Irritation. use thin layer. PLEASE FILL 453 g JAR    acetaminophen (TYLENOL) 325 mg tablet Take 650 mg by mouth two (2) times daily as needed for Pain (Mild pain).     polyvinyl alcohol (LIQUIFILM TEARS) 1.4 % ophthalmic solution Administer 1 Drop to both eyes as needed (Dry eyes). metoprolol succinate (TOPROL-XL) 25 mg XL tablet Take 12.5 mg by mouth daily. cyanocobalamin (Vitamin B-12) 1,000 mcg tablet Take 1,000 mcg by mouth daily. cholecalciferol (Vitamin D3) (1000 Units /25 mcg) tablet Take 1,000 Units by mouth daily. ascorbic acid, vitamin C, (VITAMIN C) 1,000 mg tablet Take 500 mg by mouth daily. therapeutic multivitamin (THERAGRAN) tablet Take 1 Tab by mouth daily.

## 2022-10-28 NOTE — PROGRESS NOTES
Problem: Dyspnea Due to End of Life  Goal: Demonstrate understanding of and ability to manage respiratory symptoms at end of life  Outcome: Progressing Towards Goal     Problem: Communication Deficit  Goal: Effectively communicate symptoms, needs, and concerns  Outcome: Progressing Towards Goal     Problem: Imminent Death  Goal: Collaborate with patient/family/caregiver/interdisciplinary team to minimize and manage end of life symptoms  Outcome: Progressing Towards Goal

## 2022-10-28 NOTE — ED TRIAGE NOTES
Patient arrives to the ED via EMS from [facility]. Patient's daughter began to notice slurred speech that began at 7 am.     Code Stroke Level 1 called en route. .     Patient recently hospitalized for surgery to upper chest for melanoma removal.

## 2022-10-28 NOTE — H&P
[facility]  [phone]    Patient Name: Michael Atkins  YOB: 1940    Date of Provider Hospice Visit: 10/28/22    Level of Care:   [x] General Inpatient (GIP)    [] Routine   [] Respite    Current Location of Care:  [x] [facility] [] [facility] [] [facility] [] [facility] [] [facility], patient referred from:  [] [facility] [] [facility] [] [facility] [] [facility] [x] Home [] Other:     Date of [facility] Admission: 10/28/2022  Hospice Medical Director at time of admission: [employer] [employer] Diagnosis: Subdural hematoma/intracranial hemorrhage  Diagnoses RELATED to the terminal prognosis: Thrombocytopenia  Other Diagnoses:      Cory Allison is a 78y.o. year old who was admitted to [address] [place] [address]. Patient is an 49-year-old male who family describes an episode where he was struggling with speech earlier this morning. There is some questionable whether he actually had a fall at home but his wife was sleeping. Around 6 AM, patient told his wife and daughter that he needed to come to the hospital.  Patient's wife is from [place] and maybe has some underlying dementia. Upon arrival at [facility], patient with a CT scan showed a subdural hematoma with mass-effect, midline shift, and subfalcine herniation. Patient became obtunded on transportation somnolent. Discussion was had with family about options moving forward and neurosurgery also reviewed the chart. Patient very high risk for further complications given his thrombocytopenia and this large bleed.   Family elected to focus on comfort with support hospice and given his symptom management needs, patient was admitted under GIP level care    The patient's principle diagnosis has resulted in shortness of breath, nonverbal signs of pain  Refer to LCD     Functionally, the patient's Karnofsky and/or Palliative Performance Scale has declined over a period of hours and is estimated at 8. The patient is dependent on the following ADLs: All    Objective information that support this patients limited prognosis includes:   CT scan   IMPRESSION  Large left convexity subdural hematoma causing significant local mass effect and  subfalcine herniation. Smaller left anterior parafalcine subdural hematoma    The patient/family chose comfort measures with the support of Hospice. HOSPICE DIAGNOSES   Active Symptoms:  1. Shortness of breath  2. Restlessness  3. Large subdural hematoma  4. Nonverbal signs of pain  5. Hospice care     PLAN   Patient admitted GIP [facility] care as patient needs frequent nursing assessments, IV medication management, high risk for a very rapid decline and not safe to attempt to transition home. Patient received multiple as needed doses of morphine prior to our evaluation and still show some evidence of increased work of breathing. Dilaudid 1 mg IV every 3 hours scheduled every 15 minutes as needed for pain and shortness of breath  Versed 5 mg IV every 3 hours scheduled for restlessness as well as seizure prevention  Comfort meds for all other symptoms  Plan reviewed with bedside nursing team as well as hospice liaison  Spent time with patient's family to include daughter, son-in-law, spouse at the bedside. We again reviewed imaging as well as what prior providers had talk with him about. They do understand the patient is nearing the end of life and would like to focus on comfort. They are very thankful that he can stay at the hospital and focus on his comfort.  and SW to support family needs  Disposition: Death at the hospital  Hospice Plan of care was reviewed in detail and agree with current plan of care    Prognosis estimated based on 10/28/22 clinical assessment is:   [x] Hours to Days    [] Days to Weeks    [] Other:    Communicated plan of care with: Hospice Case Manager;  Hospice IDT; Care Team     GOALS OF CARE     Patient/Medical POA stated Goal of Care: Hospice care    [x] I have reviewed and/or updated ACP information in the Advance Care Planning Navigator. This information is available in the [address] link in the patient's chart header.     Primary Decision [facility] Agent):   Primary Decision Maker: Nadja Quijano - Spouse - [phone]    Secondary Decision Maker: Nicole Davis - Daughter - [phone]    Secondary Decision Maker: Yazmin Dillon - Daughter    Resuscitation Status: DNR  If DNR is there a Durable DNR on file? : [x] Yes [] No (If no, complete Durable DNR)    HISTORY     History obtained from: Chart, family    CHIEF COMPLAINT: Unresponsive  The patient is:   [] Verbal  [] Nonverbal  [x] Unresponsive    HPI/SUBJECTIVE: Patient currently is unresponsive but does show some evidence of increased work of breathing       REVIEW OF SYSTEMS     The following systems were: [] reviewed  [x] unable to be reviewed    Positive ROS include:  Constitutional: fatigue, weakness, in pain, short of breath  Ears/nose/mouth/throat: increased airway secretions  Respiratory:shortness of breath, wheezing  Gastrointestinal:poor appetite, nausea, vomiting, abdominal pain, constipation, diarrhea  Musculoskeletal:pain, deformities, swelling legs  Neurologic:confusion, hallucinations, weakness  Psychiatric:anxiety, feeling depressed, poor sleep  Endocrine:     Adult Non-Verbal Pain Assessment Score: 6    Face  [] 0   No particular expression or smile  [x] 1   Occasional grimace, tearing, frowning, wrinkled forehead  [] 2   Frequent grimace, tearing, frowning, wrinkled forehead    Activity (movement)  [] 0   Lying quietly, normal position  [] 1   Seeking attention through movement or slow, cautious movement  [x] 2   Restless, excessive activity and/or withdrawal reflexes    Guarding  [] 0   Lying quietly, no positioning of hands over areas of body  [x] 1   Splinting areas of the body, tense  [] 2   Rigid, stiff    Physiology (vital signs)  [x] 0 Stable vital signs  [] 1   Change in any of the following: SBP > 20mm Hg; HR > 20/minute  [] 2   Change in any of the following: SBP > 30mm Hg; HR > 25/minute    Respiratory  [] 0   Baseline RR/SpO2, compliant with ventilator  [] 1   RR > 10 above baseline, or 5% drop SpO2, mild asynchrony with ventilator  [x] 2   RR > 20 above baseline, or 10% drop SpO2, asynchrony with ventilator     FUNCTIONAL ASSESSMENT     Palliative Performance Scale (PPS): 10       PSYCHOSOCIAL/SPIRITUAL ASSESSMENT     Active Problems:    CVA (cerebrovascular accident due to intracerebral hemorrhage) ([facility] [address] (10/28/2022)      Past Medical History:   Diagnosis Date    Abnormal CT of the chest     CT 5/3/21: chronic worsning partial consolidation of the left lower lobe     Aspirin declined by patient     declines due to hx of epistaxis on asa and due to chronic thombocytopenia    BPH (benign prostatic hyperplasia)     saw urology in [place]    CAD in native artery 2004    s/p stent x2. saw Dr. Jessica Shea in [place]    DNR (do not resuscitate) 4/17/2018    HTN (hypertension)     Hx of diverticulitis of colon     Hypercholesterolemia     Melanoma ([facility] [address]     x4. one present on L shoulder stump. 2013. superficial per pt    Phantom pain (HCC)     L arm    Polycystic kidney disease     Post-polio syndrome age 4    dx 2004 in [place]. He still drives. weakness, R deltoid weakness, s/p L arm amputation, R hip pain, R leg-ankle brace    Renal cyst     8 cm, increasing 20 years    Right hip pain     chronic, polio    Thrombocytopenia (HCC)     chronic. clumping seen 1/2021. 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 HX CORONARY STENT PLACEMENT  2006    HX CORONARY STENT PLACEMENT  2004?     HX HERNIA REPAIR Left     HX TONSILLECTOMY      HX VEIN STRIPPING        Social History     Tobacco Use    Smoking status: Never    Smokeless tobacco: Never   Substance Use Topics    Alcohol use: No     Family History   Problem Relation Age of Onset    Diabetes Mother       No Known Allergies   Current Facility-Administered Medications   Medication Dose Route Frequency    midazolam (VERSED) injection 5 mg  5 mg IntraVENous Q15MIN PRN    ketorolac (TORADOL) injection 30 mg  30 mg IntraVENous Q8H PRN    glycopyrrolate (ROBINUL) injection 0.2 mg  0.2 mg IntraVENous Q4H PRN    bisacodyL (DULCOLAX) suppository 10 mg  10 mg Rectal DAILY PRN    HYDROmorphone (DILAUDID) injection 1 mg  1 mg IntraVENous Q15MIN PRN    HYDROmorphone (DILAUDID) injection 1 mg  1 mg IntraVENous Q3H    midazolam (VERSED) injection 5 mg  5 mg IntraVENous Q3H    glycopyrrolate (ROBINUL) injection 0.2 mg  0.2 mg IntraVENous Q6H     Current Outpatient Medications   Medication Sig    acetaminophen-codeine (TYLENOL #3) 300-30 mg per tablet Take 1 Tablet by mouth every six (6) hours as needed for Pain (Severe pain) for up to 30 days. Max Daily Amount: 4 Tablets. tamsulosin (FLOMAX) 0.4 mg capsule TAKE 1 CAPSULE BY MOUTH TWICE A DAY    simvastatin (ZOCOR) 40 mg tablet TAKE 1 TABLET BY MOUTH EVERYDAY AT BEDTIME    ibuprofen (MOTRIN) 400 mg tablet Take  by mouth every six (6) hours as needed for Pain.    triamcinolone acetonide (KENALOG) 0.1 % topical cream Apply  to affected area two (2) times daily as needed for Skin Irritation. use thin layer. PLEASE FILL 453 g JAR    acetaminophen (TYLENOL) 325 mg tablet Take 650 mg by mouth two (2) times daily as needed for Pain (Mild pain). polyvinyl alcohol (LIQUIFILM TEARS) 1.4 % ophthalmic solution Administer 1 Drop to both eyes as needed (Dry eyes). metoprolol succinate (TOPROL-XL) 25 mg XL tablet Take 12.5 mg by mouth daily. cyanocobalamin (Vitamin B-12) 1,000 mcg tablet Take 1,000 mcg by mouth daily.     cholecalciferol (Vitamin D3) (1000 Units /25 mcg) tablet Take 1,000 Units by mouth daily. ascorbic acid, vitamin C, (VITAMIN C) 1,000 mg tablet Take 500 mg by mouth daily. therapeutic multivitamin (THERAGRAN) tablet Take 1 Tab by mouth daily.         PHYSICAL EXAM     Wt Readings from Last 3 Encounters:   10/28/22 102 kg (224 lb 13.9 oz)   10/28/22 102 kg (224 lb 13.9 oz)   06/27/22 101 kg (222 lb 9.6 oz)       Visit Vitals  /68   Pulse (!) 107   Resp (!) 46   Ht 6' 1\" (1.854 m)   Wt 102 kg (224 lb 13.9 oz)   SpO2 99%   BMI 29.67 kg/m²       Supplemental O2  [x] Yes  [] NO  Last bowel movement:     Currently this patient has:  [x] Peripheral IV [] PICC  [] PORT [] ICD    [x] Hansen Catheter [] NG Tube   [] PEG Tube    [] Rectal Tube [] Drain  [] Other:     Constitutional: Ill-appearing, unresponsive, slight grimacing, GCS of 3  Eyes: Pupils fixed and dilated  ENMT: Dry  Cardiovascular: Appears regular rate and rhythm  Respiratory: Increased work of breathing, mild accessory muscle use, few secretions  Gastrointestinal: Soft  Musculoskeletal: Unremarkable  Skin: Currently warm extremities  Neurologic: Unresponsive, occasional myoclonic type jerking in the lower extremities  Psychiatric: Restless at times  Other:       Pertinent Lab and or Imaging Tests:  Lab Results   Component Value Date/Time    Sodium 139 10/28/2022 11:07 AM    Potassium 3.8 10/28/2022 11:07 AM    Chloride 106 10/28/2022 11:07 AM    CO2 23 10/28/2022 11:07 AM    Anion gap 10 10/28/2022 11:07 AM    Glucose 119 (H) 10/28/2022 11:07 AM    BUN 19 10/28/2022 11:07 AM    Creatinine 1.10 10/28/2022 11:07 AM    BUN/Creatinine ratio 17 10/28/2022 11:07 AM    GFR est AA >60 06/20/2022 03:25 AM    GFR est non-AA >60 06/20/2022 03:25 AM    Calcium 8.9 10/28/2022 11:07 AM     Lab Results   Component Value Date/Time    Protein, total 6.9 10/28/2022 11:07 AM    Albumin 3.4 (L) 10/28/2022 11:07 AM           Total time:   Counseling / coordination time:   > 50% counseling / coordination?:

## 2022-10-28 NOTE — ED NOTES
Has a final transfer review been performed?  Yes    Reason for Admission:SDH, hospice  Patient comes from: home  Mental Status: Other unresponsive  ADL:unresponsive  Ambulation:unresponsive  Pertinent Info/Safety Concerns: family at bedside  COVID Status: Not Indicated  MEWS Score: 5     Vitals:    10/28/22 1740 10/28/22 1810 10/28/22 1825 10/28/22 1840   BP:    123/74   Pulse: (!) 111 (!) 108  (!) 108   Resp: 22 29  16   Temp:    97 °F (36.1 °C)   SpO2: 99% 96%  96%   Weight:   102 kg (224 lb 13.9 oz)    Height:   6' 1\" (1.854 m)      Lines:     Transport mode:Hospital bed    Bonniechato Huerta  being transferred to [facility](unit) for routine progression of care     \"Notification of etransfer note given to (Name) Víctor (Title) Sec

## 2022-10-28 NOTE — ED NOTES
TRANSFER - OUT REPORT:    Verbal report given to Justyn Dangelo RN(name) on Christiano Dunbar  being transferred to [facility] (unit) for routine progression of care       Report consisted of patients Situation, Background, Assessment and   Recommendations(SBAR). Information from the following report(s) SBAR, Kardex, ED Summary, [facility] and Recent Results was reviewed with the receiving nurse. Lines:   Peripheral IV 10/28/22 Right Antecubital (Active)   Site Assessment Clean, dry, & intact 10/28/22 1103   Phlebitis Assessment 0 10/28/22 1103   Infiltration Assessment 0 10/28/22 1103   Dressing Status Clean, dry, & intact 10/28/22 1103   Hub Color/Line Status Blue 10/28/22 1103   Action Taken Blood drawn 10/28/22 1103       Peripheral IV 10/28/22 Right Hand (Active)   Site Assessment Clean, dry, & intact 10/28/22 1112   Phlebitis Assessment 0 10/28/22 1112   Infiltration Assessment 0 10/28/22 1112   Hub Color/Line Status Blue 10/28/22 1112        Opportunity for questions and clarification was provided.       Patient transported with: SHABNAM

## 2022-10-28 NOTE — ED NOTES
Stroke Education provided to relative(s) and the following topics were discussed    1. Patients personal risk factors for stroke are hypertension    2. Warning signs of Stroke:        * Sudden numbness or weakness of the face, arm or leg, especially on one side of          The body            * Sudden confusion, trouble speaking or understanding        * Sudden trouble seeing in one or both eyes        * Sudden trouble walking, dizziness, loss of balance or coordination        * Sudden severe headache with no known cause      3. Importance of activation Emergency Medical Services ( 9-1-1 ) immediately if experience any warning signs of stroke. 4. Be sure and schedule a follow-up appointment with your primary care doctor or any specialists as instructed. 5. You must take medicine every day to treat your risk factors for stroke. Be sure to take your medicines exactly as your doctor tells you: no more, no less. Know what your medicines are for , what they do. Anti-thrombotics /anticoagulants can help prevent strokes. You are taking the following medicine(s)  N/A     6. Smoking and second-hand smoke greatly increase your risk of stroke, cardiovascular disease and death. Smoking history never    7. Information provided was [facility] Stroke Education Jeny Asp or Verbal Education    8. Documentation of teaching completed in Patient Education Activity and on Care Plan with teaching response noted?   yes

## 2022-10-28 NOTE — ED PROVIDER NOTES
68-year-old male with history of BPH, coronary disease, DNR, hypertension, melanoma with recent melanoma surgery several days ago, thrombocytopenia presents by EMS to the emergency department with chief complaint of sudden onset of slurred speech at 7 AM this morning. He apparently woke up normal but at 7 AM had sudden onset of slurred speech. There is no history of stroke that I see in the chart. No blood thinners or antiplatelets. EMS reports blood sugar 170s. Daughter at bedside reports fall overnight around 3 AM, patient is unsure about this history. The patient called his family about 5:00 and they came to visit him. He was doing well until 7 AM when he had sudden onset of speech difficulty. The daughter also reports chronic thrombocytopenia and had surgery at [facility] facility for his skin cancer about 1 week ago. Prior to that surgery he was given a platelet transfusion. The history is provided by the patient, medical records and the EMS personnel. Extremity Weakness   This is a new problem. The current episode started 3 to 5 hours ago. The problem occurs constantly. The patient is experiencing no pain. There has been no history of extremity trauma. Past Medical History:   Diagnosis Date    Abnormal CT of the chest     CT 5/3/21: chronic worsning partial consolidation of the left lower lobe     Aspirin declined by patient     declines due to hx of epistaxis on asa and due to chronic thombocytopenia    BPH (benign prostatic hyperplasia)     saw urology in [address] [place] [address] CAD in native artery 2004    s/p stent x2. saw Dr. Yelena Peralta [place]    DNR (do not resuscitate) 4/17/2018    HTN (hypertension)     Hx of diverticulitis of colon     Hypercholesterolemia     Melanoma ([address]     x4. one present on L shoulder stump. 2013. superficial per pt    Phantom pain ([address]     L arm    Polycystic kidney disease     Post-polio syndrome age 4    dx 2004 in [place]. He still drives. weakness, R deltoid weakness, s/p L arm amputation, R hip pain, R leg-ankle brace    Renal cyst     8 cm, increasing 20 years    Right hip pain     chronic, polio    Thrombocytopenia (HCC)     chronic. clumping seen 1/2021. 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  HX CORONARY STENT PLACEMENT  2006    HX CORONARY STENT PLACEMENT  2004? 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 Tobacco Use    Smoking status: Never    Smokeless tobacco: Never   Substance and Sexual Activity    Alcohol use: No    Drug use: Never    Sexual activity: Yes   Other Topics Concern    Not on file   Social History Narrative    Not on file     Social Determinants of Health     Financial Resource Strain: Not on file   Food Insecurity: Not on file   Transportation Needs: Not on file   Physical Activity: Not on file   Stress: Not on file   Social Connections: Not on file   Intimate Partner Violence: Not on file   Housing Stability: Not on file         ALLERGIES: Patient has no known allergies. Review of Systems   Constitutional:  Negative for fatigue and fever. HENT:  Negative for sneezing and sore throat. Respiratory:  Negative for cough and shortness of breath. Cardiovascular:  Negative for chest pain and leg swelling. Gastrointestinal:  Negative for abdominal pain, diarrhea, nausea and vomiting. Genitourinary:  Negative for difficulty urinating and dysuria. Musculoskeletal:  Negative for arthralgias and myalgias. Skin:  Negative for color change and rash. Neurological:  Positive for speech difficulty. Negative for weakness and headaches. Psychiatric/Behavioral:  Negative for agitation and behavioral problems. There were no vitals filed for this visit. Physical Exam  Vitals and nursing note reviewed. Constitutional:       General: He is not in acute distress. Appearance: He is well-developed. He is ill-appearing. He is not toxic-appearing or diaphoretic. HENT:      Head: Normocephalic. Nose: Nose normal.      Mouth/Throat:      Mouth: Mucous membranes are moist.      Pharynx: Oropharynx is clear. Eyes:      Extraocular Movements: Extraocular movements intact. Conjunctiva/sclera: Conjunctivae normal.      Pupils: Pupils are equal, round, and reactive to light. Cardiovascular:      Rate and Rhythm: Normal rate and regular rhythm. Pulses: Normal pulses. Heart sounds: No murmur heard. Pulmonary:      Effort: Pulmonary effort is normal. No respiratory distress. Breath sounds: Normal breath sounds. No wheezing. Chest:      Chest wall: No mass or tenderness. Abdominal:      General: There is no distension. Palpations: Abdomen is soft. Tenderness: There is no abdominal tenderness. There is no guarding or rebound. Musculoskeletal:         General: No swelling, tenderness, deformity or signs of injury. Normal range of motion. Cervical back: Normal range of motion and neck supple. No rigidity. No muscular tenderness. Right lower leg: No tenderness. No edema. Left lower leg: No tenderness. No edema. Comments: Status post left upper extremity amputation   Skin:     General: Skin is warm and dry. Capillary Refill: Capillary refill takes less than 2 seconds.    Neurological:      Mental Status: He is alert and oriented to person, place, and time. Comments: Patient waxing and waning speech difficulty, sometimes makes sense and is coherent, sometimes exhibits incomprehensible sounds or mixed words   Psychiatric:         Mood and Affect: Mood normal.         Behavior: Behavior normal.        MDM  Number of Diagnoses or Management Options  History of thrombocytopenia  SDH (subdural hematoma)  Diagnosis management comments: 57-year-old male presents as above as a level 1 code stroke with waxing and waning speech abnormality. Otherwise neurologically intact. Complaining headache. Has significant SDH with history of thrombocytopenia on imaging today. Plan for emergent transfer to [facility] for further management. Will control blood pressure with Cardene drip in the meantime. Will discuss [facility] neurosurgery as soon as they are available. Amount and/or Complexity of Data Reviewed  Clinical lab tests: reviewed  Tests in the radiology section of CPT®: reviewed  Tests in the medicine section of CPT®: reviewed    Risk of Complications, Morbidity, and/or Mortality  General comments: Critical Care  Performed by: Devante Torres MD  Authorized by: Devante Torres MD     Critical care provider statement:     Critical care time (minutes):  40    Critical care time was exclusive of:  Separately billable procedures and treating other patients    Critical care was necessary to treat or prevent imminent or life-threatening deterioration of the following conditions: SDH    Critical care was time spent personally by me on the following activities:  Evaluation of patient's response to treatment, examination of patient, ordering and performing treatments and interventions, ordering and review of laboratory studies and re-evaluation of patient's condition      ED Course as of 10/28/22 1133   Fri Oct 28, 2022   1057 CT noted with subdural with shift.   Will manage blood pressure, for now goal systolic less than 646, will contact neurosurgery, see [facility] emergency department, range for expedited transfer [JM]   1113   ED EKG interpretation:  Rhythm: normal sinus rhythm. Rate (approx.): 90. RBBB. ST segment:  No concerning ST elevations or depressions. Nonspecific T wave abnormality this EKG was interpreted by Ibis Mancini MD,ED Provider. [JM]   1130 Discussed with neurosurgery, Dr. Seth Greene, happy to consult on the patient if he is transferred to [facility]. Would recommend contacting the patient's primary hematology/oncology team to discuss goals of care. [JM]   9431 Patient's daughter tells me that he does not have a hematologist/oncologist.  His primary care doctor has been managing his melanoma and thrombocytopenia with assistance from surgical oncology.  [JM]      ED Course User Index  [JM] Mariana Barron MD       Procedures

## 2022-10-28 NOTE — HOSPICE
[address] [facility] Help to Those in Need  [phone]     Patient Name: Michael Atkins  YOB: 1940  Age: 78 y.o. [facility] RN Note:  Hospice consult received, reviewing chart. Will follow up with Unit Nurse and Care Manager to discuss plan of care, patient status and discharge disposition within the hour. Patient is having agitation and labored breathing. Family has agreed to hospice service as GIP at [facility]. Consents are completed and they are looking for a bed. Thank you for the opportunity to be of service to this patient.    Conrado Collins RN  [phone]

## 2022-10-28 NOTE — PROGRESS NOTES
Spiritual Care Assessment/Progress Note  [facility]      NAME: Micheline Pop      MRN: [id number]  AGE: 78 y.o. SEX: male  Pentecostal Affiliation: Baptism   Language: English     10/28/2022     Total Time (in minutes): 72     Spiritual Assessment begun in [address] through conversation with:         []Patient        [x] Family    [] Friend(s)        Reason for Consult: Crisis     Spiritual beliefs: (Please include comment if needed)     [x] Identifies with a karlo tradition: Baptism      [] Supported by a karlo community:            [] Claims no spiritual orientation:           [] Seeking spiritual identity:                [] Adheres to an individual form of spirituality:           [] Not able to assess:                           Identified resources for coping:      [x] Prayer                               [] Music                  [] Guided Imagery     [x] Family/friends                 [] Pet visits     [] Devotional reading                         [] Unknown     [] Other:                                               Interventions offered during this visit: (See comments for more details)          Family/Friend(s):  Affirmation of emotions/emotional suffering, Affirmation of karlo, Catharsis/review of pertinent events in supportive environment, End of life issues discussed, Coping skills reviewed/reinforced, Normalization of emotional/spiritual concerns, Prayer (assurance of)     Plan of Care:     [] Support spiritual and/or cultural needs    [] Support AMD and/or advance care planning process      [] Support grieving process   [] Coordinate Rites and/or Rituals    [] Coordination with community clergy   [] No spiritual needs identified at this time   [] Detailed Plan of Care below (See Comments)  [] Make referral to Music Therapy  [] Make referral to Pet Therapy     [] Make referral to Addiction services  [] Make referral to [facility]  [] Make referral to Spiritual Care Partner  [] No future visits requested        [x] Follow up visits as needed     Visited with patient's family at staff request. He was sleeping for the duration of the visit. His daughter Jacklyn Ellsworth was at bedside contacting different family members. Her  picked up the patient's wife Chris Maria from her residence at [address]. The couple both live there. A second daughter is en route from  home in [place]. Jacklyn Ellsworth reports that her parents met while both were in different colleges in [place] and have been  just shy of 61 years. Jacklyn Ellsworth is aware that her father is near the end of life. The family is one of [place] karlo and they derive a sense of support and hope from their karlo.      Chaplain Wilkins MDiv, MS, [facility]

## 2022-10-28 NOTE — PROGRESS NOTES
I just spoke to the ER MD and reviewed the chart and imaging studies  77 yo man with Acute SDH , thrombocytopenia, history of recent surgery for Melanoma, and a DNR. If he gets transferred to [facility] I will see pt and consult, but explained that all of the above makes him an extremely poor candidate for any surgery and a high risk of recurrent bleeding even despite surgery.

## 2022-10-28 NOTE — ED PROVIDER NOTES
82M w/ hx CAD, HTN, thrombocytopenia transferred from [place] [address] for SDH. Pt transferred to [facility] from [facility] for a SDH w/ CT showing mass effect, midline shift and subfalcine herniation. Reportedly pt was awake and alert at OSH but pt became obtunded and somnolent w/ EMS en route. Daughters at bedside who are POA and state that he is DNR. (EMS was reportedly unaware of this and attempted endotracheal intubation but did not give any medications.) Pt's wife has advanced dementia and daughter are decision makers. Pt unable to provide any hx on arrival.       Past Medical History:   Diagnosis Date    Abnormal CT of the chest     CT 5/3/21: chronic worsning partial consolidation of the left lower lobe     Aspirin declined by patient     declines due to hx of epistaxis on asa and due to chronic thombocytopenia    BPH (benign prostatic hyperplasia)     saw urology in [place]    CAD in native artery 2004    s/p stent x2. saw Dr. Guerrero Armstrong [place]    DNR (do not resuscitate) 4/17/2018    HTN (hypertension)     Hx of diverticulitis of colon     Hypercholesterolemia     Melanoma [address]     x4. one present on L shoulder stump. 2013. superficial per pt    Phantom pain (HCC)     L arm    Polycystic kidney disease     Post-polio syndrome age 4    dx 2004 in [place]. He still drives. weakness, R deltoid weakness, s/p L arm amputation, R hip pain, R leg-ankle brace    Renal cyst     8 cm, increasing 20 years    Right hip pain     chronic, polio    Thrombocytopenia (HCC)     chronic. clumping seen 1/2021. 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 HX CORONARY STENT PLACEMENT  2006    HX CORONARY STENT PLACEMENT  2004?     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 Tobacco Use    Smoking status: Never    Smokeless tobacco: Never   Substance and Sexual Activity    Alcohol use: No    Drug use: Never    Sexual activity: Yes   Other Topics Concern    Not on file   Social History Narrative    Not on file     Social Determinants of Health     Financial Resource Strain: Not on file   Food Insecurity: Not on file   Transportation Needs: Not on file   Physical Activity: Not on file   Stress: Not on file   Social Connections: Not on file   Intimate Partner Violence: Not on file   Housing Stability: Not on file         ALLERGIES: Patient has no known allergies. Review of Systems   Unable to perform ROS: Acuity of condition     Vitals:    10/28/22 1218   BP: (!) 147/87   Pulse: (!) 117   Resp: 18   SpO2: 100%            Physical Exam  Constitutional:       General: He is in acute distress. Appearance: He is ill-appearing and toxic-appearing. Comments: Obtunded and somnolent  Unresponsive to painful and verbal stimuli   HENT:      Head: Normocephalic. Mouth/Throat:      Comments: Copious bloody secretions  Eyes:      General: No scleral icterus. Comments: Pupils equal, mid sized and sluggish   Cardiovascular:      Rate and Rhythm: Regular rhythm. Tachycardia present. Pulses: Normal pulses. Pulmonary:      Breath sounds: Rhonchi and rales present. Comments: Labored, sonorous, noisy respirations  Abdominal:      General: Abdomen is flat. There is no distension. Palpations: Abdomen is soft. Musculoskeletal:      Right lower leg: No edema. Left lower leg: No edema.    Skin:     General: Skin is warm and dry. Coloration: Skin is not jaundiced. Neurological:      Comments: Obtunded and somnolent  Unresponsive to painful and verbal stimuli  No posturing  Pupils equal, mid sized and sluggish        MDM  Number of Diagnoses or Management Options  Acute respiratory failure, unspecified whether with hypoxia or hypercapnia ([facility] [address]  Encounter for admission to hospice care  Intracranial herniation (HCC)  SDH (subdural hematoma)  Diagnosis management comments: 82M w/ hx CAD, HTN, thrombocytopenia transferred from [place] [address] for SDH. Pt transferred to [facility] from [facility] for a SDH w/ CT showing mass effect, midline shift and subfalcine herniation. Reportedly pt was awake and alert at OSH but pt became obtunded and somnolent w/ EMS en route. Daughters at bedside who are POA and state that he is DNR. On arrival, pt obtunded and somnolont w/ copious bloody secretions and sonorous respirations. Completely unresponsive to all stimuli. I spoke w/ both daughters at bedside and re-affirmed that their and pt's wishes are to focus on comfort care measures and maintain DNR/DNI. I also spoke w/ NSG (Dr Leona Merlin) who thinks pt is a poor surgical candidate and I conveyed this to both daughters as well. Pt seen by hospice team in ED and will be admitted to inpatient hospice. Ordered morphine, ativan, glycopyrrolate, IVF and keppra for sz prophylaxis.            Amount and/or Complexity of Data Reviewed  Clinical lab tests: reviewed  Tests in the radiology section of CPT®: reviewed  Tests in the medicine section of CPT®: reviewed  Discussion of test results with the performing providers: yes  Decide to obtain previous medical records or to obtain history from someone other than the patient: yes  Obtain history from someone other than the patient: yes  Review and summarize past medical records: yes  Discuss the patient with other providers: yes  Independent visualization of images, tracings, or specimens: yes    Risk of Complications, Morbidity, and/or Mortality  Presenting problems: high  Diagnostic procedures: moderate  Management options: high           Procedures

## 2022-10-28 NOTE — PROGRESS NOTES
10/28/2022; 1210 -   Per request, CM reviewed patient's chart. Per EMS Report, patient's airway was lost en route. CM confirmed that patient has a DDNR on file. Patient also has an AMD on file, indicating:    Spouse, Leroy Dotson, as mPOA1  Daughters, Miguel Angel Peng and Dipika Martin StormGabriel, as joint mPOA2    Nursing is aware that patient is a DDNR. Family present at bedside. CRM: Elver Lopez MPH, Mayra Blair [address] [phone] or [phone] -   CM present with ED Attending for discussion with patient's daughters Nikhil Pierson and Dipika Smith Moors aka \"Gregorio\"  on the phone). Daughter Nikhil Pierson identified that the patient's spouse has a hx of dementia. Attending discussed patient's current medical situation. Family is in agreement to comfort measures and DDNR status. Per request and CM Consult, CM submitted STAT Hospice Consult for GIP eval.  Referral submitted via CC. Orders placed. CM contacted Hospice Liaison Lin Pichardo [phone]) and notified of new consult for GIP eval.  Family at bedside is aware that hospice will be seeing patient shortly. CRM: Elver Lopez MPH, Mayra Blair [address] [phone] or [phone] -   Per family request, CM Paged .  to present to bedside shortly.   CRM: Elver Lopez MPH, Mayra Blair [address] [phone] or [phone]

## 2022-10-29 NOTE — PROGRESS NOTES
Notified by staff regarding the death of Mr Autumn Hawkins in room 603. Patient's wife, two daughters, son-in-law, and granddaughter were at the bedside. Family were actively grieving the death of their loved one; offered condolences. Provided spiritual presence, active listening, and emotional support as family attempted to process the death; they expressed feelings of being overwhelmed due to the suddenness. Acknowledged their feelings and provided information regarding what would take place next. Family stated they were not certain which  service they would like to handle the remains. Instructed them to call the hospital and notify Nursing Supervisor once the decision had been made. Patient's wife stated that they were [facility]. With family's permission, had prayer of commendation for the . Reassured family of continued prayers on their behalf. : Rev. Lashae Stein.  Eliana Rodas; [facility], to contact [phone] [address] call: [phone]

## 2022-10-29 NOTE — HOSPICE
[facility]  [phone]    Discharge/Death Nursing Note   Patient Name: Jennifer Rivas  YOB: 1940  Age: 78 y.o. Date of Death: 10/28/2022  Admitted Date: 10/28/2022  Time of Death: [address] [facility] of Care: [facility]  Level of Care: [facility]  Patient Room: 603/     Hospice Attending: Dr. Erin Carter Diagnosis: CVA (cerebrovascular accident due to intracerebral hemorrhage) [facility]) [I61.9]    Death Pronouncement   Pronouncement of death completed by: Jared Harvey NP    Agency staff was not present at the time of death    At the time of death the patient was documented as:   no heart sounds or breath sounds were noted after 1 minute of auscultation. Pupils were fixed and dilated without pupillary light reflex. The pt  within [facility]    The following were notified of the patient's death: MD, , nursing supvr, [employer],     Medications were disposed of per facility protocol     Discharge Summary   Discharge Reason: Death    Summary of Care Provided:    [x] Post mortem care provided by nursing staff  [x] Notification of  home by nursing supvr  [x] Referrals/Community resources provided:   [x] Goals completed  [] Durable Medical Equipment vendor notified     Disciplines involved: [x] RN [] SW [x]  [] DANIELS [] Vol [] PT [] OT [] ST [] BC     [x] IDT communication/notification    Attending Physician, Dr. Chelo Benitez, notified of death    Bereaved   Verify bereaved identified with name, address, telephone number and risk level      Advance Care Planning 2021   Confirm Advance Directive Yes, not on file     Katerine Caruso, wife ([phone]) bereavement moderate.   FH TBD

## 2022-10-29 NOTE — DEATH NOTE
Death Pronouncement Note      Events prior to patients death:  Called to bedside at 11:25 PM for unresponsive and pulseless patient. On exam, no heart sounds or breath sounds were noted after 1 minute of auscultation. Pupils were fixed and dilated without pupillary light reflex. Patient was pronounced dead on 10/28/2022  at    Date/Time of death: 10/28/2022 at 2228      Cause:  Immediate: Subdural hematoma/ intracranial hemorrhage    Underlying cause:   Hospital Problems  Date Reviewed: 6/27/2022            Codes Class Noted POA    CVA (cerebrovascular accident due to intracerebral hemorrhage) ([facility] [address] ICD-10-CM: I61.9  ICD-9-CM: 029  10/28/2022 Unknown        Restlessness and agitation ICD-10-CM: R45.1  ICD-9-CM: 799.29, 307.9  10/28/2022 Unknown        Nonverbal signs of pain ICD-10-CM: R52  ICD-9-CM: V49.89  10/28/2022 Unknown        Shortness of breath ICD-10-CM: R06.02  ICD-9-CM: 786.05  10/28/2022 Unknown        Hospice care ICD-10-CM: Z51.5  ICD-9-CM: V66.7  7/25/2017 Yes    Overview Signed 7/25/2017  6:26 PM by Shirley Hill RN     Patient states that a completed Advanced Medical Directive is at home. NN encouraged patient to bring a copy of the completed Advanced Medical Directive to the office for scanning into the medical record. Patient verbalized understanding & agreement. Physician Pronouncing Death: Neeta Bowie NP      Attending Physician of Record:   Peyton Washington MD     Events related to death:  Was code called: YES   notified: NO  Family notified: Renee Cardenas. Multiple family members at bedside.    Autopsy requested: NO  Death certificate completed: NO  Code Status Prior to Death: DNR     Discharge summary and death certificate will be completed by: Peyton Washington MD    Date of Service:  10/28/2022

## 2022-10-29 NOTE — HOSPICE
This LMSW contacted the patient's daughter, Viona Hodgkin and offered condolences. She stated they are doing ok. She was tearful on the phone. She and her sister decided on [facility] in [place] for cremation services. They asked about seeing him. LMSW talked with liaison. It was decided they could view once he arrived at the  home. Daughters were appreciative of the information and helping them through this process. LMSW encouraged them to call the main number if they had any questions or needs.

## 2022-10-29 NOTE — PROGRESS NOTES
Problem: Falls - Risk of  Goal: *Absence of Falls  Description: Document David Hall Fall Risk and appropriate interventions in the flowsheet. Outcome: Not Progressing Towards Goal  Note: Fall Risk Interventions:            Medication Interventions: Bed/chair exit alarm    Elimination Interventions: Bed/chair exit alarm    History of Falls Interventions:  Investigate reason for fall

## 2022-11-02 NOTE — DISCHARGE SUMMARY
Hospice Discharge Summary    [address] [place] [address]  [facility]        Date of Admission: 10/28/2022  Date of Discharge: 10/28/2022    Dio Medina is a 78y.o. year old who was admitted to [address] [place] [address] at [facility] with a Hospice diagnosis of CVA (cerebrovascular accident due to intracerebral hemorrhage) ([facility] [address] [I61.9]. The patient's care was focused on comfort and the patient passed away on 10/28/2022.